# Patient Record
Sex: FEMALE | Race: BLACK OR AFRICAN AMERICAN | NOT HISPANIC OR LATINO | URBAN - METROPOLITAN AREA
[De-identification: names, ages, dates, MRNs, and addresses within clinical notes are randomized per-mention and may not be internally consistent; named-entity substitution may affect disease eponyms.]

---

## 2018-06-30 ENCOUNTER — EMERGENCY (EMERGENCY)
Facility: HOSPITAL | Age: 83
LOS: 1 days | Discharge: HOME | End: 2018-06-30
Attending: EMERGENCY MEDICINE | Admitting: EMERGENCY MEDICINE
Payer: MEDICARE

## 2018-06-30 VITALS
WEIGHT: 153 LBS | HEIGHT: 65 IN | HEART RATE: 92 BPM | OXYGEN SATURATION: 99 % | DIASTOLIC BLOOD PRESSURE: 63 MMHG | SYSTOLIC BLOOD PRESSURE: 115 MMHG | TEMPERATURE: 98 F | RESPIRATION RATE: 20 BRPM

## 2018-06-30 VITALS
HEART RATE: 76 BPM | TEMPERATURE: 98 F | RESPIRATION RATE: 16 BRPM | SYSTOLIC BLOOD PRESSURE: 178 MMHG | DIASTOLIC BLOOD PRESSURE: 88 MMHG | OXYGEN SATURATION: 98 %

## 2018-06-30 DIAGNOSIS — I25.10 ATHEROSCLEROTIC HEART DISEASE OF NATIVE CORONARY ARTERY WITHOUT ANGINA PECTORIS: ICD-10-CM

## 2018-06-30 DIAGNOSIS — Z95.0 PRESENCE OF CARDIAC PACEMAKER: Chronic | ICD-10-CM

## 2018-06-30 DIAGNOSIS — I10 ESSENTIAL (PRIMARY) HYPERTENSION: ICD-10-CM

## 2018-06-30 DIAGNOSIS — Z91.048 OTHER NONMEDICINAL SUBSTANCE ALLERGY STATUS: ICD-10-CM

## 2018-06-30 DIAGNOSIS — J10.1 INFLUENZA DUE TO OTHER IDENTIFIED INFLUENZA VIRUS WITH OTHER RESPIRATORY MANIFESTATIONS: ICD-10-CM

## 2018-06-30 DIAGNOSIS — R42 DIZZINESS AND GIDDINESS: ICD-10-CM

## 2018-06-30 LAB
ALBUMIN SERPL ELPH-MCNC: 4.1 G/DL — SIGNIFICANT CHANGE UP (ref 3.3–5)
ALP SERPL-CCNC: 74 U/L — SIGNIFICANT CHANGE UP (ref 40–120)
ALT FLD-CCNC: 9 U/L — LOW (ref 10–45)
ANION GAP SERPL CALC-SCNC: 9 MMOL/L — SIGNIFICANT CHANGE UP (ref 5–17)
AST SERPL-CCNC: 16 U/L — SIGNIFICANT CHANGE UP (ref 10–40)
BASOPHILS NFR BLD AUTO: 0.4 % — SIGNIFICANT CHANGE UP (ref 0–2)
BILIRUB SERPL-MCNC: 0.2 MG/DL — SIGNIFICANT CHANGE UP (ref 0.2–1.2)
BUN SERPL-MCNC: 14 MG/DL — SIGNIFICANT CHANGE UP (ref 7–23)
CALCIUM SERPL-MCNC: 10 MG/DL — SIGNIFICANT CHANGE UP (ref 8.4–10.5)
CHLORIDE SERPL-SCNC: 102 MMOL/L — SIGNIFICANT CHANGE UP (ref 96–108)
CO2 SERPL-SCNC: 30 MMOL/L — SIGNIFICANT CHANGE UP (ref 22–31)
CREAT SERPL-MCNC: 0.96 MG/DL — SIGNIFICANT CHANGE UP (ref 0.5–1.3)
EOSINOPHIL NFR BLD AUTO: 2 % — SIGNIFICANT CHANGE UP (ref 0–6)
GLUCOSE SERPL-MCNC: 114 MG/DL — HIGH (ref 70–99)
HCT VFR BLD CALC: 34.2 % — LOW (ref 34.5–45)
HGB BLD-MCNC: 11.8 G/DL — SIGNIFICANT CHANGE UP (ref 11.5–15.5)
HPIV3 RNA SPEC QL NAA+PROBE: DETECTED
LYMPHOCYTES # BLD AUTO: 36.3 % — SIGNIFICANT CHANGE UP (ref 13–44)
MAGNESIUM SERPL-MCNC: 2.4 MG/DL — SIGNIFICANT CHANGE UP (ref 1.6–2.6)
MCHC RBC-ENTMCNC: 24.7 PG — LOW (ref 27–34)
MCHC RBC-ENTMCNC: 34.5 G/DL — SIGNIFICANT CHANGE UP (ref 32–36)
MCV RBC AUTO: 71.5 FL — LOW (ref 80–100)
MONOCYTES NFR BLD AUTO: 4.6 % — SIGNIFICANT CHANGE UP (ref 2–14)
NEUTROPHILS NFR BLD AUTO: 56.7 % — SIGNIFICANT CHANGE UP (ref 43–77)
PLATELET # BLD AUTO: 202 K/UL — SIGNIFICANT CHANGE UP (ref 150–400)
POTASSIUM SERPL-MCNC: 4.4 MMOL/L — SIGNIFICANT CHANGE UP (ref 3.5–5.3)
POTASSIUM SERPL-SCNC: 4.4 MMOL/L — SIGNIFICANT CHANGE UP (ref 3.5–5.3)
PROT SERPL-MCNC: 7.7 G/DL — SIGNIFICANT CHANGE UP (ref 6–8.3)
RAPID RVP RESULT: DETECTED
RBC # BLD: 4.78 M/UL — SIGNIFICANT CHANGE UP (ref 3.8–5.2)
RBC # FLD: 16.2 % — SIGNIFICANT CHANGE UP (ref 10.3–16.9)
SODIUM SERPL-SCNC: 141 MMOL/L — SIGNIFICANT CHANGE UP (ref 135–145)
TROPONIN T SERPL-MCNC: <0.01 NG/ML — SIGNIFICANT CHANGE UP (ref 0–0.01)
WBC # BLD: 5 K/UL — SIGNIFICANT CHANGE UP (ref 3.8–10.5)
WBC # FLD AUTO: 5 K/UL — SIGNIFICANT CHANGE UP (ref 3.8–10.5)

## 2018-06-30 PROCEDURE — 99283 EMERGENCY DEPT VISIT LOW MDM: CPT | Mod: 25

## 2018-06-30 PROCEDURE — 93010 ELECTROCARDIOGRAM REPORT: CPT

## 2018-06-30 PROCEDURE — 85025 COMPLETE CBC W/AUTO DIFF WBC: CPT

## 2018-06-30 PROCEDURE — 87486 CHLMYD PNEUM DNA AMP PROBE: CPT

## 2018-06-30 PROCEDURE — 84484 ASSAY OF TROPONIN QUANT: CPT

## 2018-06-30 PROCEDURE — 71047 X-RAY EXAM CHEST 3 VIEWS: CPT | Mod: 26

## 2018-06-30 PROCEDURE — 71047 X-RAY EXAM CHEST 3 VIEWS: CPT

## 2018-06-30 PROCEDURE — 80053 COMPREHEN METABOLIC PANEL: CPT

## 2018-06-30 PROCEDURE — 93005 ELECTROCARDIOGRAM TRACING: CPT

## 2018-06-30 PROCEDURE — 87581 M.PNEUMON DNA AMP PROBE: CPT

## 2018-06-30 PROCEDURE — 36415 COLL VENOUS BLD VENIPUNCTURE: CPT

## 2018-06-30 PROCEDURE — 87633 RESP VIRUS 12-25 TARGETS: CPT

## 2018-06-30 PROCEDURE — 87798 DETECT AGENT NOS DNA AMP: CPT

## 2018-06-30 PROCEDURE — 99285 EMERGENCY DEPT VISIT HI MDM: CPT | Mod: 25

## 2018-06-30 PROCEDURE — 83735 ASSAY OF MAGNESIUM: CPT

## 2018-06-30 RX ADMIN — Medication 100 MILLIGRAM(S): at 14:38

## 2018-06-30 NOTE — ED PROVIDER NOTE - MEDICAL DECISION MAKING DETAILS
cough for almost 2 weeks.  on levaquin day 8/10 but still lingering.  cxr without infiltrate.  labs wnl except for rvp + for parainfluenza.  plan supportive/symptomatic treatment for viral illness.  well appearing

## 2018-06-30 NOTE — ED ADULT TRIAGE NOTE - CHIEF COMPLAINT QUOTE
Patient c/o dizziness  ( lightheadedness )  and palpitations since yesterday , also with bilateral leg numbness for a while  and cough for 1 week. Denies any fever nor chills .

## 2018-06-30 NOTE — ED PROVIDER NOTE - MUSCULOSKELETAL, MLM
Spine appears normal, range of motion is not limited, no muscle or joint tenderness.  legs normal appearance.  trace edema.  pedal pulses 2+.  no redness/warmth

## 2018-06-30 NOTE — ED PROVIDER NOTE - PMH
Atherosclerosis of coronary artery  CAD (coronary artery disease)  Essential hypertension  Hypertension  Gastroesophageal reflux disease  GERD (gastroesophageal reflux disease)  Pulmonary embolism with infarction  Pulmonary embolism

## 2018-06-30 NOTE — ED PROVIDER NOTE - CONSTITUTIONAL, MLM
normal... elderly appearing, well nourished, awake, alert, oriented to person, place, time/situation and in no apparent distress.

## 2018-08-05 ENCOUNTER — EMERGENCY (EMERGENCY)
Facility: HOSPITAL | Age: 83
LOS: 1 days | Discharge: HOME | End: 2018-08-05
Attending: EMERGENCY MEDICINE | Admitting: EMERGENCY MEDICINE
Payer: MEDICARE

## 2018-08-05 VITALS
DIASTOLIC BLOOD PRESSURE: 93 MMHG | HEART RATE: 88 BPM | TEMPERATURE: 99 F | OXYGEN SATURATION: 97 % | SYSTOLIC BLOOD PRESSURE: 160 MMHG | RESPIRATION RATE: 18 BRPM | WEIGHT: 149.91 LBS

## 2018-08-05 DIAGNOSIS — Z95.0 PRESENCE OF CARDIAC PACEMAKER: Chronic | ICD-10-CM

## 2018-08-05 PROCEDURE — 99283 EMERGENCY DEPT VISIT LOW MDM: CPT

## 2018-08-05 NOTE — ED ADULT NURSE NOTE - OBJECTIVE STATEMENT
Pt states "I have rectal pain for a while now because of my hemorrhoids but tonight its worse. It keeps coming and going and when it comes it hurts all the way in my legs. I have an appointment on 8/27/18 to follow up but tonight it's bad". Denies n/v/d, bleeding. Reports constipation

## 2018-08-05 NOTE — ED ADULT TRIAGE NOTE - CHIEF COMPLAINT QUOTE
rectal pain / back pain on/off for days getting worse since last night. pt reports last bowel movement 2 night ago. no rectal bleeding noted. pt denies fever.

## 2018-08-05 NOTE — ED ADULT NURSE NOTE - PMH
Atherosclerosis of coronary artery  CAD (coronary artery disease)  Essential hypertension  Hypertension  Gastroesophageal reflux disease  GERD (gastroesophageal reflux disease)  Hemorrhoid    Pulmonary embolism with infarction  Pulmonary embolism Atherosclerosis of coronary artery  CAD (coronary artery disease)  Essential hypertension  Hypertension  Gastroesophageal reflux disease  GERD (gastroesophageal reflux disease)  Hemorrhoid    Pacemaker    Pulmonary embolism with infarction  Pulmonary embolism

## 2018-08-05 NOTE — ED ADULT NURSE NOTE - CHPI ED NUR SYMPTOMS NEG
no burning urination/no blood in stool/no chills/no fever/no nausea/no abdominal distension/no vomiting/no dysuria/no hematuria/no diarrhea

## 2018-08-05 NOTE — ED ADULT NURSE NOTE - NSIMPLEMENTINTERV_GEN_ALL_ED
Implemented All Fall Risk Interventions:  [place] to call system. Call bell, personal items and telephone within reach. Instruct patient to call for assistance. Room bathroom lighting operational. Non-slip footwear when patient is off stretcher. Physically safe environment: no spills, clutter or unnecessary equipment. Stretcher in lowest position, wheels locked, appropriate side rails in place. Provide visual cue, wrist band, yellow gown, etc. Monitor gait and stability. Monitor for mental status changes and reorient to person, place, and time. Review medications for side effects contributing to fall risk. Reinforce activity limits and safety measures with patient and family.

## 2018-08-06 PROCEDURE — 99283 EMERGENCY DEPT VISIT LOW MDM: CPT

## 2018-08-06 RX ORDER — TRAMADOL HYDROCHLORIDE 50 MG/1
0.5 TABLET ORAL
Qty: 10 | Refills: 0 | OUTPATIENT
Start: 2018-08-06 | End: 2018-08-15

## 2018-08-06 RX ORDER — ACETAMINOPHEN 500 MG
500 TABLET ORAL ONCE
Qty: 0 | Refills: 0 | Status: COMPLETED | OUTPATIENT
Start: 2018-08-06 | End: 2018-08-06

## 2018-08-06 RX ORDER — TRAMADOL HYDROCHLORIDE 50 MG/1
25 TABLET ORAL ONCE
Qty: 0 | Refills: 0 | Status: DISCONTINUED | OUTPATIENT
Start: 2018-08-06 | End: 2018-08-06

## 2018-08-06 RX ORDER — DOCUSATE SODIUM 100 MG
1 CAPSULE ORAL
Qty: 30 | Refills: 0 | OUTPATIENT
Start: 2018-08-06 | End: 2018-08-20

## 2018-08-06 RX ADMIN — TRAMADOL HYDROCHLORIDE 25 MILLIGRAM(S): 50 TABLET ORAL at 00:19

## 2018-08-06 RX ADMIN — Medication 500 MILLIGRAM(S): at 00:19

## 2018-08-06 NOTE — ED PROVIDER NOTE - MEDICAL DECISION MAKING DETAILS
rectal pain- chronic- worse over the past few days, jillian po normally no f/c no blood in urine  no hemorrhoids on exam- low dose tramadol as needed with food-- call to [facility] for colorectal FU  2-3 days rectal pain- chronic- worse over the past few days, spasmodic- jillian po normally no f/c no blood in urine  no hemorrhoids on exam- low dose tramadol as needed with food-- call to [facility] for colorectal FU  2-3 days

## 2018-08-06 NOTE — ED PROVIDER NOTE - PMH
Atherosclerosis of coronary artery  CAD (coronary artery disease)  Essential hypertension  Hypertension  Gastroesophageal reflux disease  GERD (gastroesophageal reflux disease)  Hemorrhoid    Pacemaker    Pulmonary embolism with infarction  Pulmonary embolism

## 2018-08-06 NOTE — ED PROVIDER NOTE - OBJECTIVE STATEMENT
89 F co rectal pain- hx of cad, htn, pacer, pe- co spasmodic rectal pain- comes and goes- when it comes at night it is very severe, sharp- makes her very nervous, a little sweaty- is referred to hemorrhoid specialist- has not seen, has not taken any meds for pain  when pain comes on, it is severe, and feels like it is very tight and she has to push to pass her stool- she is passing stool and gas today no abd pain  no n/v jillian po normally  no exac sx  allev- improves with suppository

## 2018-08-09 DIAGNOSIS — Z79.899 OTHER LONG TERM (CURRENT) DRUG THERAPY: ICD-10-CM

## 2018-08-09 DIAGNOSIS — K62.89 OTHER SPECIFIED DISEASES OF ANUS AND RECTUM: ICD-10-CM

## 2018-08-09 DIAGNOSIS — Z91.041 RADIOGRAPHIC DYE ALLERGY STATUS: ICD-10-CM

## 2018-08-09 DIAGNOSIS — I10 ESSENTIAL (PRIMARY) HYPERTENSION: ICD-10-CM

## 2018-08-09 DIAGNOSIS — I25.10 ATHEROSCLEROTIC HEART DISEASE OF NATIVE CORONARY ARTERY WITHOUT ANGINA PECTORIS: ICD-10-CM

## 2018-08-09 DIAGNOSIS — Z95.0 PRESENCE OF CARDIAC PACEMAKER: ICD-10-CM

## 2018-09-24 PROBLEM — Z95.0 PRESENCE OF CARDIAC PACEMAKER: Chronic | Status: ACTIVE | Noted: 2018-08-05

## 2018-09-24 PROBLEM — K64.9 UNSPECIFIED HEMORRHOIDS: Chronic | Status: ACTIVE | Noted: 2018-08-05

## 2018-09-25 ENCOUNTER — APPOINTMENT (OUTPATIENT)
Dept: OPHTHALMOLOGY | Facility: CLINIC | Age: 83
End: 2018-09-25
Payer: MEDICARE

## 2018-09-25 DIAGNOSIS — Z96.1 PRESENCE OF INTRAOCULAR LENS: ICD-10-CM

## 2018-09-25 DIAGNOSIS — H35.363 DRUSEN (DEGENERATIVE) OF MACULA, BILATERAL: ICD-10-CM

## 2018-09-25 PROCEDURE — 92014 COMPRE OPH EXAM EST PT 1/>: CPT

## 2018-10-09 ENCOUNTER — EMERGENCY (EMERGENCY)
Facility: HOSPITAL | Age: 83
LOS: 1 days | Discharge: HOME | End: 2018-10-09
Attending: EMERGENCY MEDICINE | Admitting: EMERGENCY MEDICINE
Payer: MEDICARE

## 2018-10-09 VITALS
RESPIRATION RATE: 18 BRPM | SYSTOLIC BLOOD PRESSURE: 125 MMHG | TEMPERATURE: 97 F | OXYGEN SATURATION: 100 % | DIASTOLIC BLOOD PRESSURE: 60 MMHG | HEART RATE: 86 BPM

## 2018-10-09 VITALS
TEMPERATURE: 99 F | SYSTOLIC BLOOD PRESSURE: 159 MMHG | HEART RATE: 80 BPM | WEIGHT: 149.91 LBS | RESPIRATION RATE: 16 BRPM | OXYGEN SATURATION: 99 % | DIASTOLIC BLOOD PRESSURE: 85 MMHG

## 2018-10-09 DIAGNOSIS — I25.10 ATHEROSCLEROTIC HEART DISEASE OF NATIVE CORONARY ARTERY WITHOUT ANGINA PECTORIS: ICD-10-CM

## 2018-10-09 DIAGNOSIS — R10.13 EPIGASTRIC PAIN: ICD-10-CM

## 2018-10-09 DIAGNOSIS — Z79.899 OTHER LONG TERM (CURRENT) DRUG THERAPY: ICD-10-CM

## 2018-10-09 DIAGNOSIS — Z95.0 PRESENCE OF CARDIAC PACEMAKER: Chronic | ICD-10-CM

## 2018-10-09 DIAGNOSIS — Z91.041 RADIOGRAPHIC DYE ALLERGY STATUS: ICD-10-CM

## 2018-10-09 DIAGNOSIS — R19.7 DIARRHEA, UNSPECIFIED: ICD-10-CM

## 2018-10-09 DIAGNOSIS — Z79.2 LONG TERM (CURRENT) USE OF ANTIBIOTICS: ICD-10-CM

## 2018-10-09 DIAGNOSIS — I10 ESSENTIAL (PRIMARY) HYPERTENSION: ICD-10-CM

## 2018-10-09 DIAGNOSIS — Z79.891 LONG TERM (CURRENT) USE OF OPIATE ANALGESIC: ICD-10-CM

## 2018-10-09 LAB
ALBUMIN SERPL ELPH-MCNC: 4.1 G/DL — SIGNIFICANT CHANGE UP (ref 3.3–5)
ALP SERPL-CCNC: 76 U/L — SIGNIFICANT CHANGE UP (ref 40–120)
ALT FLD-CCNC: 7 U/L — LOW (ref 10–45)
ANION GAP SERPL CALC-SCNC: 10 MMOL/L — SIGNIFICANT CHANGE UP (ref 5–17)
APPEARANCE UR: CLEAR — SIGNIFICANT CHANGE UP
APTT BLD: 30.6 SEC — SIGNIFICANT CHANGE UP (ref 27.5–37.4)
AST SERPL-CCNC: 14 U/L — SIGNIFICANT CHANGE UP (ref 10–40)
BASOPHILS NFR BLD AUTO: 0.5 % — SIGNIFICANT CHANGE UP (ref 0–2)
BILIRUB SERPL-MCNC: 0.2 MG/DL — SIGNIFICANT CHANGE UP (ref 0.2–1.2)
BILIRUB UR-MCNC: NEGATIVE — SIGNIFICANT CHANGE UP
BUN SERPL-MCNC: 9 MG/DL — SIGNIFICANT CHANGE UP (ref 7–23)
CALCIUM SERPL-MCNC: 9.9 MG/DL — SIGNIFICANT CHANGE UP (ref 8.4–10.5)
CHLORIDE SERPL-SCNC: 98 MMOL/L — SIGNIFICANT CHANGE UP (ref 96–108)
CO2 SERPL-SCNC: 31 MMOL/L — SIGNIFICANT CHANGE UP (ref 22–31)
COLOR SPEC: YELLOW — SIGNIFICANT CHANGE UP
CREAT SERPL-MCNC: 0.78 MG/DL — SIGNIFICANT CHANGE UP (ref 0.5–1.3)
DIFF PNL FLD: NEGATIVE — SIGNIFICANT CHANGE UP
EOSINOPHIL NFR BLD AUTO: 1.6 % — SIGNIFICANT CHANGE UP (ref 0–6)
GLUCOSE SERPL-MCNC: 96 MG/DL — SIGNIFICANT CHANGE UP (ref 70–99)
GLUCOSE UR QL: NEGATIVE — SIGNIFICANT CHANGE UP
HCT VFR BLD CALC: 32.7 % — LOW (ref 34.5–45)
HGB BLD-MCNC: 11.1 G/DL — LOW (ref 11.5–15.5)
INR BLD: 1.03 — SIGNIFICANT CHANGE UP (ref 0.88–1.16)
KETONES UR-MCNC: NEGATIVE — SIGNIFICANT CHANGE UP
LACTATE SERPL-SCNC: 1.9 MMOL/L — SIGNIFICANT CHANGE UP (ref 0.5–2)
LEUKOCYTE ESTERASE UR-ACNC: NEGATIVE — SIGNIFICANT CHANGE UP
LIDOCAIN IGE QN: 29 U/L — SIGNIFICANT CHANGE UP (ref 7–60)
LYMPHOCYTES # BLD AUTO: 30.9 % — SIGNIFICANT CHANGE UP (ref 13–44)
MCHC RBC-ENTMCNC: 24.4 PG — LOW (ref 27–34)
MCHC RBC-ENTMCNC: 33.9 G/DL — SIGNIFICANT CHANGE UP (ref 32–36)
MCV RBC AUTO: 72 FL — LOW (ref 80–100)
MONOCYTES NFR BLD AUTO: 7.7 % — SIGNIFICANT CHANGE UP (ref 2–14)
NEUTROPHILS NFR BLD AUTO: 59.3 % — SIGNIFICANT CHANGE UP (ref 43–77)
NITRITE UR-MCNC: NEGATIVE — SIGNIFICANT CHANGE UP
PH UR: 6.5 — SIGNIFICANT CHANGE UP (ref 5–8)
PLATELET # BLD AUTO: 173 K/UL — SIGNIFICANT CHANGE UP (ref 150–400)
POTASSIUM SERPL-MCNC: 4.2 MMOL/L — SIGNIFICANT CHANGE UP (ref 3.5–5.3)
POTASSIUM SERPL-SCNC: 4.2 MMOL/L — SIGNIFICANT CHANGE UP (ref 3.5–5.3)
PROT SERPL-MCNC: 7.6 G/DL — SIGNIFICANT CHANGE UP (ref 6–8.3)
PROT UR-MCNC: NEGATIVE MG/DL — SIGNIFICANT CHANGE UP
PROTHROM AB SERPL-ACNC: 11.4 SEC — SIGNIFICANT CHANGE UP (ref 9.8–12.7)
RBC # BLD: 4.54 M/UL — SIGNIFICANT CHANGE UP (ref 3.8–5.2)
RBC # FLD: 16.6 % — SIGNIFICANT CHANGE UP (ref 10.3–16.9)
SODIUM SERPL-SCNC: 139 MMOL/L — SIGNIFICANT CHANGE UP (ref 135–145)
SP GR SPEC: <=1.005 — SIGNIFICANT CHANGE UP (ref 1–1.03)
TROPONIN T SERPL-MCNC: <0.01 NG/ML — SIGNIFICANT CHANGE UP (ref 0–0.01)
UROBILINOGEN FLD QL: 0.2 E.U./DL — SIGNIFICANT CHANGE UP
WBC # BLD: 4.4 K/UL — SIGNIFICANT CHANGE UP (ref 3.8–10.5)
WBC # FLD AUTO: 4.4 K/UL — SIGNIFICANT CHANGE UP (ref 3.8–10.5)

## 2018-10-09 PROCEDURE — 96375 TX/PRO/DX INJ NEW DRUG ADDON: CPT | Mod: XU

## 2018-10-09 PROCEDURE — 96365 THER/PROPH/DIAG IV INF INIT: CPT | Mod: XU

## 2018-10-09 PROCEDURE — 71275 CT ANGIOGRAPHY CHEST: CPT

## 2018-10-09 PROCEDURE — 74174 CTA ABD&PLVS W/CONTRAST: CPT

## 2018-10-09 PROCEDURE — 71045 X-RAY EXAM CHEST 1 VIEW: CPT

## 2018-10-09 PROCEDURE — 93010 ELECTROCARDIOGRAM REPORT: CPT

## 2018-10-09 PROCEDURE — 81003 URINALYSIS AUTO W/O SCOPE: CPT

## 2018-10-09 PROCEDURE — 80053 COMPREHEN METABOLIC PANEL: CPT

## 2018-10-09 PROCEDURE — 83690 ASSAY OF LIPASE: CPT

## 2018-10-09 PROCEDURE — 83605 ASSAY OF LACTIC ACID: CPT

## 2018-10-09 PROCEDURE — 71275 CT ANGIOGRAPHY CHEST: CPT | Mod: 26

## 2018-10-09 PROCEDURE — 84484 ASSAY OF TROPONIN QUANT: CPT

## 2018-10-09 PROCEDURE — 96376 TX/PRO/DX INJ SAME DRUG ADON: CPT | Mod: XU

## 2018-10-09 PROCEDURE — 99285 EMERGENCY DEPT VISIT HI MDM: CPT | Mod: 25

## 2018-10-09 PROCEDURE — 82962 GLUCOSE BLOOD TEST: CPT

## 2018-10-09 PROCEDURE — 96361 HYDRATE IV INFUSION ADD-ON: CPT

## 2018-10-09 PROCEDURE — 99284 EMERGENCY DEPT VISIT MOD MDM: CPT | Mod: 25

## 2018-10-09 PROCEDURE — 93005 ELECTROCARDIOGRAM TRACING: CPT

## 2018-10-09 PROCEDURE — 85610 PROTHROMBIN TIME: CPT

## 2018-10-09 PROCEDURE — 74174 CTA ABD&PLVS W/CONTRAST: CPT | Mod: 26

## 2018-10-09 PROCEDURE — 96372 THER/PROPH/DIAG INJ SC/IM: CPT | Mod: XU

## 2018-10-09 PROCEDURE — 85730 THROMBOPLASTIN TIME PARTIAL: CPT

## 2018-10-09 PROCEDURE — 71045 X-RAY EXAM CHEST 1 VIEW: CPT | Mod: 26

## 2018-10-09 PROCEDURE — 85025 COMPLETE CBC W/AUTO DIFF WBC: CPT

## 2018-10-09 RX ORDER — LIDOCAINE 4 G/100G
10 CREAM TOPICAL ONCE
Qty: 0 | Refills: 0 | Status: COMPLETED | OUTPATIENT
Start: 2018-10-09 | End: 2018-10-09

## 2018-10-09 RX ORDER — FAMOTIDINE 10 MG/ML
20 INJECTION INTRAVENOUS ONCE
Qty: 0 | Refills: 0 | Status: COMPLETED | OUTPATIENT
Start: 2018-10-09 | End: 2018-10-09

## 2018-10-09 RX ORDER — ONDANSETRON 8 MG/1
4 TABLET, FILM COATED ORAL ONCE
Qty: 0 | Refills: 0 | Status: COMPLETED | OUTPATIENT
Start: 2018-10-09 | End: 2018-10-09

## 2018-10-09 RX ORDER — ACETAMINOPHEN 500 MG
1000 TABLET ORAL ONCE
Qty: 0 | Refills: 0 | Status: COMPLETED | OUTPATIENT
Start: 2018-10-09 | End: 2018-10-09

## 2018-10-09 RX ORDER — DIPHENHYDRAMINE HCL 50 MG
50 CAPSULE ORAL ONCE
Qty: 0 | Refills: 0 | Status: COMPLETED | OUTPATIENT
Start: 2018-10-09 | End: 2018-10-09

## 2018-10-09 RX ORDER — IOHEXOL 300 MG/ML
30 INJECTION, SOLUTION INTRAVENOUS ONCE
Qty: 0 | Refills: 0 | Status: DISCONTINUED | OUTPATIENT
Start: 2018-10-09 | End: 2018-10-09

## 2018-10-09 RX ORDER — SODIUM CHLORIDE 9 MG/ML
500 INJECTION INTRAMUSCULAR; INTRAVENOUS; SUBCUTANEOUS ONCE
Qty: 0 | Refills: 0 | Status: COMPLETED | OUTPATIENT
Start: 2018-10-09 | End: 2018-10-09

## 2018-10-09 RX ORDER — FAMOTIDINE 10 MG/ML
1 INJECTION INTRAVENOUS
Qty: 30 | Refills: 0
Start: 2018-10-09 | End: 2018-10-23

## 2018-10-09 RX ORDER — EPINEPHRINE 0.3 MG/.3ML
0.3 INJECTION INTRAMUSCULAR; SUBCUTANEOUS ONCE
Qty: 0 | Refills: 0 | Status: COMPLETED | OUTPATIENT
Start: 2018-10-09 | End: 2018-10-09

## 2018-10-09 RX ADMIN — FAMOTIDINE 20 MILLIGRAM(S): 10 INJECTION INTRAVENOUS at 09:11

## 2018-10-09 RX ADMIN — Medication 1000 MILLIGRAM(S): at 12:44

## 2018-10-09 RX ADMIN — ONDANSETRON 4 MILLIGRAM(S): 8 TABLET, FILM COATED ORAL at 14:29

## 2018-10-09 RX ADMIN — Medication 50 MILLIGRAM(S): at 12:14

## 2018-10-09 RX ADMIN — LIDOCAINE 10 MILLILITER(S): 4 CREAM TOPICAL at 15:43

## 2018-10-09 RX ADMIN — Medication 1000 MILLIGRAM(S): at 13:13

## 2018-10-09 RX ADMIN — Medication 30 MILLILITER(S): at 15:43

## 2018-10-09 RX ADMIN — SODIUM CHLORIDE 500 MILLILITER(S): 9 INJECTION INTRAMUSCULAR; INTRAVENOUS; SUBCUTANEOUS at 07:55

## 2018-10-09 RX ADMIN — FAMOTIDINE 20 MILLIGRAM(S): 10 INJECTION INTRAVENOUS at 14:00

## 2018-10-09 RX ADMIN — Medication 400 MILLIGRAM(S): at 12:14

## 2018-10-09 RX ADMIN — SODIUM CHLORIDE 500 MILLILITER(S): 9 INJECTION INTRAMUSCULAR; INTRAVENOUS; SUBCUTANEOUS at 09:11

## 2018-10-09 RX ADMIN — Medication 40 MILLIGRAM(S): at 13:15

## 2018-10-09 RX ADMIN — Medication 40 MILLIGRAM(S): at 09:11

## 2018-10-09 RX ADMIN — EPINEPHRINE 0.3 MILLIGRAM(S): 0.3 INJECTION INTRAMUSCULAR; SUBCUTANEOUS at 14:00

## 2018-10-09 NOTE — ED PROVIDER NOTE - PROGRESS NOTE DETAILS
shortly after CT, pt had episode of decreased responsiveness, BP dropped to 100/60, neuro exam was nonfocal.  It was unlikely anaphylaxis given lack of concomitant allergic sx, although pt given epinephrine.  Pt observed for improvement and noted to regain full level of consciousness, is now alert and oriented x 3, feeling much better and tolerating PO.  ?vasovagal event following CT.  CT result is negative for acute findings, pt stable for d/c to f/u with her pmd.

## 2018-10-09 NOTE — ED PROVIDER NOTE - OBJECTIVE STATEMENT
89 y/o f hx HTN, GERD presents c/o 3 days of epigastric pain, with intermittent lower abd pain.  Pt stating she also has been having loose stools.  Pt concerned reporting pain not exactly like her GERD.  Denies fever, dysuria, CP, SOB, n/v, all other ROS negative.

## 2018-10-09 NOTE — ED ADULT NURSE REASSESSMENT NOTE - NS ED NURSE REASSESS COMMENT FT1
documentation done after pt care. pt c/o feeling nauseous and "not well" after ct scan, pt responsive to painful stimuli only, opening eyes and moaning, MD Champion and PAWAN Nunez made aware, pt taken to resus room, Vs repeated and kept on cardiac monitor, Medications given as per ordered. Pt awaken and answering questions after, epi, and pedcid, 1 NS L fluids given. Will continue to monitor.

## 2018-10-09 NOTE — ED ADULT TRIAGE NOTE - CHIEF COMPLAINT QUOTE
pt complaining of epigastric pain and lower abd pain x 2 weeks denies N/V fever chills admits to diarrhea x 3 days

## 2018-10-09 NOTE — ED ADULT NURSE NOTE - OBJECTIVE STATEMENT
Patient to the E with complaint of generalized epigastric pain associated with diarrhea, for the last few days patient stated that she is unable to sleep and leeks diarrhea all the time even when she does not want to associated symptoms to include burning and pain on urination , denies fever and chills.

## 2018-10-09 NOTE — ED PROVIDER NOTE - ATTENDING CONTRIBUTION TO CARE
89 yo F hx of GERD and HTN presenting with 2-3 days of burning epigastric pain sometimes in lower abd and back.  Similar but worse than typical gerd.  + associated loose BMs w/o blood and chills but no fever.  No chest pain, n/v, weakness, numbness, tingling.  Pts looks well, belly soft, nl pulses UE and LE, 2/2.  Nl cardio pulm exam.  VS - HTN, otherwise stable.  Plan EKG, CXR, labs, CTa (known severe allergy to contrast) - will need pretx given potential for dissection or vascular occlusive dz.  Most likely however this is GERD/gastritis.

## 2018-10-09 NOTE — ED PROVIDER NOTE - MEDICAL DECISION MAKING DETAILS
89 y/o f hx HTN, GERD presents c/o epigastric pain, intermittent lower abd pain x 3 days with diarrhea; no tenderness on exam in ED, labs wnl, urine normal, vss.  Will CT chest/abd/pelvis to r/o dissection given atypical pain, possible GERD etiology given pepcid.  Will f/u imaging and reassess.  (pt with severe IV contrast allergy, pre-medicated in order to obtain appropriate study).

## 2018-10-09 NOTE — ED ADULT NURSE NOTE - FAMILY HISTORY
No pertinent family history, No significant family history     Problems influencing health status, No family history of coronary artery disease

## 2018-10-11 ENCOUNTER — EMERGENCY (EMERGENCY)
Facility: HOSPITAL | Age: 83
LOS: 1 days | Discharge: HOME | End: 2018-10-11
Attending: EMERGENCY MEDICINE | Admitting: EMERGENCY MEDICINE
Payer: MEDICARE

## 2018-10-11 VITALS
TEMPERATURE: 98 F | HEART RATE: 70 BPM | SYSTOLIC BLOOD PRESSURE: 174 MMHG | RESPIRATION RATE: 20 BRPM | OXYGEN SATURATION: 98 % | DIASTOLIC BLOOD PRESSURE: 79 MMHG

## 2018-10-11 VITALS
TEMPERATURE: 98 F | HEART RATE: 78 BPM | WEIGHT: 149.91 LBS | RESPIRATION RATE: 20 BRPM | OXYGEN SATURATION: 98 % | SYSTOLIC BLOOD PRESSURE: 167 MMHG | DIASTOLIC BLOOD PRESSURE: 85 MMHG

## 2018-10-11 DIAGNOSIS — Z91.048 OTHER NONMEDICINAL SUBSTANCE ALLERGY STATUS: ICD-10-CM

## 2018-10-11 DIAGNOSIS — I25.10 ATHEROSCLEROTIC HEART DISEASE OF NATIVE CORONARY ARTERY WITHOUT ANGINA PECTORIS: ICD-10-CM

## 2018-10-11 DIAGNOSIS — R42 DIZZINESS AND GIDDINESS: ICD-10-CM

## 2018-10-11 DIAGNOSIS — Z95.0 PRESENCE OF CARDIAC PACEMAKER: Chronic | ICD-10-CM

## 2018-10-11 DIAGNOSIS — Z79.899 OTHER LONG TERM (CURRENT) DRUG THERAPY: ICD-10-CM

## 2018-10-11 DIAGNOSIS — I10 ESSENTIAL (PRIMARY) HYPERTENSION: ICD-10-CM

## 2018-10-11 LAB
ALBUMIN SERPL ELPH-MCNC: 4.4 G/DL — SIGNIFICANT CHANGE UP (ref 3.3–5)
ALP SERPL-CCNC: 73 U/L — SIGNIFICANT CHANGE UP (ref 40–120)
ALT FLD-CCNC: 10 U/L — SIGNIFICANT CHANGE UP (ref 10–45)
ANION GAP SERPL CALC-SCNC: 12 MMOL/L — SIGNIFICANT CHANGE UP (ref 5–17)
APPEARANCE UR: CLEAR — SIGNIFICANT CHANGE UP
APTT BLD: 26.7 SEC — LOW (ref 27.5–37.4)
AST SERPL-CCNC: 20 U/L — SIGNIFICANT CHANGE UP (ref 10–40)
BASOPHILS NFR BLD AUTO: 0.4 % — SIGNIFICANT CHANGE UP (ref 0–2)
BILIRUB SERPL-MCNC: 0.2 MG/DL — SIGNIFICANT CHANGE UP (ref 0.2–1.2)
BILIRUB UR-MCNC: NEGATIVE — SIGNIFICANT CHANGE UP
BUN SERPL-MCNC: 12 MG/DL — SIGNIFICANT CHANGE UP (ref 7–23)
CALCIUM SERPL-MCNC: 10.5 MG/DL — SIGNIFICANT CHANGE UP (ref 8.4–10.5)
CHLORIDE SERPL-SCNC: 98 MMOL/L — SIGNIFICANT CHANGE UP (ref 96–108)
CK MB CFR SERPL CALC: 1.5 NG/ML — SIGNIFICANT CHANGE UP (ref 0–6.7)
CO2 SERPL-SCNC: 29 MMOL/L — SIGNIFICANT CHANGE UP (ref 22–31)
COLOR SPEC: YELLOW — SIGNIFICANT CHANGE UP
CREAT SERPL-MCNC: 0.82 MG/DL — SIGNIFICANT CHANGE UP (ref 0.5–1.3)
DIFF PNL FLD: NEGATIVE — SIGNIFICANT CHANGE UP
EOSINOPHIL NFR BLD AUTO: 0.5 % — SIGNIFICANT CHANGE UP (ref 0–6)
GLUCOSE SERPL-MCNC: 103 MG/DL — HIGH (ref 70–99)
GLUCOSE UR QL: NEGATIVE — SIGNIFICANT CHANGE UP
HCT VFR BLD CALC: 32.8 % — LOW (ref 34.5–45)
HGB BLD-MCNC: 11.5 G/DL — SIGNIFICANT CHANGE UP (ref 11.5–15.5)
INR BLD: 1.04 — SIGNIFICANT CHANGE UP (ref 0.88–1.16)
KETONES UR-MCNC: NEGATIVE — SIGNIFICANT CHANGE UP
LEUKOCYTE ESTERASE UR-ACNC: ABNORMAL
LYMPHOCYTES # BLD AUTO: 41.4 % — SIGNIFICANT CHANGE UP (ref 13–44)
MCHC RBC-ENTMCNC: 24.9 PG — LOW (ref 27–34)
MCHC RBC-ENTMCNC: 35.1 G/DL — SIGNIFICANT CHANGE UP (ref 32–36)
MCV RBC AUTO: 71 FL — LOW (ref 80–100)
MONOCYTES NFR BLD AUTO: 5.3 % — SIGNIFICANT CHANGE UP (ref 2–14)
NEUTROPHILS NFR BLD AUTO: 52.4 % — SIGNIFICANT CHANGE UP (ref 43–77)
NITRITE UR-MCNC: NEGATIVE — SIGNIFICANT CHANGE UP
PH UR: 7.5 — SIGNIFICANT CHANGE UP (ref 5–8)
PLATELET # BLD AUTO: 184 K/UL — SIGNIFICANT CHANGE UP (ref 150–400)
POTASSIUM SERPL-MCNC: 4.1 MMOL/L — SIGNIFICANT CHANGE UP (ref 3.5–5.3)
POTASSIUM SERPL-SCNC: 4.1 MMOL/L — SIGNIFICANT CHANGE UP (ref 3.5–5.3)
PROT SERPL-MCNC: 8 G/DL — SIGNIFICANT CHANGE UP (ref 6–8.3)
PROT UR-MCNC: NEGATIVE MG/DL — SIGNIFICANT CHANGE UP
PROTHROM AB SERPL-ACNC: 11.5 SEC — SIGNIFICANT CHANGE UP (ref 9.8–12.7)
RBC # BLD: 4.62 M/UL — SIGNIFICANT CHANGE UP (ref 3.8–5.2)
RBC # FLD: 16.5 % — SIGNIFICANT CHANGE UP (ref 10.3–16.9)
SODIUM SERPL-SCNC: 139 MMOL/L — SIGNIFICANT CHANGE UP (ref 135–145)
SP GR SPEC: 1.01 — SIGNIFICANT CHANGE UP (ref 1–1.03)
TROPONIN T SERPL-MCNC: <0.01 NG/ML — SIGNIFICANT CHANGE UP (ref 0–0.01)
UROBILINOGEN FLD QL: 0.2 E.U./DL — SIGNIFICANT CHANGE UP
WBC # BLD: 5.6 K/UL — SIGNIFICANT CHANGE UP (ref 3.8–10.5)
WBC # FLD AUTO: 5.6 K/UL — SIGNIFICANT CHANGE UP (ref 3.8–10.5)

## 2018-10-11 PROCEDURE — 99284 EMERGENCY DEPT VISIT MOD MDM: CPT | Mod: 25

## 2018-10-11 PROCEDURE — 85730 THROMBOPLASTIN TIME PARTIAL: CPT

## 2018-10-11 PROCEDURE — 99284 EMERGENCY DEPT VISIT MOD MDM: CPT

## 2018-10-11 PROCEDURE — 80053 COMPREHEN METABOLIC PANEL: CPT

## 2018-10-11 PROCEDURE — 85025 COMPLETE CBC W/AUTO DIFF WBC: CPT

## 2018-10-11 PROCEDURE — 84484 ASSAY OF TROPONIN QUANT: CPT

## 2018-10-11 PROCEDURE — 70450 CT HEAD/BRAIN W/O DYE: CPT | Mod: 26

## 2018-10-11 PROCEDURE — 70450 CT HEAD/BRAIN W/O DYE: CPT

## 2018-10-11 PROCEDURE — 85610 PROTHROMBIN TIME: CPT

## 2018-10-11 PROCEDURE — 82962 GLUCOSE BLOOD TEST: CPT

## 2018-10-11 PROCEDURE — 36415 COLL VENOUS BLD VENIPUNCTURE: CPT

## 2018-10-11 PROCEDURE — 82553 CREATINE MB FRACTION: CPT

## 2018-10-11 PROCEDURE — 81001 URINALYSIS AUTO W/SCOPE: CPT

## 2018-10-11 PROCEDURE — 82550 ASSAY OF CK (CPK): CPT

## 2018-10-11 RX ORDER — SODIUM CHLORIDE 9 MG/ML
1000 INJECTION INTRAMUSCULAR; INTRAVENOUS; SUBCUTANEOUS ONCE
Qty: 0 | Refills: 0 | Status: COMPLETED | OUTPATIENT
Start: 2018-10-11 | End: 2018-10-11

## 2018-10-11 RX ORDER — ACETAMINOPHEN 500 MG
650 TABLET ORAL ONCE
Qty: 0 | Refills: 0 | Status: COMPLETED | OUTPATIENT
Start: 2018-10-11 | End: 2018-10-11

## 2018-10-11 RX ADMIN — SODIUM CHLORIDE 1000 MILLILITER(S): 9 INJECTION INTRAMUSCULAR; INTRAVENOUS; SUBCUTANEOUS at 19:38

## 2018-10-11 RX ADMIN — Medication 650 MILLIGRAM(S): at 18:45

## 2018-10-11 RX ADMIN — Medication 650 MILLIGRAM(S): at 17:37

## 2018-10-11 NOTE — ED PROVIDER NOTE - MEDICAL DECISION MAKING DETAILS
89 yo F with pmh of HTN, GERD, PPM c/o lightheadedness x 2 days since receiving IV contrast.  Associated with HA and generalized weakness. No cp, sob, n/v. VSS. Well appearing. Neurologically intact. EKG NSR @ 71, TWI III, AVf, V4-V6. check labs, CT, hydrate

## 2018-10-11 NOTE — ED ADULT NURSE NOTE - NSIMPLEMENTINTERV_GEN_ALL_ED
Implemented All Fall with Harm Risk Interventions:  [place] to call system. Call bell, personal items and telephone within reach. Instruct patient to call for assistance. Room bathroom lighting operational. Non-slip footwear when patient is off stretcher. Physically safe environment: no spills, clutter or unnecessary equipment. Stretcher in lowest position, wheels locked, appropriate side rails in place. Provide visual cue, wrist band, yellow gown, etc. Monitor gait and stability. Monitor for mental status changes and reorient to person, place, and time. Review medications for side effects contributing to fall risk. Reinforce activity limits and safety measures with patient and family. Provide visual clues: red socks.

## 2018-10-11 NOTE — ED PROVIDER NOTE - OBJECTIVE STATEMENT
89 yo F with pmh of HTN, GERD, PPM c/o lightheadedness x 2 days since receiving IV contrast. Pt states she was here 2 days ago and was given IV cotnrast for a CTA C/A/P. Pt states she is allergic to contrast and had to be pre-medicated but she still had an unresponsive after receiving the contrast. Pt states since then she has been feeling lightheaded and weak. Pt also c/o chronic pain in the top of her head an forehead for over 1 year but has been worse over the past 2 days. Pt states she has a feeling of "swelling" in her head. Pt has been evaluated by a neurologist and had negative CT scans. Pt has tried different treatments without relief. Pt reports the feeling in her head is also worse over the past 2 days. Denies fever, chills, n/v, 89 yo F with pmh of HTN, GERD, PPM c/o lightheadedness x 2 days since receiving IV contrast. Pt states she was here 2 days ago and was given IV contrast for a CTA C/A/P. Pt states she is allergic to contrast and had to be pre-medicated but she still had an unresponsive after receiving the contrast. Pt states since then she has been feeling lightheaded and weak. Pt also c/o chronic pain in the top of her head an forehead for over 1 year but has been worse over the past 2 days. Pt states she has a feeling of "swelling" in her head. Pt has been evaluated by a neurologist and had negative CT scans. Pt has tried different treatments without relief. Pt reports the feeling in her head is also worse over the past 2 days. Denies fever, chills, n/v, visual changes, numbness, tingling, focal weakness, CP, sob, palpitations, abd pain.

## 2018-10-11 NOTE — ED PROVIDER NOTE - ATTENDING CONTRIBUTION TO CARE
Attending Statement: I have personally performed a face to face diagnostic evaluation on this patient. I have reviewed the ACP note and agree with the history, exam and plan of care, except as noted.     Attending Contribution to Care:  89F with pmh of HTN, GERD, PPM c/o lightheadedness x 2 days since receiving IV contrast.  Associated with HA and generalized weakness. No cp, sob, n/v. VSS. Well appearing. No focal neuro deficits. EKG no stemi. Labs and CT with no emergent findings. Pt was hydrated. She was very upset about being given IV contrast the other day and I assured her chart reflects that she is allergic and that she will not get it again. She felt better after a period of observation. She was advised to recheck her BP and continue her meds as her BP was slightly high today. She verbalized understanding. The patient is stable for DC. They were advised to call their PMD for prompt outpatient follow up. Return precautions were discussed. The patient was advised to return to the ER for any concerning or worsening symptoms.

## 2018-10-11 NOTE — ED PROVIDER NOTE - PHYSICAL EXAMINATION
CONSTITUTIONAL: Well-appearing; well-nourished; in no apparent distress.   HEAD: Normocephalic; atraumatic.   EYES: PERRL; EOM intact; conjunctiva and sclera clear  ENT: normal nose; no rhinorrhea; normal pharynx with no erythema or lesions.   NECK: Supple; non-tender; no LAD  CARDIOVASCULAR: Normal S1, S2; no murmurs, rubs, or gallops. Regular rate and rhythm.   RESPIRATORY: Breathing easily; breath sounds clear and equal bilaterally; no wheezes, rhonchi, or rales.  GI: Soft; non-distended; non-tender; no palpable organomegaly.   MSK: FROM at all extremities, normal tone   EXT: No cyanosis or edema; N/V intact  SKIN: Normal for age and race; warm; dry; good turgor; no apparent lesions or rash.   NEURO: A & O x 3; face symmetric; grossly unremarkable. CN 2-12 intact, normal finger to nose, normal gait, no pronator drift   PSYCHOLOGICAL: The patient’s mood and manner are appropriate.

## 2018-10-11 NOTE — ED ADULT NURSE NOTE - OBJECTIVE STATEMENT
pt reports dizziness for the past 2 days with walking.  States the dizziness occurs with walking.   Denies pain.  No extremities weakness.  Face symmetrical

## 2018-10-11 NOTE — ED ADULT TRIAGE NOTE - CHIEF COMPLAINT QUOTE
pt c/o dizziness since 2 days ago. pt states " I was here Tuesday and they gave me that contrast and since then I feel dizzy"  FS and ekg in progress.  no facial droop noted.

## 2018-10-11 NOTE — ED ADULT NURSE NOTE - CADM POA PRESS ULCER
FYI: Patient has refused stress testing as recommended by his cardiologist and therefore I cannot comment on perioperative risk.
No

## 2019-01-04 NOTE — ED PROVIDER NOTE - OBJECTIVE STATEMENT
RN phoned patient with results.  Left message for pt to return call to [facility]    Encounter open for patient call back   here with persistent cough for a few weeks.  Started while traveling out of state.  Went to urgent care and was prescribed levaquin 500mg x 10 days.  Has taken 8 days.  States cough less productive but persistent.  Associated with intermittent lightheadedness and numbness in legs.  No fever, chest pain, n/v/d.  Reports seeing pmd on Monday and being told she didn't have pneumonia but that she should finish the Levaquin prescription.

## 2019-01-15 ENCOUNTER — APPOINTMENT (OUTPATIENT)
Dept: OPHTHALMOLOGY | Facility: CLINIC | Age: 84
End: 2019-01-15
Payer: MEDICARE

## 2019-01-15 PROCEDURE — 92012 INTRM OPH EXAM EST PATIENT: CPT

## 2019-01-15 PROCEDURE — 92134 CPTRZ OPH DX IMG PST SGM RTA: CPT

## 2019-02-14 ENCOUNTER — EMERGENCY (EMERGENCY)
Facility: HOSPITAL | Age: 84
LOS: 1 days | Discharge: HOME | End: 2019-02-14
Attending: EMERGENCY MEDICINE | Admitting: EMERGENCY MEDICINE
Payer: MEDICARE

## 2019-02-14 VITALS
OXYGEN SATURATION: 98 % | SYSTOLIC BLOOD PRESSURE: 153 MMHG | WEIGHT: 147.93 LBS | RESPIRATION RATE: 16 BRPM | HEIGHT: 65 IN | DIASTOLIC BLOOD PRESSURE: 78 MMHG | HEART RATE: 72 BPM | TEMPERATURE: 98 F

## 2019-02-14 VITALS
TEMPERATURE: 99 F | SYSTOLIC BLOOD PRESSURE: 138 MMHG | RESPIRATION RATE: 18 BRPM | HEART RATE: 71 BPM | OXYGEN SATURATION: 98 % | DIASTOLIC BLOOD PRESSURE: 65 MMHG

## 2019-02-14 DIAGNOSIS — K62.89 OTHER SPECIFIED DISEASES OF ANUS AND RECTUM: ICD-10-CM

## 2019-02-14 DIAGNOSIS — Z95.0 PRESENCE OF CARDIAC PACEMAKER: Chronic | ICD-10-CM

## 2019-02-14 DIAGNOSIS — I25.10 ATHEROSCLEROTIC HEART DISEASE OF NATIVE CORONARY ARTERY WITHOUT ANGINA PECTORIS: ICD-10-CM

## 2019-02-14 DIAGNOSIS — I10 ESSENTIAL (PRIMARY) HYPERTENSION: ICD-10-CM

## 2019-02-14 DIAGNOSIS — Z79.899 OTHER LONG TERM (CURRENT) DRUG THERAPY: ICD-10-CM

## 2019-02-14 DIAGNOSIS — Z91.041 RADIOGRAPHIC DYE ALLERGY STATUS: ICD-10-CM

## 2019-02-14 DIAGNOSIS — G89.29 OTHER CHRONIC PAIN: ICD-10-CM

## 2019-02-14 DIAGNOSIS — E87.1 HYPO-OSMOLALITY AND HYPONATREMIA: ICD-10-CM

## 2019-02-14 LAB
ALBUMIN SERPL ELPH-MCNC: 4.4 G/DL — SIGNIFICANT CHANGE UP (ref 3.3–5)
ALP SERPL-CCNC: 89 U/L — SIGNIFICANT CHANGE UP (ref 40–120)
ALT FLD-CCNC: 12 U/L — SIGNIFICANT CHANGE UP (ref 10–45)
ANION GAP SERPL CALC-SCNC: 10 MMOL/L — SIGNIFICANT CHANGE UP (ref 5–17)
ANISOCYTOSIS BLD QL: SLIGHT — SIGNIFICANT CHANGE UP
APPEARANCE UR: CLEAR — SIGNIFICANT CHANGE UP
APTT BLD: 28 SEC — SIGNIFICANT CHANGE UP (ref 27.5–36.3)
AST SERPL-CCNC: 21 U/L — SIGNIFICANT CHANGE UP (ref 10–40)
BASOPHILS # BLD AUTO: 0.04 K/UL — SIGNIFICANT CHANGE UP (ref 0–0.2)
BASOPHILS NFR BLD AUTO: 0.7 % — SIGNIFICANT CHANGE UP (ref 0–2)
BILIRUB SERPL-MCNC: 0.3 MG/DL — SIGNIFICANT CHANGE UP (ref 0.2–1.2)
BILIRUB UR-MCNC: NEGATIVE — SIGNIFICANT CHANGE UP
BUN SERPL-MCNC: 10 MG/DL — SIGNIFICANT CHANGE UP (ref 7–23)
CALCIUM SERPL-MCNC: 10.2 MG/DL — SIGNIFICANT CHANGE UP (ref 8.4–10.5)
CHLORIDE SERPL-SCNC: 86 MMOL/L — LOW (ref 96–108)
CO2 SERPL-SCNC: 32 MMOL/L — HIGH (ref 22–31)
COLOR SPEC: YELLOW — SIGNIFICANT CHANGE UP
CREAT SERPL-MCNC: 0.71 MG/DL — SIGNIFICANT CHANGE UP (ref 0.5–1.3)
DIFF PNL FLD: NEGATIVE — SIGNIFICANT CHANGE UP
EOSINOPHIL # BLD AUTO: 0.1 K/UL — SIGNIFICANT CHANGE UP (ref 0–0.5)
EOSINOPHIL NFR BLD AUTO: 1.7 % — SIGNIFICANT CHANGE UP (ref 0–6)
GLUCOSE SERPL-MCNC: 111 MG/DL — HIGH (ref 70–99)
GLUCOSE UR QL: NEGATIVE — SIGNIFICANT CHANGE UP
HCT VFR BLD CALC: 34.8 % — SIGNIFICANT CHANGE UP (ref 34.5–45)
HGB BLD-MCNC: 12.3 G/DL — SIGNIFICANT CHANGE UP (ref 11.5–15.5)
IMM GRANULOCYTES NFR BLD AUTO: 0.2 % — SIGNIFICANT CHANGE UP (ref 0–1.5)
INR BLD: 0.97 — SIGNIFICANT CHANGE UP (ref 0.88–1.16)
KETONES UR-MCNC: NEGATIVE — SIGNIFICANT CHANGE UP
LEUKOCYTE ESTERASE UR-ACNC: ABNORMAL
LYMPHOCYTES # BLD AUTO: 2.48 K/UL — SIGNIFICANT CHANGE UP (ref 1–3.3)
LYMPHOCYTES # BLD AUTO: 41.5 % — SIGNIFICANT CHANGE UP (ref 13–44)
MANUAL SMEAR VERIFICATION: SIGNIFICANT CHANGE UP
MCHC RBC-ENTMCNC: 25.7 PG — LOW (ref 27–34)
MCHC RBC-ENTMCNC: 35.3 GM/DL — SIGNIFICANT CHANGE UP (ref 32–36)
MCV RBC AUTO: 72.7 FL — LOW (ref 80–100)
MICROCYTES BLD QL: SLIGHT — SIGNIFICANT CHANGE UP
MONOCYTES # BLD AUTO: 0.51 K/UL — SIGNIFICANT CHANGE UP (ref 0–0.9)
MONOCYTES NFR BLD AUTO: 8.5 % — SIGNIFICANT CHANGE UP (ref 2–14)
NEUTROPHILS # BLD AUTO: 2.84 K/UL — SIGNIFICANT CHANGE UP (ref 1.8–7.4)
NEUTROPHILS NFR BLD AUTO: 47.4 % — SIGNIFICANT CHANGE UP (ref 43–77)
NITRITE UR-MCNC: NEGATIVE — SIGNIFICANT CHANGE UP
NRBC # BLD: 0 /100 WBCS — SIGNIFICANT CHANGE UP (ref 0–0)
OVALOCYTES BLD QL SMEAR: SLIGHT — SIGNIFICANT CHANGE UP
PH UR: 6 — SIGNIFICANT CHANGE UP (ref 5–8)
PLAT MORPH BLD: ABNORMAL
PLATELET # BLD AUTO: 205 K/UL — SIGNIFICANT CHANGE UP (ref 150–400)
POIKILOCYTOSIS BLD QL AUTO: SLIGHT — SIGNIFICANT CHANGE UP
POTASSIUM SERPL-MCNC: 3.2 MMOL/L — LOW (ref 3.5–5.3)
POTASSIUM SERPL-SCNC: 3.2 MMOL/L — LOW (ref 3.5–5.3)
PROT SERPL-MCNC: 7.8 G/DL — SIGNIFICANT CHANGE UP (ref 6–8.3)
PROT UR-MCNC: NEGATIVE MG/DL — SIGNIFICANT CHANGE UP
PROTHROM AB SERPL-ACNC: 10.9 SEC — SIGNIFICANT CHANGE UP (ref 10–12.9)
RBC # BLD: 4.79 M/UL — SIGNIFICANT CHANGE UP (ref 3.8–5.2)
RBC # FLD: 15.9 % — HIGH (ref 10.3–14.5)
RBC BLD AUTO: ABNORMAL
RBC CASTS # UR COMP ASSIST: < 5 /HPF — SIGNIFICANT CHANGE UP
SCHISTOCYTES BLD QL AUTO: SLIGHT — SIGNIFICANT CHANGE UP
SODIUM SERPL-SCNC: 128 MMOL/L — LOW (ref 135–145)
SP GR SPEC: <=1.005 — SIGNIFICANT CHANGE UP (ref 1–1.03)
STOMATOCYTES BLD QL SMEAR: SLIGHT — SIGNIFICANT CHANGE UP
TARGETS BLD QL SMEAR: SLIGHT — SIGNIFICANT CHANGE UP
UROBILINOGEN FLD QL: 0.2 E.U./DL — SIGNIFICANT CHANGE UP
WBC # BLD: 5.98 K/UL — SIGNIFICANT CHANGE UP (ref 3.8–10.5)
WBC # FLD AUTO: 5.98 K/UL — SIGNIFICANT CHANGE UP (ref 3.8–10.5)
WBC UR QL: < 5 /HPF — SIGNIFICANT CHANGE UP

## 2019-02-14 PROCEDURE — 80053 COMPREHEN METABOLIC PANEL: CPT

## 2019-02-14 PROCEDURE — 74019 RADEX ABDOMEN 2 VIEWS: CPT | Mod: 26

## 2019-02-14 PROCEDURE — 85025 COMPLETE CBC W/AUTO DIFF WBC: CPT

## 2019-02-14 PROCEDURE — 99284 EMERGENCY DEPT VISIT MOD MDM: CPT

## 2019-02-14 PROCEDURE — 36415 COLL VENOUS BLD VENIPUNCTURE: CPT

## 2019-02-14 PROCEDURE — 74176 CT ABD & PELVIS W/O CONTRAST: CPT | Mod: 26

## 2019-02-14 PROCEDURE — 74176 CT ABD & PELVIS W/O CONTRAST: CPT

## 2019-02-14 PROCEDURE — 81001 URINALYSIS AUTO W/SCOPE: CPT

## 2019-02-14 PROCEDURE — 99284 EMERGENCY DEPT VISIT MOD MDM: CPT | Mod: 25

## 2019-02-14 PROCEDURE — 85610 PROTHROMBIN TIME: CPT

## 2019-02-14 PROCEDURE — 85730 THROMBOPLASTIN TIME PARTIAL: CPT

## 2019-02-14 PROCEDURE — 74019 RADEX ABDOMEN 2 VIEWS: CPT

## 2019-02-14 RX ORDER — CYCLOBENZAPRINE HYDROCHLORIDE 10 MG/1
1 TABLET, FILM COATED ORAL
Qty: 10 | Refills: 0 | OUTPATIENT
Start: 2019-02-14 | End: 2019-02-18

## 2019-02-14 RX ORDER — SODIUM CHLORIDE 9 MG/ML
1000 INJECTION INTRAMUSCULAR; INTRAVENOUS; SUBCUTANEOUS
Qty: 0 | Refills: 0 | Status: DISCONTINUED | OUTPATIENT
Start: 2019-02-14 | End: 2019-02-18

## 2019-02-14 RX ORDER — POTASSIUM CHLORIDE 20 MEQ
20 PACKET (EA) ORAL ONCE
Qty: 0 | Refills: 0 | Status: COMPLETED | OUTPATIENT
Start: 2019-02-14 | End: 2019-02-14

## 2019-02-14 RX ORDER — SODIUM CHLORIDE 9 MG/ML
3 INJECTION INTRAMUSCULAR; INTRAVENOUS; SUBCUTANEOUS ONCE
Qty: 0 | Refills: 0 | Status: COMPLETED | OUTPATIENT
Start: 2019-02-14 | End: 2019-02-14

## 2019-02-14 RX ORDER — RADIOPAQUE PVC MARKERS/BARIUM 24MARKERS
450 CAPSULE ORAL ONCE
Qty: 0 | Refills: 0 | Status: COMPLETED | OUTPATIENT
Start: 2019-02-14 | End: 2019-02-14

## 2019-02-14 RX ORDER — CYCLOBENZAPRINE HYDROCHLORIDE 10 MG/1
1 TABLET, FILM COATED ORAL
Qty: 10 | Refills: 0
Start: 2019-02-14 | End: 2019-02-18

## 2019-02-14 RX ADMIN — SODIUM CHLORIDE 125 MILLILITER(S): 9 INJECTION INTRAMUSCULAR; INTRAVENOUS; SUBCUTANEOUS at 20:38

## 2019-02-14 RX ADMIN — Medication 450 MILLILITER(S): at 20:30

## 2019-02-14 RX ADMIN — Medication 20 MILLIEQUIVALENT(S): at 22:12

## 2019-02-14 RX ADMIN — SODIUM CHLORIDE 3 MILLILITER(S): 9 INJECTION INTRAMUSCULAR; INTRAVENOUS; SUBCUTANEOUS at 20:30

## 2019-02-14 NOTE — ED ADULT NURSE NOTE - NSIMPLEMENTINTERV_GEN_ALL_ED
Implemented All Universal Safety Interventions:  [place] to call system. Call bell, personal items and telephone within reach. Instruct patient to call for assistance. Room bathroom lighting operational. Non-slip footwear when patient is off stretcher. Physically safe environment: no spills, clutter or unnecessary equipment. Stretcher in lowest position, wheels locked, appropriate side rails in place.

## 2019-02-14 NOTE — ED PROVIDER NOTE - DIAGNOSTIC INTERPRETATION
ER Physician: Black Velazquez ABD XRAY INTERPRETATION:no free air, nsbg pattern ? air fluid levels RUQ

## 2019-02-14 NOTE — ED ADULT NURSE NOTE - CHPI ED NUR SYMPTOMS NEG
no hematuria/no abdominal distension/no nausea/no blood in stool/no burning urination/no chills/no fever/no dysuria/no diarrhea/no vomiting

## 2019-02-14 NOTE — ED PROVIDER NOTE - OBJECTIVE STATEMENT
89 F w hx of PE, gerd, co rectal pain- hx of chronic int rectal spasms- has been seen here and by specialist- tried rectiv cream without relief  sharp spasming pain last night- when the pain comes up it radiates down her leg and she feels dizzy  tolerating PO normally no n/v no prior abd surgeries- feels like she can't pass gas- last BM was yesterday  took an enema without BM today  no f/c no abd pain  moderate-sever  no gu sx, no rectal pain now

## 2019-02-14 NOTE — ED PROVIDER NOTE - NSFOLLOWUPINSTRUCTIONS_ED_ALL_ED_FT
Add salad to your daily intake- your sodium was low    your CT scans shows constipation- take bisacodyl 1-2 times per day    return immediately for FEVER, CHILLS, ABD pain

## 2019-02-14 NOTE — ED ADULT TRIAGE NOTE - CHIEF COMPLAINT QUOTE
Patient c/o rectal pain like squeezing pain since yesterday ,also been feeling dizzy all the time got worse yesterday . Denies any nausea nor vomiting .

## 2019-02-14 NOTE — ED ADULT NURSE NOTE - OBJECTIVE STATEMENT
PT came to ED complaining of rectal pain since yesterday. pt reports BM yesterday.  PT denies melena, dark stools. PT reports feeling gas and pressure, reports increased flatulence.  Abd is SSNT x4.  PT denies chest pain, SOB, abd pain,  symptoms, fever, chills, D/N/V.  Pt is alert and oriented x3.

## 2019-02-14 NOTE — ED PROVIDER NOTE - CLINICAL SUMMARY MEDICAL DECISION MAKING FREE TEXT BOX
rectal pain- neg rectal exam- small air fluid levels- given age- will check CT abd/pelvis  baseline labs, CT - tolerated contrast - CT 10 pm rectal pain- neg rectal exam- small air fluid levels- given age- will check CT abd/pelvis  baseline labs, CT - tolerated contrast - CT 10 pm  CT- no obstruction- fecalization- will rx w bisacodyl- no abd pain no wbc- doubt early tics- will not rx- fu if pain, f/c, change in bowel habits

## 2019-02-14 NOTE — ED PROVIDER NOTE - CARE PLAN
Principal Discharge DX:	Rectal pain, chronic Principal Discharge DX:	Rectal pain, chronic  Secondary Diagnosis:	Hyponatremia

## 2019-02-21 ENCOUNTER — EMERGENCY (EMERGENCY)
Facility: HOSPITAL | Age: 84
LOS: 1 days | Discharge: HOME | End: 2019-02-21
Attending: EMERGENCY MEDICINE | Admitting: EMERGENCY MEDICINE
Payer: MEDICARE

## 2019-02-21 VITALS
HEART RATE: 73 BPM | SYSTOLIC BLOOD PRESSURE: 146 MMHG | DIASTOLIC BLOOD PRESSURE: 75 MMHG | TEMPERATURE: 99 F | RESPIRATION RATE: 16 BRPM | OXYGEN SATURATION: 99 % | WEIGHT: 149.91 LBS

## 2019-02-21 VITALS
SYSTOLIC BLOOD PRESSURE: 156 MMHG | TEMPERATURE: 98 F | OXYGEN SATURATION: 99 % | RESPIRATION RATE: 18 BRPM | HEART RATE: 75 BPM | DIASTOLIC BLOOD PRESSURE: 70 MMHG

## 2019-02-21 DIAGNOSIS — I10 ESSENTIAL (PRIMARY) HYPERTENSION: ICD-10-CM

## 2019-02-21 DIAGNOSIS — Z79.899 OTHER LONG TERM (CURRENT) DRUG THERAPY: ICD-10-CM

## 2019-02-21 DIAGNOSIS — R10.84 GENERALIZED ABDOMINAL PAIN: ICD-10-CM

## 2019-02-21 DIAGNOSIS — Z95.0 PRESENCE OF CARDIAC PACEMAKER: Chronic | ICD-10-CM

## 2019-02-21 DIAGNOSIS — R07.89 OTHER CHEST PAIN: ICD-10-CM

## 2019-02-21 DIAGNOSIS — Z91.041 RADIOGRAPHIC DYE ALLERGY STATUS: ICD-10-CM

## 2019-02-21 PROCEDURE — 93010 ELECTROCARDIOGRAM REPORT: CPT

## 2019-02-21 PROCEDURE — 71045 X-RAY EXAM CHEST 1 VIEW: CPT | Mod: 26

## 2019-02-21 PROCEDURE — 80053 COMPREHEN METABOLIC PANEL: CPT

## 2019-02-21 PROCEDURE — 99284 EMERGENCY DEPT VISIT MOD MDM: CPT | Mod: 25

## 2019-02-21 PROCEDURE — 83690 ASSAY OF LIPASE: CPT

## 2019-02-21 PROCEDURE — 84484 ASSAY OF TROPONIN QUANT: CPT

## 2019-02-21 PROCEDURE — 85025 COMPLETE CBC W/AUTO DIFF WBC: CPT

## 2019-02-21 PROCEDURE — 82553 CREATINE MB FRACTION: CPT

## 2019-02-21 PROCEDURE — 74176 CT ABD & PELVIS W/O CONTRAST: CPT | Mod: 26

## 2019-02-21 PROCEDURE — 96374 THER/PROPH/DIAG INJ IV PUSH: CPT

## 2019-02-21 PROCEDURE — 82550 ASSAY OF CK (CPK): CPT

## 2019-02-21 PROCEDURE — 93005 ELECTROCARDIOGRAM TRACING: CPT

## 2019-02-21 PROCEDURE — 36415 COLL VENOUS BLD VENIPUNCTURE: CPT

## 2019-02-21 PROCEDURE — 71045 X-RAY EXAM CHEST 1 VIEW: CPT

## 2019-02-21 PROCEDURE — 83605 ASSAY OF LACTIC ACID: CPT

## 2019-02-21 PROCEDURE — 74176 CT ABD & PELVIS W/O CONTRAST: CPT

## 2019-02-21 RX ORDER — FAMOTIDINE 10 MG/ML
1 INJECTION INTRAVENOUS
Qty: 30 | Refills: 0
Start: 2019-02-21 | End: 2019-03-07

## 2019-02-21 RX ORDER — SODIUM CHLORIDE 9 MG/ML
1000 INJECTION INTRAMUSCULAR; INTRAVENOUS; SUBCUTANEOUS ONCE
Qty: 0 | Refills: 0 | Status: COMPLETED | OUTPATIENT
Start: 2019-02-21 | End: 2019-02-21

## 2019-02-21 RX ORDER — FAMOTIDINE 10 MG/ML
20 INJECTION INTRAVENOUS ONCE
Qty: 0 | Refills: 0 | Status: COMPLETED | OUTPATIENT
Start: 2019-02-21 | End: 2019-02-21

## 2019-02-21 RX ORDER — IBUPROFEN 200 MG
600 TABLET ORAL ONCE
Qty: 0 | Refills: 0 | Status: COMPLETED | OUTPATIENT
Start: 2019-02-21 | End: 2019-02-21

## 2019-02-21 RX ORDER — ACETAMINOPHEN 500 MG
650 TABLET ORAL ONCE
Qty: 0 | Refills: 0 | Status: DISCONTINUED | OUTPATIENT
Start: 2019-02-21 | End: 2019-02-21

## 2019-02-21 RX ADMIN — Medication 600 MILLIGRAM(S): at 17:17

## 2019-02-21 RX ADMIN — SODIUM CHLORIDE 2000 MILLILITER(S): 9 INJECTION INTRAMUSCULAR; INTRAVENOUS; SUBCUTANEOUS at 15:34

## 2019-02-21 RX ADMIN — FAMOTIDINE 20 MILLIGRAM(S): 10 INJECTION INTRAVENOUS at 15:34

## 2019-02-21 NOTE — ED ADULT NURSE NOTE - CHPI ED NUR SYMPTOMS NEG
no tingling/no dizziness/no fever/no weakness/no decreased eating/drinking/no chills/no nausea/no vomiting

## 2019-02-21 NOTE — ED ADULT TRIAGE NOTE - CHIEF COMPLAINT QUOTE
Pt states " I was sick all day yesterday, I have burning in my stomach and it feels weak". Denies diarrhea.  EKG in progress.

## 2019-02-21 NOTE — ED PROVIDER NOTE - CLINICAL SUMMARY MEDICAL DECISION MAKING FREE TEXT BOX
89 yo F with pmh of HTN, GERD, PPM w complaints of diffuse burning sensation to abdomen and chest for two days, no assoc f/c, vomiting, diarrhea.  - labs, imaging and reeval 89 yo F with pmh of HTN, GERD, PPM w complaints of diffuse burning sensation to abdomen and chest for two days, no assoc f/c, vomiting, diarrhea.   - labs, given age will obtain imaging- CT a/p, r/o cardiac etiology,  and reeval

## 2019-02-21 NOTE — ED PROVIDER NOTE - OBJECTIVE STATEMENT
89 yo F with pmh of HTN, GERD, PPM w complaints of diffuse burning sensation to abdomen and chest for two days, no assoc f/c, vomiting, diarrhea. 89 yo F with pmh of HTN, GERD, PPM w complaints of diffuse burning sensation to abdomen and chest for two days, no assoc f/c, vomiting, diarrhea. Feels as if stomach is "sick" and that GERD may be affecting it, radiating to chest. No assoc GI bleed sx, radiation of pain, diaphoresis, shortness of breath, cough, f/c, leg swelling, n/v. Pt has not tried anything for symptoms, no other aggravating or relieving factors.

## 2019-02-21 NOTE — ED PROVIDER NOTE - PROGRESS NOTE DETAILS
Discussed with pt results of work up, strict return precautions, and need for follow up.  Pt expressed understanding and agrees with plan.  continue w antacid as outpt work up w no acute process, improved after meds in ED including ppi, Discussed with pt results of work up, strict return precautions, and need for follow up.  Pt expressed understanding and agrees with plan.  continue w antacid as outpt

## 2019-02-21 NOTE — ED ADULT NURSE NOTE - OBJECTIVE STATEMENT
89 year old female patient, A+OX3, with c/o of epigastric burning x2days. Denies chest pain, n/v/d/f, michael, sob.  Hx of GERD.  Breathing easily and unlabored, speaking full sentences without difficulty.  Denies taking medications for pain.  Pacemaker noted to L upper chest wall.  Denies blood thinner usage.

## 2019-02-21 NOTE — ED PROVIDER NOTE - CARE PROVIDER_API CALL
Aletha Alejandro (DO)  Internal Medicine; Preventive Medicine  [address]  [place], [place] [address]  Phone: [phone]  Fax: [phone]  Follow Up Time:

## 2019-06-07 ENCOUNTER — NON-APPOINTMENT (OUTPATIENT)
Age: 84
End: 2019-06-07

## 2019-06-07 ENCOUNTER — APPOINTMENT (OUTPATIENT)
Dept: OPHTHALMOLOGY | Facility: CLINIC | Age: 84
End: 2019-06-07
Payer: MEDICARE

## 2019-06-07 PROCEDURE — 92012 INTRM OPH EXAM EST PATIENT: CPT

## 2019-07-31 ENCOUNTER — INPATIENT (INPATIENT)
Facility: HOSPITAL | Age: 84
LOS: 0 days | Discharge: HOME | DRG: 305 | End: 2019-08-01
Attending: STUDENT IN AN ORGANIZED HEALTH CARE EDUCATION/TRAINING PROGRAM | Admitting: STUDENT IN AN ORGANIZED HEALTH CARE EDUCATION/TRAINING PROGRAM
Payer: COMMERCIAL

## 2019-07-31 VITALS
RESPIRATION RATE: 18 BRPM | HEART RATE: 75 BPM | TEMPERATURE: 98 F | OXYGEN SATURATION: 99 % | DIASTOLIC BLOOD PRESSURE: 88 MMHG | WEIGHT: 156.31 LBS | SYSTOLIC BLOOD PRESSURE: 191 MMHG

## 2019-07-31 DIAGNOSIS — N39.0 URINARY TRACT INFECTION, SITE NOT SPECIFIED: ICD-10-CM

## 2019-07-31 DIAGNOSIS — Z95.0 PRESENCE OF CARDIAC PACEMAKER: Chronic | ICD-10-CM

## 2019-07-31 DIAGNOSIS — K21.9 GASTRO-ESOPHAGEAL REFLUX DISEASE WITHOUT ESOPHAGITIS: ICD-10-CM

## 2019-07-31 DIAGNOSIS — Z95.0 PRESENCE OF CARDIAC PACEMAKER: ICD-10-CM

## 2019-07-31 DIAGNOSIS — I25.10 ATHEROSCLEROTIC HEART DISEASE OF NATIVE CORONARY ARTERY WITHOUT ANGINA PECTORIS: ICD-10-CM

## 2019-07-31 DIAGNOSIS — I16.1 HYPERTENSIVE EMERGENCY: ICD-10-CM

## 2019-07-31 DIAGNOSIS — D64.9 ANEMIA, UNSPECIFIED: ICD-10-CM

## 2019-07-31 DIAGNOSIS — Z91.89 OTHER SPECIFIED PERSONAL RISK FACTORS, NOT ELSEWHERE CLASSIFIED: ICD-10-CM

## 2019-07-31 DIAGNOSIS — I16.0 HYPERTENSIVE URGENCY: ICD-10-CM

## 2019-07-31 DIAGNOSIS — R53.1 WEAKNESS: ICD-10-CM

## 2019-07-31 DIAGNOSIS — R63.8 OTHER SYMPTOMS AND SIGNS CONCERNING FOOD AND FLUID INTAKE: ICD-10-CM

## 2019-07-31 PROCEDURE — 71045 X-RAY EXAM CHEST 1 VIEW: CPT | Mod: 26

## 2019-07-31 PROCEDURE — 99285 EMERGENCY DEPT VISIT HI MDM: CPT | Mod: 25

## 2019-07-31 PROCEDURE — 99223 1ST HOSP IP/OBS HIGH 75: CPT | Mod: GC

## 2019-07-31 PROCEDURE — 70450 CT HEAD/BRAIN W/O DYE: CPT | Mod: 26

## 2019-07-31 RX ORDER — CLOPIDOGREL BISULFATE 75 MG/1
75 TABLET, FILM COATED ORAL DAILY
Refills: 0 | Status: DISCONTINUED | OUTPATIENT
Start: 2019-07-31 | End: 2019-08-01

## 2019-07-31 RX ORDER — SODIUM CHLORIDE 9 MG/ML
500 INJECTION INTRAMUSCULAR; INTRAVENOUS; SUBCUTANEOUS ONCE
Refills: 0 | Status: COMPLETED | OUTPATIENT
Start: 2019-07-31 | End: 2019-07-31

## 2019-07-31 RX ORDER — ATORVASTATIN CALCIUM 80 MG/1
20 TABLET, FILM COATED ORAL AT BEDTIME
Refills: 0 | Status: DISCONTINUED | OUTPATIENT
Start: 2019-07-31 | End: 2019-08-01

## 2019-07-31 RX ORDER — PANTOPRAZOLE SODIUM 20 MG/1
40 TABLET, DELAYED RELEASE ORAL
Refills: 0 | Status: DISCONTINUED | OUTPATIENT
Start: 2019-07-31 | End: 2019-08-01

## 2019-07-31 RX ORDER — MELOXICAM 15 MG/1
1 TABLET ORAL
Qty: 0 | Refills: 0 | DISCHARGE

## 2019-07-31 RX ORDER — CELECOXIB 200 MG/1
200 CAPSULE ORAL DAILY
Refills: 0 | Status: DISCONTINUED | OUTPATIENT
Start: 2019-07-31 | End: 2019-07-31

## 2019-07-31 RX ORDER — METOPROLOL TARTRATE 50 MG
50 TABLET ORAL DAILY
Refills: 0 | Status: DISCONTINUED | OUTPATIENT
Start: 2019-08-01 | End: 2019-08-01

## 2019-07-31 RX ORDER — DEXLANSOPRAZOLE 30 MG/1
0 CAPSULE, DELAYED RELEASE ORAL
Qty: 0 | Refills: 0 | DISCHARGE

## 2019-07-31 RX ORDER — AMLODIPINE BESYLATE 2.5 MG/1
5 TABLET ORAL ONCE
Refills: 0 | Status: COMPLETED | OUTPATIENT
Start: 2019-07-31 | End: 2019-07-31

## 2019-07-31 RX ORDER — CEFTRIAXONE 500 MG/1
1000 INJECTION, POWDER, FOR SOLUTION INTRAMUSCULAR; INTRAVENOUS ONCE
Refills: 0 | Status: COMPLETED | OUTPATIENT
Start: 2019-07-31 | End: 2019-07-31

## 2019-07-31 RX ORDER — FAMOTIDINE 10 MG/ML
20 INJECTION INTRAVENOUS ONCE
Refills: 0 | Status: COMPLETED | OUTPATIENT
Start: 2019-07-31 | End: 2019-07-31

## 2019-07-31 RX ORDER — CEFTRIAXONE 500 MG/1
1000 INJECTION, POWDER, FOR SOLUTION INTRAMUSCULAR; INTRAVENOUS EVERY 24 HOURS
Refills: 0 | Status: DISCONTINUED | OUTPATIENT
Start: 2019-07-31 | End: 2019-07-31

## 2019-07-31 RX ORDER — LOSARTAN POTASSIUM 100 MG/1
25 TABLET, FILM COATED ORAL DAILY
Refills: 0 | Status: DISCONTINUED | OUTPATIENT
Start: 2019-07-31 | End: 2019-07-31

## 2019-07-31 RX ORDER — AZITHROMYCIN 500 MG/1
500 TABLET, FILM COATED ORAL ONCE
Refills: 0 | Status: DISCONTINUED | OUTPATIENT
Start: 2019-07-31 | End: 2019-07-31

## 2019-07-31 RX ORDER — LOSARTAN POTASSIUM 100 MG/1
25 TABLET, FILM COATED ORAL DAILY
Refills: 0 | Status: DISCONTINUED | OUTPATIENT
Start: 2019-08-01 | End: 2019-08-01

## 2019-07-31 RX ORDER — NITROGLYCERIN 6.5 MG
1 CAPSULE, EXTENDED RELEASE ORAL
Qty: 0 | Refills: 0 | DISCHARGE

## 2019-07-31 RX ORDER — DIPHENHYDRAMINE HCL 50 MG
25 CAPSULE ORAL ONCE
Refills: 0 | Status: COMPLETED | OUTPATIENT
Start: 2019-07-31 | End: 2019-07-31

## 2019-07-31 RX ORDER — NITROFURANTOIN MACROCRYSTAL 50 MG
100 CAPSULE ORAL EVERY 12 HOURS
Refills: 0 | Status: DISCONTINUED | OUTPATIENT
Start: 2019-07-31 | End: 2019-08-01

## 2019-07-31 RX ORDER — AMLODIPINE BESYLATE 2.5 MG/1
10 TABLET ORAL DAILY
Refills: 0 | Status: DISCONTINUED | OUTPATIENT
Start: 2019-08-01 | End: 2019-08-01

## 2019-07-31 RX ADMIN — SODIUM CHLORIDE 500 MILLILITER(S): 9 INJECTION INTRAMUSCULAR; INTRAVENOUS; SUBCUTANEOUS at 15:43

## 2019-07-31 RX ADMIN — Medication 25 MILLIGRAM(S): at 16:10

## 2019-07-31 RX ADMIN — CEFTRIAXONE 100 MILLIGRAM(S): 500 INJECTION, POWDER, FOR SOLUTION INTRAMUSCULAR; INTRAVENOUS at 15:42

## 2019-07-31 RX ADMIN — FAMOTIDINE 20 MILLIGRAM(S): 10 INJECTION INTRAVENOUS at 17:07

## 2019-07-31 RX ADMIN — AMLODIPINE BESYLATE 5 MILLIGRAM(S): 2.5 TABLET ORAL at 17:07

## 2019-07-31 RX ADMIN — ATORVASTATIN CALCIUM 20 MILLIGRAM(S): 80 TABLET, FILM COATED ORAL at 22:44

## 2019-07-31 NOTE — H&P ADULT - PROBLEM SELECTOR PLAN 5
Patient followed by Dr. Raymond ([place]), on Plavix and Lipitor 20 at home  -C/W Plavix  -C/W Lipitor  -Will get collateral regarding past CAD history, prior stress tests, or any history of cardiac cath C/W pantoprazole 40mg once daily  No active issues

## 2019-07-31 NOTE — H&P ADULT - PROBLEM SELECTOR PLAN 7
F: tolerating PO, no IVF  E: replete K<4, Mg<2  N: Dash/TLC    VTE Prophylaxis: Lovenox SubQ  C: ??  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 Raymond    #Pulmonary Embolism  -H/O PE 50 years ago  -No active issues

## 2019-07-31 NOTE — H&P ADULT - ASSESSMENT
89yo female with CAD?, history of AV block? s/p PPM placed two years ago, HTN, GERD, history of pulmonary embolism (40 years ago) present urinary dysuria, frequency, and generalized weakness for the past couple days admitted for urinary tract infection. 89yo female with CAD?, history of AV block? s/p PPM placed two years ago, HTN, GERD, history of pulmonary embolism (40 years ago) sent from PCP office for elevated blood pressure (SBP>190) with dyusria, frequency, generalized weakness admitted for hypertensive emergency and UTI. 89yo female with CAD?, history of AV block? s/p PPM placed two years ago, HTN, GERD, history of pulmonary embolism (40 years ago) sent from PCP office for elevated blood pressure (SBP>190) with dyusria, frequency, generalized weakness admitted for hypertensive urgency and UTI.

## 2019-07-31 NOTE — H&P ADULT - NSHPLABSRESULTS_GEN_ALL_CORE
.  LABS:                         11.7   6.63  )-----------( 248      ( 2019 14:08 )             34.4         141  |  103  |  21  ----------------------------<  91  4.6   |  28  |  0.90    Ca    10.0      2019 14:08    TPro  7.8  /  Alb  4.3  /  TBili  0.2  /  DBili  x   /  AST  17  /  ALT  10  /  AlkPhos  85  0731    PT/INR - ( 2019 14:08 )   PT: 11.7 sec;   INR: 1.03          PTT - ( 2019 14:08 )  PTT:27.0 sec  Urinalysis Basic - ( 2019 14:08 )    Color: Yellow / Appearance: Clear / S.010 / pH: x  Gluc: x / Ketone: NEGATIVE  / Bili: Negative / Urobili: 0.2 E.U./dL   Blood: x / Protein: NEGATIVE mg/dL / Nitrite: NEGATIVE   Leuk Esterase: Trace / RBC: < 5 /HPF / WBC 5-10 /HPF   Sq Epi: x / Non Sq Epi: 0-5 /HPF / Bacteria: Present /HPF      CARDIAC MARKERS ( 2019 14:08 )  x     / <0.01 ng/mL / x     / x     / x                RADIOLOGY, EKG & ADDITIONAL TESTS: Reviewed.

## 2019-07-31 NOTE — H&P ADULT - PROBLEM SELECTOR PLAN 3
On home metoprolol 50 once daily, amlodipine 10mg once daily  Previously on HCTZ, but possibly d/c'd 2/2 hyponatremia (patient poor historian)  States compliance with medications, took BP meds this AM  -BP has recently been in 190s/90s past couple days (usually runs 140s per patient)  -S/P amlodipine 5mg in ED with recheck BP 160s/70s  -Will consider adding ARB to BP regimen as patient likely with poorly controlled HTN Likely 2/2 UTI as above  Exam non-focal with no signs of CVA or bleed on CT Head  Electrolytes wnl  -Will check TSH, B12, Folate

## 2019-07-31 NOTE — ED PROVIDER NOTE - ATTENDING CONTRIBUTION TO CARE
89 yo fem with PMHx HTN, GERD, electr pacemaker c/o weakness and dizziness - she says she has had urinary dysuria and frequency for the past 2 weeks.  For the past 2 days she has noted that her BP has been elevated on her home machine, generally around 190s over 90s.  She says her dizziness is not spinning or vertiginous, but more general weakness and mild dysequilibrium.  She reports compliance w her home meds.  No fever/chills, headache, hearing loss/tinnitus, vomiting, chest pain, palpitations, SOB/SHI/cough, abdominal pain, back pain, vomiting, diarrhea, blood in stool or melena.      PMHx HTN PPM GERD  PSHx partial hysterectomy, hemorrhoid surgery  Meds nexium, metoprolol ER 50 mg, amlodipine 10 mg, plavix 75 mg  Allergies: IV contrast    Agree with HPI and PE as documented by PA    Dizziness and weakness ? secondary to UTI  Treated with antibiotics and pt found to be mildly dehydrated treated with fluids  Pt requires PT and inpatient tx because unsteady on feet in ED and ? unsafe discharge

## 2019-07-31 NOTE — ED PROVIDER NOTE - PROGRESS NOTE DETAILS
Itchiness during ceftriaxone infusion; airway clear, lungs clear.  Mild red patches to hands.  Ceftriaxone  stopped.  Pepcid and benadryl given.

## 2019-07-31 NOTE — H&P ADULT - ATTENDING COMMENTS
Pt seen and examined at bedside on 7/31/2019 @ 2100    Agree with HPI, ROS as above.     VS, Labs, FH, SH, allergies, medications, imaging reviewed. I personally reviewed the CXR - pacemaker in place, no consolidations. I reviewed the patient's charted records - was last seen at [facility] ED on 2/21/2019 for abdominal pain - discharged home, previously seen earlier in February for rectal spasms. Agree with physical exam as above     A/P: 89yo female with CAD?, history of AV block? s/p PPM placed two years ago, HTN, GERD, history of pulmonary embolism (40 years ago) sent from PCP office for elevated blood pressure (SBP>190) with dysuria, frequency, generalized weakness admitted for hypertensive urgency and UTI.    **Hypertensive urgency  -Resolved - patient may benefit from addition of ARB in AM - will need to assess BP overnight and in AM prior to starting although would also be beneficial given hx of CAD  -CT head without acute intracranial pathology  -C/w home medications    Plan otherwise as outlined above....

## 2019-07-31 NOTE — H&P ADULT - NSHPPHYSICALEXAM_GEN_ALL_CORE
.  VITAL SIGNS:  T(C): 36.8 (07-31-19 @ 17:14), Max: 36.8 (07-31-19 @ 13:25)  T(F): 98.2 (07-31-19 @ 17:14), Max: 98.2 (07-31-19 @ 13:25)  HR: 69 (07-31-19 @ 17:14) (69 - 75)  BP: 160/86 (07-31-19 @ 17:14) (160/86 - 191/88)  BP(mean): --  RR: 18 (07-31-19 @ 17:14) (18 - 18)  SpO2: 94% (07-31-19 @ 17:14) (94% - 99%)  Wt(kg): --    PHYSICAL EXAM:    Constitutional: WDWN resting comfortably in bed; NAD  Head: NC/AT  Eyes: PERRL, EOMI, anicteric sclera  ENT: no nasal discharge; uvula midline, no oropharyngeal erythema or exudates; MMM  Neck: supple; no JVD or thyromegaly  Respiratory: CTA B/L; no W/R/R, no retractions  Cardiac: +S1/S2; RRR; no M/R/G; PMI non-displaced  Gastrointestinal: mild suprapubic tenderness on palpation, ND; no rebound or guarding; +BSx4  Back: spine midline, no bony tenderness or step-offs; no CVAT B/L  Extremities: WWP, no clubbing or cyanosis; no peripheral edema  Musculoskeletal: NROM x4; no joint swelling, tenderness or erythema  Vascular: 2+ radial, femoral, DP/PT pulses B/L  Dermatologic: skin warm, dry and intact; no rashes, wounds, or scars  Lymphatic: no submandibular or cervical LAD  Neurologic: AAOx3; CNII-XII grossly intact; no focal deficits, unable to test gait  Psychiatric: affect and characteristics of appearance, verbalizations, behaviors are appropriate

## 2019-07-31 NOTE — H&P ADULT - NSICDXFAMILYHX_GEN_ALL_CORE_FT
FAMILY HISTORY:  No pertinent family history, No significant family history  Problems influencing health status, No family history of coronary artery disease

## 2019-07-31 NOTE — H&P ADULT - PROBLEM SELECTOR PLAN 4
C/W pantoprazole 40mg once daily  No active issues Hgb 11 on admission, microcytic (baseline 10.6-11, microcytic)  DDx includes more likely iron deficiency anemia given history of gastritis/GERD vs less likely acute blood loss or nutritional deficiency  -Will get iron studies  -Will get B12 and Folate as above  -Trend Hgb, transfuse if Hgb <8 as patient has CAD

## 2019-07-31 NOTE — H&P ADULT - NSICDXPASTMEDICALHX_GEN_ALL_CORE_FT
PAST MEDICAL HISTORY:  Atherosclerosis of coronary artery CAD (coronary artery disease)    Essential hypertension Hypertension    Gastroesophageal reflux disease GERD (gastroesophageal reflux disease)    Hemorrhoid     Pacemaker     Pulmonary embolism with infarction Pulmonary embolism

## 2019-07-31 NOTE — ED ADULT NURSE NOTE - OBJECTIVE STATEMENT
Pt presents complaining of burning, frequent urination for 2 weeks, worsening dizziness and fatigue for 2 days, and hypertension for the last 2 days. Pt reports hx of chronic dizziness without known cause for the past 5 years. Pt characterizes recent dizziness as lightheadedness and nausea. Pt reports maintaining normal PO intake, states no fevers.

## 2019-07-31 NOTE — H&P ADULT - HISTORY OF PRESENT ILLNESS
89yo female with CAD?, PPM, HTN, GERD, c/c dysuria and urinary frequency x2 weeks.   BP 190s/90s past two days with non beritgionous lightheadedness  too weak to walk, cant weak  took home alodipine today     non focal neuro  cxr linear atelectasis  EKG paced, unchanged from past, troponin negative  CT HEAD     fluids (1/2 liter), ceftriaxone (started getting itchy) so stopped it got some pepcid and benadryl for allergic reaction, hasn't gotten 5 of amlodipine  Ucx sent 89yo female with CAD?, history of AV block? s/p PPM placed two years ago, HTN, GERD, history of pulmonary embolism (40 years ago) present urinary dysuria, frequency, and generalized weakness for the past couple days. Patient presented with these complaints to PCP office c/c dysuria and urinary frequency x2 weeks. At that time her BP was in the 190s/90s and she was recommended to come into the ED. In terms of her weakness, she describes it as generalized, non vertiginous, with no falls or LOC, no chest pain/palpitations/SOB, no seizure activity, and no unilateral weakness or changes in sensation. She also denies any recent fevers, chills, or URI symptoms. Patient is on metoprolol succinate 50mg once daily and amlodipine 10mg once daily for blood pressure, reports compliance, but really poor historian and not sure why she's on some medications. She also used to be on HCTZ, unsure why she is no longer on it, but possibly 2/2 hyponatremia.     In the ED, blood pressure was originally 195/88 improved to 160s/80s after 5mg amlodipine, HR 70s-80s, afebrile, saturating 99% on room air. WBC was 6, Hgb 11, electrolytes wnl, CXR with some linear atelectasis of R lower lung. Troponin was negative x1 and EKG showed paced rhythm but no ischemic changes. CT Head was negative for acute bleed. UA was with trace leukocyte esterase, bacteria, no nitrates. Patient was initially given IV Ceftriaxone for UTI but developed itching with the medication, which was then discontinued and patient was given benadryl and pepcid. She also received 500cc bolus NS as well as amlodipine 5mg as above mentioned. She was admitted for urinary tract infection and dehydration. 89yo female with CAD?, history of AV block? s/p PPM placed two years ago, HTN, GERD, history of pulmonary embolism (40 years ago) present urinary dysuria, frequency, and generalized weakness for the past couple days. Patient presented with these complaints to PCP office c/c dysuria and urinary frequency past couple days. At that time her BP was in the 190s/90s and she was recommended to come into the ED. In terms of her weakness, she describes it as generalized, non vertiginous, with no falls or LOC, no chest pain/palpitations/SOB, no seizure activity, and no unilateral weakness or changes in sensation. She also denies any recent fevers, chills, or URI symptoms. Patient is on metoprolol succinate 50mg once daily and amlodipine 10mg once daily for blood pressure, reports compliance, but really poor historian and not sure why she's on some medications. She also used to be on HCTZ, unsure why she is no longer on it, but possibly 2/2 hyponatremia.     In the ED, blood pressure was originally 195/88 improved to 160s/80s after 5mg amlodipine, HR 70s-80s, afebrile, saturating 99% on room air. WBC was 6, Hgb 11, electrolytes wnl, CXR with some linear atelectasis of R lower lung. Troponin was negative x1 and EKG showed paced rhythm but no ischemic changes. CT Head was negative for acute bleed. UA was with trace leukocyte esterase, bacteria, no nitrates. Patient was initially given IV Ceftriaxone for UTI but developed itching with the medication, which was then discontinued and patient was given benadryl and pepcid. She also received 500cc bolus NS as well as amlodipine 5mg as above mentioned. She was admitted for urinary tract infection and dehydration.

## 2019-07-31 NOTE — ED PROVIDER NOTE - OBJECTIVE STATEMENT
89 yo fem with PMHx HTN, GERD, electr pacemaker c/o weakness and dizziness - she says she has had urinary dysuria and frequency for the past 2 weeks.  For the past 2 days she has noted that her BP has been elevated on her home machine, generally around 190s over 90s.  She says her dizziness is not spinning or vertiginous, but more general weakness and mild dysequilibrium.  She reports compliance w her home meds.  No fever/chills, headache, hearing loss/tinnitus, vomiting, chest pain, palpitations, SOB/SHI/cough, abdominal pain, back pain, vomiting, diarrhea, blood in stool or melena.      PMHx HTN PPM GERD  PSHx partial hysterectomy, hemorrhoid surgery  Meds nexium, metoprolol ER 50 mg, amlodipine 10 mg, plavix 75 mg  Allergies: IV contrast

## 2019-07-31 NOTE — ED PROVIDER NOTE - CARE PLAN
Principal Discharge DX:	Dizziness, nonspecific  Secondary Diagnosis:	Hypertension, unspecified type  Secondary Diagnosis:	Acute cystitis without hematuria

## 2019-07-31 NOTE — H&P ADULT - PROBLEM SELECTOR PLAN 6
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 Raymond    #Pulmonary Embolism  -H/O PE 50 years ago  -No active issues Patient followed by Dr. Raymond ([place]), on Plavix and Lipitor 20 at home  -C/W Plavix  -C/W Lipitor  -Will get collateral regarding past CAD history, prior stress tests, or any history of cardiac cath

## 2019-07-31 NOTE — H&P ADULT - PROBLEM SELECTOR PLAN 2
Likely 2/2 UTI as above  Exam non-focal with no signs of CVA or bleed on CT Head  Electrolytes wnl  -Will check TSH, B12, Folate Presented with urinary dysuria and frequency, not meeting SIRS criteria  UA: +trace leukocyte esterase, +bacteria, -nitrates  WBC 6  -Received 500cc NS in ED  -Initially received 1 g IV Ceftriaxone in ED but started developing itching in arms, abx was discontinued, patient given pepcid and benadryl, and reported improvement in symptoms; no signs of anaphylaxis   -History of prior UTIs, recent prescription of macrobid in outpatient records, unsure what prior cultures have grown  -Plan to treat with 3 day course of oral bactrim for acute uncomplicated cystitis  -F/U Ucx Presented with urinary dysuria and frequency, not meeting SIRS criteria  UA: +trace leukocyte esterase, +bacteria, -nitrates  WBC 6  -Received 500cc NS in ED  -Initially received 1 g IV Ceftriaxone in ED but started developing itching in arms, abx was discontinued, patient given pepcid and benadryl, and reported improvement in symptoms; no signs of anaphylaxis   -History of prior UTIs, recent prescription of macrobid in outpatient records, unsure what prior cultures have grown  -Plan to treat with 3 day course of oral macrobid for acute uncomplicated cystitis  -F/U Ucx

## 2019-07-31 NOTE — H&P ADULT - PROBLEM SELECTOR PLAN 8
1) PCP Contacted on Admission: (Y/N) --> Name & Phone #:  2) Date of Contact with PCP:  3) PCP Contacted at Discharge: (Y/N)  4) Summary of Handoff Given to PCP:   5) Post-Discharge Appointment Date and Location: F: tolerating PO, no IVF  E: replete K<4, Mg<2  N: Dash/TLC    VTE Prophylaxis: Lovenox SubQ  C: ??  D: RMF

## 2019-07-31 NOTE — H&P ADULT - PROBLEM SELECTOR PLAN 1
Presented with urinary dysuria and frequency, not meeting SIRS criteria  UA: +trace leukocyte esterase, +bacteria, -nitrates  WBC 6  -Received 500cc NS in ED  -Initially received 1 g IV Ceftriaxone in ED but started developing itching in arms, abx was discontinued, patient given pepcid and benadryl, and reported improvement in symptoms; no signs of anaphylaxis   -History of prior UTIs, recent prescription of macrobid in outpatient records, unsure what prior cultures have grown  -Plan to treat with 3 day course of oral bactrim for acute uncomplicated cystitis  -F/U Ucx BP 190s/90 on arrival with generalized weakness  On home metoprolol 50 once daily, amlodipine 10mg once daily  Previously on HCTZ, but possibly d/c'd 2/2 hyponatremia (patient poor historian)  States compliance with medications, took BP meds this AM  -BP has recently been in 190s/90s past couple days (usually runs 140s per patient)  -S/P amlodipine 5mg in ED with recheck BP 160s/70s  -Will consider adding ARB to BP regimen as patient likely with poorly controlled HTN

## 2019-07-31 NOTE — ED PROVIDER NOTE - PHYSICAL EXAMINATION
CONSTITUTIONAL: elderly fem in NAD  SKIN: Normal color and turgor. No rash.    HEAD: NC/AT.  EYES: Conjunctiva clear. EOMI. PERRL.    ENT: Airway patent, OP without erythema, tonsillar swelling or exudate; uvula midline without swelling. Nasal mucosa clear, no rhinorrhea.   RESPIRATORY:  Breathing non-labored. No retractions or accessory muscle use.  Lungs CTA bilat.  CARDIOVASCULAR:  RRR, S1S2. No M/R/G.      GI:  Abdomen soft, nontender.    MSK: Neck supple with painless ROM.  No lower extremity edema or calf tenderness.  No joint swelling or ROM limitation.  NEURO: Alert and oriented; CN II-XII intact. Speech clear. 5/5 strength in all extremities.  SILT all extremities.  F-N intact.  Able to stand but could not walk more than 1 step at bedside due to generalized weakness.

## 2019-07-31 NOTE — ED PROVIDER NOTE - CLINICAL SUMMARY MEDICAL DECISION MAKING FREE TEXT BOX
89 yo fem with HTN, PPM (EMR notes CAD but patient unaware of this) c/o dizziness which seems to be general weakness with nonfocal neuro exam.  AVSS. Appears nontoxic but weak and unable to ambulate well  -  likely fall risk; EKG unchanged from past, troponin negative, CT head negative, CXR clear other than linear atelectasis right base, no significant lab abnormalities.  Plan:  will Tx UTI, treat for potential mild volume depletion, give an extra dose of amlodipine, and admit.  Needs PT eval, possible rehab referral.

## 2019-08-01 ENCOUNTER — TRANSCRIPTION ENCOUNTER (OUTPATIENT)
Age: 84
End: 2019-08-01

## 2019-08-01 VITALS
HEART RATE: 70 BPM | DIASTOLIC BLOOD PRESSURE: 68 MMHG | SYSTOLIC BLOOD PRESSURE: 146 MMHG | RESPIRATION RATE: 18 BRPM | OXYGEN SATURATION: 98 % | TEMPERATURE: 98 F

## 2019-08-01 LAB
ANION GAP SERPL CALC-SCNC: 9 MMOL/L — SIGNIFICANT CHANGE UP (ref 5–17)
BASOPHILS # BLD AUTO: 0.03 K/UL — SIGNIFICANT CHANGE UP (ref 0–0.2)
BASOPHILS NFR BLD AUTO: 0.8 % — SIGNIFICANT CHANGE UP (ref 0–2)
BUN SERPL-MCNC: 17 MG/DL — SIGNIFICANT CHANGE UP (ref 7–23)
CALCIUM SERPL-MCNC: 8.9 MG/DL — SIGNIFICANT CHANGE UP (ref 8.4–10.5)
CHLORIDE SERPL-SCNC: 105 MMOL/L — SIGNIFICANT CHANGE UP (ref 96–108)
CO2 SERPL-SCNC: 25 MMOL/L — SIGNIFICANT CHANGE UP (ref 22–31)
CREAT SERPL-MCNC: 0.83 MG/DL — SIGNIFICANT CHANGE UP (ref 0.5–1.3)
CULTURE RESULTS: NO GROWTH — SIGNIFICANT CHANGE UP
EOSINOPHIL # BLD AUTO: 0.14 K/UL — SIGNIFICANT CHANGE UP (ref 0–0.5)
EOSINOPHIL NFR BLD AUTO: 3.5 % — SIGNIFICANT CHANGE UP (ref 0–6)
FERRITIN SERPL-MCNC: 16 NG/ML — SIGNIFICANT CHANGE UP (ref 15–150)
FOLATE SERPL-MCNC: >20 NG/ML — SIGNIFICANT CHANGE UP
GLUCOSE SERPL-MCNC: 86 MG/DL — SIGNIFICANT CHANGE UP (ref 70–99)
HCT VFR BLD CALC: 32.9 % — LOW (ref 34.5–45)
HGB BLD-MCNC: 11 G/DL — LOW (ref 11.5–15.5)
IMM GRANULOCYTES NFR BLD AUTO: 0.3 % — SIGNIFICANT CHANGE UP (ref 0–1.5)
IRON SATN MFR SERPL: 101 UG/DL — SIGNIFICANT CHANGE UP (ref 30–160)
IRON SATN MFR SERPL: 37 % — SIGNIFICANT CHANGE UP (ref 14–50)
LYMPHOCYTES # BLD AUTO: 1.51 K/UL — SIGNIFICANT CHANGE UP (ref 1–3.3)
LYMPHOCYTES # BLD AUTO: 37.8 % — SIGNIFICANT CHANGE UP (ref 13–44)
MAGNESIUM SERPL-MCNC: 2.2 MG/DL — SIGNIFICANT CHANGE UP (ref 1.6–2.6)
MCHC RBC-ENTMCNC: 24.4 PG — LOW (ref 27–34)
MCHC RBC-ENTMCNC: 33.4 GM/DL — SIGNIFICANT CHANGE UP (ref 32–36)
MCV RBC AUTO: 73.1 FL — LOW (ref 80–100)
MONOCYTES # BLD AUTO: 0.19 K/UL — SIGNIFICANT CHANGE UP (ref 0–0.9)
MONOCYTES NFR BLD AUTO: 4.8 % — SIGNIFICANT CHANGE UP (ref 2–14)
NEUTROPHILS # BLD AUTO: 2.11 K/UL — SIGNIFICANT CHANGE UP (ref 1.8–7.4)
NEUTROPHILS NFR BLD AUTO: 52.8 % — SIGNIFICANT CHANGE UP (ref 43–77)
NRBC # BLD: 0 /100 WBCS — SIGNIFICANT CHANGE UP (ref 0–0)
PLATELET # BLD AUTO: 197 K/UL — SIGNIFICANT CHANGE UP (ref 150–400)
POTASSIUM SERPL-MCNC: 3.9 MMOL/L — SIGNIFICANT CHANGE UP (ref 3.5–5.3)
POTASSIUM SERPL-SCNC: 3.9 MMOL/L — SIGNIFICANT CHANGE UP (ref 3.5–5.3)
RBC # BLD: 4.5 M/UL — SIGNIFICANT CHANGE UP (ref 3.8–5.2)
RBC # FLD: 15.9 % — HIGH (ref 10.3–14.5)
SODIUM SERPL-SCNC: 139 MMOL/L — SIGNIFICANT CHANGE UP (ref 135–145)
SPECIMEN SOURCE: SIGNIFICANT CHANGE UP
TIBC SERPL-MCNC: 276 UG/DL — SIGNIFICANT CHANGE UP (ref 220–430)
TSH SERPL-MCNC: 1.02 UIU/ML — SIGNIFICANT CHANGE UP (ref 0.35–4.94)
UIBC SERPL-MCNC: 175 UG/DL — SIGNIFICANT CHANGE UP (ref 110–370)
VIT B12 SERPL-MCNC: 1197 PG/ML — SIGNIFICANT CHANGE UP (ref 232–1245)
WBC # BLD: 3.99 K/UL — SIGNIFICANT CHANGE UP (ref 3.8–10.5)
WBC # FLD AUTO: 3.99 K/UL — SIGNIFICANT CHANGE UP (ref 3.8–10.5)

## 2019-08-01 PROCEDURE — 82728 ASSAY OF FERRITIN: CPT

## 2019-08-01 PROCEDURE — 84443 ASSAY THYROID STIM HORMONE: CPT

## 2019-08-01 PROCEDURE — 99285 EMERGENCY DEPT VISIT HI MDM: CPT | Mod: 25

## 2019-08-01 PROCEDURE — 82746 ASSAY OF FOLIC ACID SERUM: CPT

## 2019-08-01 PROCEDURE — 96375 TX/PRO/DX INJ NEW DRUG ADDON: CPT

## 2019-08-01 PROCEDURE — 85025 COMPLETE CBC W/AUTO DIFF WBC: CPT

## 2019-08-01 PROCEDURE — 96374 THER/PROPH/DIAG INJ IV PUSH: CPT

## 2019-08-01 PROCEDURE — 97161 PT EVAL LOW COMPLEX 20 MIN: CPT

## 2019-08-01 PROCEDURE — 36415 COLL VENOUS BLD VENIPUNCTURE: CPT

## 2019-08-01 PROCEDURE — 99239 HOSP IP/OBS DSCHRG MGMT >30: CPT

## 2019-08-01 PROCEDURE — 80053 COMPREHEN METABOLIC PANEL: CPT

## 2019-08-01 PROCEDURE — 70450 CT HEAD/BRAIN W/O DYE: CPT

## 2019-08-01 PROCEDURE — 83540 ASSAY OF IRON: CPT

## 2019-08-01 PROCEDURE — 85730 THROMBOPLASTIN TIME PARTIAL: CPT

## 2019-08-01 PROCEDURE — 80048 BASIC METABOLIC PNL TOTAL CA: CPT

## 2019-08-01 PROCEDURE — 87086 URINE CULTURE/COLONY COUNT: CPT

## 2019-08-01 PROCEDURE — 85610 PROTHROMBIN TIME: CPT

## 2019-08-01 PROCEDURE — 82607 VITAMIN B-12: CPT

## 2019-08-01 PROCEDURE — 83550 IRON BINDING TEST: CPT

## 2019-08-01 PROCEDURE — 83735 ASSAY OF MAGNESIUM: CPT

## 2019-08-01 PROCEDURE — 84484 ASSAY OF TROPONIN QUANT: CPT

## 2019-08-01 PROCEDURE — 81001 URINALYSIS AUTO W/SCOPE: CPT

## 2019-08-01 PROCEDURE — 71045 X-RAY EXAM CHEST 1 VIEW: CPT

## 2019-08-01 RX ORDER — DOCUSATE SODIUM 100 MG
100 CAPSULE ORAL ONCE
Refills: 0 | Status: COMPLETED | OUTPATIENT
Start: 2019-08-01 | End: 2019-08-01

## 2019-08-01 RX ORDER — LOSARTAN POTASSIUM 100 MG/1
1 TABLET, FILM COATED ORAL
Qty: 30 | Refills: 0
Start: 2019-08-01 | End: 2019-08-30

## 2019-08-01 RX ORDER — SENNA PLUS 8.6 MG/1
1 TABLET ORAL ONCE
Refills: 0 | Status: COMPLETED | OUTPATIENT
Start: 2019-08-01 | End: 2019-08-01

## 2019-08-01 RX ADMIN — Medication 100 MILLIGRAM(S): at 11:19

## 2019-08-01 RX ADMIN — CLOPIDOGREL BISULFATE 75 MILLIGRAM(S): 75 TABLET, FILM COATED ORAL at 11:19

## 2019-08-01 RX ADMIN — LOSARTAN POTASSIUM 25 MILLIGRAM(S): 100 TABLET, FILM COATED ORAL at 05:55

## 2019-08-01 RX ADMIN — Medication 100 MILLIGRAM(S): at 01:02

## 2019-08-01 RX ADMIN — SENNA PLUS 1 TABLET(S): 8.6 TABLET ORAL at 11:19

## 2019-08-01 RX ADMIN — Medication 50 MILLIGRAM(S): at 05:52

## 2019-08-01 RX ADMIN — AMLODIPINE BESYLATE 10 MILLIGRAM(S): 2.5 TABLET ORAL at 05:52

## 2019-08-01 RX ADMIN — PANTOPRAZOLE SODIUM 40 MILLIGRAM(S): 20 TABLET, DELAYED RELEASE ORAL at 05:53

## 2019-08-01 NOTE — PHYSICAL THERAPY INITIAL EVALUATION ADULT - GAIT DEVIATIONS NOTED, PT EVAL
decreased step length/decreased kaley/wide step width, increased JONATHAN/decreased weight-shifting ability

## 2019-08-01 NOTE — DISCHARGE NOTE PROVIDER - NSDCFUADDAPPT_GEN_ALL_CORE_FT
Please follow-up with your PCP, Dr. Jennifer Mckeon within 1-2 weeks following discharge from the hospital to discuss further management of your hypertension.

## 2019-08-01 NOTE — DISCHARGE NOTE PROVIDER - HOSPITAL COURSE
Patient is a 89 year-old female with PMH of HTN, GERD, CAD, and PPM, who presented to the ED from her obgyn office where she was found to have a systolic blood pressure of 190. Patient had no evidence of end organ damage in the ED but at BP of 195/88 (improved to 160/80 with 5 mg of amlodipine) with a HR of 70 and all other vital signs stable. Patient complained of dysuria but was not ultimately found to have a UTI (negative urine culture). Patient was continued on her home anti-hypertensive medications and started on Losartan 100 mg PO daily, and over the  course of her hospital admission, her blood pressure gradually improved. Patient is now medically optimized for discharge to home and must follow up with her primary care physician within 1-2 weeks following discharge from the hospital to assess further management of her hypertension.

## 2019-08-01 NOTE — PHYSICAL THERAPY INITIAL EVALUATION ADULT - ADDITIONAL COMMENTS
Pt reports living alone and has approx 8 steps to enter her home. No stairs are inside home. PLOF includes being independent during ADLs using straight cane. She denied Hx of falls.

## 2019-08-01 NOTE — DISCHARGE NOTE PROVIDER - PROVIDER TOKENS
FREE:[LAST:[Linda],FIRST:[Jennifer],PHONE:[phone]],FAX:[phone]   [phone]    -],ADDRESS:[[address] [place] [address]]]

## 2019-08-01 NOTE — DISCHARGE NOTE NURSING/CASE MANAGEMENT/SOCIAL WORK - NSDCDPATPORTLINK_GEN_ALL_CORE
You can access the [facility] Portal, offered by [facility], by registering with the following website: [email or web address]

## 2019-08-01 NOTE — CONSULT NOTE ADULT - ASSESSMENT
89yo female with CAD?, history of AV block? s/p PPM placed two years ago, HTN, GERD, history of pulmonary embolism (40 years ago) sent from PCP office for elevated blood pressure (SBP>190) with dyusria, frequency, generalized weakness admitted for hypertensive urgency and UTI.    Problem/Plan - 1:  ·  Problem: Hypertensive urgency.  Plan: BP 190s/90 on arrival with generalized weakness  On home metoprolol 50 once daily, amlodipine 10mg once daily  Previously on HCTZ, but possibly d/c'd 2/2 hyponatremia (patient poor historian)  States compliance with medications, took BP meds this AM  -BP has recently been in 190s/90s past couple days (usually runs 140s per patient)  -S/P amlodipine 5mg in ED with recheck BP 160s/70s  -Will consider adding ARB to BP regimen as patient likely with poorly controlled HTN.     Problem/Plan - 2:  ·  Problem: Urinary tract infection.  Plan: Presented with urinary dysuria and frequency, not meeting SIRS criteria  UA: +trace leukocyte esterase, +bacteria, -nitrates  WBC 6  -Received 500cc NS in ED  -Initially received 1 g IV Ceftriaxone in ED but started developing itching in arms, abx was discontinued, patient given pepcid and benadryl, and reported improvement in symptoms; no signs of anaphylaxis   -History of prior UTIs, recent prescription of macrobid in outpatient records, unsure what prior cultures have grown  -Plan to treat with 3 day course of oral macrobid for acute uncomplicated cystitis  -F/U Ucx.     Problem/Plan - 3:  ·  Problem: Weakness.  Plan: Likely 2/2 UTI as above  Exam non-focal with no signs of CVA or bleed on CT Head  Electrolytes wnl  -Will check TSH, B12, Folate.     Problem/Plan - 4:  ·  Problem: Anemia.  Plan: Hgb 11 on admission, microcytic (baseline 10.6-11, microcytic)  DDx includes more likely iron deficiency anemia given history of gastritis/GERD vs less likely acute blood loss or nutritional deficiency  -Will get iron studies  -Will get B12 and Folate as above  -Trend Hgb, transfuse if Hgb <8 as patient has CAD.     Problem/Plan - 5:  ·  Problem: GERD (gastroesophageal reflux disease).  Plan: C/W pantoprazole 40mg once daily  No active issues.     Problem/Plan - 6:  Problem: Coronary artery disease. Plan: Patient followed by Dr. Raymond ([place]), on Plavix and Lipitor 20 at home  -C/W Plavix  -C/W Lipitor  -Will get collateral regarding past CAD history, prior stress tests, or any history of cardiac cath.    Problem/Plan - 7:  ·  Problem: Pacemaker.  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 Raymond    #Pulmonary Embolism  -H/O PE 50 years ago  -No active issues.     Problem/Plan - 8:  ·  Problem: Nutrition, metabolism, and development symptoms.  Plan: F: tolerating PO, no IVF  E: replete K<4, Mg<2  N: Dash/TLC    VTE Prophylaxis: Lovenox SubQ  C: ??  D: RMF.

## 2019-08-01 NOTE — PHYSICAL THERAPY INITIAL EVALUATION ADULT - PERTINENT HX OF CURRENT PROBLEM, REHAB EVAL
89yo female with CAD, history of AV block s/p PPM placed two years ago, HTN, GERD, history of pulmonary embolism (40 years ago) present urinary dysuria, frequency, and generalized weakness for the past couple days. Patient presented with these complaints to PCP office c/c dysuria and urinary frequency past couple days.

## 2019-08-01 NOTE — DISCHARGE NOTE PROVIDER - NSDCCPCAREPLAN_GEN_ALL_CORE_FT
PRINCIPAL DISCHARGE DIAGNOSIS  Diagnosis: Hypertensive urgency  Assessment and Plan of Treatment: You were admitted to [facility] for hypetensive urgency, which is when your systolic blood pressure exceeds 180. No evidence of damange to organs was found. You were prescribed 100 mg Losartan PO once daily in addition to your other blood pressure medications to help bring down your daily blood pressures. Please follow-up with your primary care physician within 1-2 weeks following dicharge to assess continued managment of your blood pressure. If you should develop severe headache, chest pain, nausea, vomiting, SOB, or seizure, please visit the Emergency Department for a medical evaluation.      SECONDARY DISCHARGE DIAGNOSES  Diagnosis: CAD (coronary artery disease)  Assessment and Plan of Treatment: You have a past history of CAD with a permanently placed pacekmaker. Continue taking your home anti-cogulation as prescribed and follow-up with your primcary care physician within 1-2 weeks following discharge from the hospital for futher evaluation.    Diagnosis: Chronic GERD  Assessment and Plan of Treatment: You have a past history of GERD or acid reflux. GERD is a disease in which stomach acid or bile irritates the  lining of your digestive tract. Please continue taking your home medication for GERD as needed.       Diagnosis: Hypertension, unspecified type  Assessment and Plan of Treatment: You have a Hx of being diagnosed with hypertension. You were prescribed 100 mg Losartan PO daily in addition to your other blood pressure medications. Please resume your normal hypertension medications and begin taking the Losartan as well.

## 2019-08-01 NOTE — CONSULT NOTE ADULT - SUBJECTIVE AND OBJECTIVE BOX
Patient is a 89y old  Female who presents with a chief complaint of Hypertension (01 Aug 2019 14:24)       HPI:  89yo female with CAD?, history of AV block? s/p PPM placed two years ago, HTN, GERD, history of pulmonary embolism (40 years ago) present urinary dysuria, frequency, and generalized weakness for the past couple days. Patient presented with these complaints to PCP office c/c dysuria and urinary frequency past couple days. At that time her BP was in the 190s/90s and she was recommended to come into the ED. In terms of her weakness, she describes it as generalized, non vertiginous, with no falls or LOC, no chest pain/palpitations/SOB, no seizure activity, and no unilateral weakness or changes in sensation. She also denies any recent fevers, chills, or URI symptoms. Patient is on metoprolol succinate 50mg once daily and amlodipine 10mg once daily for blood pressure, reports compliance, but really poor historian and not sure why she's on some medications. She also used to be on HCTZ, unsure why she is no longer on it, but possibly 2/2 hyponatremia.     In the ED, blood pressure was originally 195/88 improved to 160s/80s after 5mg amlodipine, HR 70s-80s, afebrile, saturating 99% on room air. WBC was 6, Hgb 11, electrolytes wnl, CXR with some linear atelectasis of R lower lung. Troponin was negative x1 and EKG showed paced rhythm but no ischemic changes. CT Head was negative for acute bleed. UA was with trace leukocyte esterase, bacteria, no nitrates. Patient was initially given IV Ceftriaxone for UTI but developed itching with the medication, which was then discontinued and patient was given benadryl and pepcid. She also received 500cc bolus NS as well as amlodipine 5mg as above mentioned. She was admitted for urinary tract infection and dehydration. (2019 16:55)      PAST MEDICAL & SURGICAL HISTORY:  Pacemaker  Hemorrhoid  Essential hypertension: Hypertension  Pulmonary embolism with infarction: Pulmonary embolism  Atherosclerosis of coronary artery: CAD (coronary artery disease)  Gastroesophageal reflux disease: GERD (gastroesophageal reflux disease)  Artificial pacemaker  Status post total hysterectomy: S/P hysterectomy      MEDICATIONS  (STANDING):  amLODIPine   Tablet 10 milliGRAM(s) Oral daily  atorvastatin 20 milliGRAM(s) Oral at bedtime  clopidogrel Tablet 75 milliGRAM(s) Oral daily  losartan 25 milliGRAM(s) Oral daily  metoprolol succinate ER 50 milliGRAM(s) Oral daily  pantoprazole    Tablet 40 milliGRAM(s) Oral before breakfast    MEDICATIONS  (PRN):      Social History per patient:             -  (- )  tobacco,  (- )   IVDA,  (- )  iliicit drug use,  (- )  alcohol           - Occupation:  X           - Home Living Status: lives alone in a first floor apartment, 8 steps to enter           - Home Care Services: none    Functional Level Prior to Admission per patient:                     - ADL's: independent           - ambulates with a cane    FAMILY HISTORY:  Problems influencing health status: No family history of coronary artery disease  No pertinent family history: No significant family history      CBC Full  -  ( 01 Aug 2019 06:07 )  WBC Count : 3.99 K/uL  RBC Count : 4.50 M/uL  Hemoglobin : 11.0 g/dL  Hematocrit : 32.9 %  Platelet Count - Automated : 197 K/uL  Mean Cell Volume : 73.1 fl  Mean Cell Hemoglobin : 24.4 pg  Mean Cell Hemoglobin Concentration : 33.4 gm/dL  Auto Neutrophil # : 2.11 K/uL  Auto Lymphocyte # : 1.51 K/uL  Auto Monocyte # : 0.19 K/uL  Auto Eosinophil # : 0.14 K/uL  Auto Basophil # : 0.03 K/uL  Auto Neutrophil % : 52.8 %  Auto Lymphocyte % : 37.8 %  Auto Monocyte % : 4.8 %  Auto Eosinophil % : 3.5 %  Auto Basophil % : 0.8 %          139  |  105  |  17  ----------------------------<  86  3.9   |  25  |  0.83    Ca    8.9      01 Aug 2019 06:07  Mg     2.2     08-    TPro  7.8  /  Alb  4.3  /  TBili  0.2  /  DBili  x   /  AST  17  /  ALT  10  /  AlkPhos  85        Urinalysis Basic - ( 2019 14:08 )    Color: Yellow / Appearance: Clear / S.010 / pH: x  Gluc: x / Ketone: NEGATIVE  / Bili: Negative / Urobili: 0.2 E.U./dL   Blood: x / Protein: NEGATIVE mg/dL / Nitrite: NEGATIVE   Leuk Esterase: Trace / RBC: < 5 /HPF / WBC 5-10 /HPF   Sq Epi: x / Non Sq Epi: 0-5 /HPF / Bacteria: Present /HPF          Radiology:    < from: Xray Chest 1 View- PORTABLE-Urgent (19 @ 14:38) >  EXAM:  XR CHEST PORTABLE URGENT 1V                          PROCEDURE DATE:  2019          INTERPRETATION:  Portable Chest x-ray    History: Short of breath, lethargy    Prior studies: Radiograph 2019    Findings: Bibasilar atelectases. There are no pleural effusions. Stable   positioning of left-sided pacemaker. Borderline cardiomegaly. Bones and   soft tissues are unremarkable.    Impression: No active lung disease.        < from: CT Head No Cont (19 @ 14:44) >  EXAM:  CT BRAIN                          PROCEDURE DATE:  2019          INTERPRETATION:  INDICATIONS: Dizziness    TECHNIQUE: Serial axial images were obtained from the skull base to the   vertex without the use of intravenous contrast. Sagittal and coronal   reformatted images were created from the axial data set. Images were   reviewed in the bone, brain and subdural windows.    COMPARISON: Head CT dated 10/11/2018    FINDINGS:    INTRA-AXIAL: No intracranial mass effect, acute hemorrhage, midline shift   or acute transcortical infarct is seen. There are mild areas of   periventricular and subcortical white matter lucency, consistent with   longstanding microvascular disease.   EXTRA-AXIAL: No extra-axial fluid collection is present.   VENTRICLES AND SULCI:  Commensurate with patient age.   VISUALIZED SINUSES: No air-fluid levels are identified.   VISUALIZED MASTOIDS: Clear.  CALVARIUM: Hyperostosis frontalis interna.  MISCELLANEOUS: The native ocular lenses are absent.    IMPRESSION: No acute intracranial hemorrhage, mass effect, or recent   transcortical infarction.              Vital Signs Last 24 Hrs  T(C): 36.9 (01 Aug 2019 09:05), Max: 36.9 (2019 20:12)  T(F): 98.5 (01 Aug 2019 09:05), Max: 98.5 (2019 20:12)  HR: 70 (01 Aug 2019 09:05) (64 - 72)  BP: 146/68 (01 Aug 2019 09:05) (126/64 - 168/75)  BP(mean): --  RR: 18 (01 Aug 2019 09:05) (17 - 18)  SpO2: 98% (01 Aug 2019 09:05) (94% - 99%)    REVIEW OF SYSTEMS:    CONSTITUTIONAL:  fatigue  EYES: No eye pain, visual disturbances, or discharge  ENMT:  No difficulty hearing, tinnitus, vertigo; No sinus or throat pain  NECK: No pain or stiffness  BREASTS: No pain, masses, or nipple discharge  RESPIRATORY: No cough, wheezing, chills or hemoptysis; No shortness of breath  CARDIOVASCULAR: No chest pain, palpitations, dizziness, or leg swelling  GASTROINTESTINAL: No abdominal or epigastric pain. No nausea, vomiting, or hematemesis; No diarrhea or constipation. No melena or hematochezia.  GENITOURINARY: No dysuria, frequency, hematuria, or incontinence  NEUROLOGICAL: No headaches, memory loss, loss of strength, numbness, or tremors  SKIN: No itching, burning, rashes, or lesions   LYMPH NODES: No enlarged glands  ENDOCRINE: No heat or cold intolerance; No hair loss  MUSCULOSKELETAL: No joint pain or swelling; No muscle, back, or extremity pain  PSYCHIATRIC: No depression, anxiety, mood swings, or difficulty sleeping  HEME/LYMPH: No easy bruising, or bleeding gums  ALLERGY AND IMMUNOLOGIC: No hives or eczema  VASCULAR: no swelling, erythema      Physical Exam: 89 yo AA woman lying in semi Lewis's position, c/o feeling tired    Head: normocephalic, atraumatic    Eyes: PERRLA, EOMI, no nystagmus, sclera anicteric    ENT: nasal discharge, uvula midline, no oropharyngeal erythema/exudate    Neck: supple, negative JVD, negative carotid bruits, no thyromegaly    Chest: CTA bilaterally, neg wheeze/ rhonchi/ rales/ crackles/ egophany    Cardiovascular: regular rate and rhythm, neg murmurs/rubs/gallops    Abdomen: soft, non distended, non tender, negative rebound/guarding, normal bowel sounds, neg hepatosplenomegaly    Extremities: WWP, neg cyanosis/clubbing/edema, negative calf tenderness to palpation, negative Hardik's sign    :     Neurologic Exam:    Alert and oriented to person, place, date/year, speech fluent w/o dysarthria, recent and remote memory intact, repetition intact, comprehension intact    Cranial Nerves:     II:                       pupils equal, round and reactive to light, visual fields intact   III/ IV/VI:             extraocular movements intact, neg nystagmus, neg ptosis  V:                       facial sensation intact, V1-3 normal  VII:                     face symmetric, no droop, normal eye closure and smile  VIII:                    hearing intact to finger rub bilaterally  IX/ X:                 soft palate rise symmetrical  XI:                      head turning, shoulder shrug normal  XII:                     tongue midline    Motor Exam:    Upper Extremities:     RIght:     non focal               negative drift    Left :       non focal               negative drift    Lower Extremities:                 Right:        non focal                 Left:       non focal                   Sensory:    intact to LT/PP in all UE/LE dermatomes    DTR:            = biceps/     triceps/     brachioradialis                      = patella/   medial hamstring/ankle                      neg clonus                      neg Babinski                        Finger to Nose:  wnl    Heel to Shin:  wnl    Rapid Alternating movements:  wnl    Joint Position Sense:  intact    Romberg:  not tested    Tandem Walking:  not tested    Gait:  not tested        PM&R Impression:    1) deconditioned  2) no focal weakness  3) UTI        Recommendations:    1) Physical therapy focusing on therapeutic exercises, bed mobility/transfer out of bed evaluation, progressive ambulation with assistive devices prn.    2) Anticipated Disposition Plan/Recs: d/c home, home/outpatient physical therapy

## 2019-08-06 ENCOUNTER — EMERGENCY (EMERGENCY)
Facility: HOSPITAL | Age: 84
LOS: 1 days | Discharge: HOME | End: 2019-08-06
Attending: EMERGENCY MEDICINE | Admitting: EMERGENCY MEDICINE
Payer: MEDICARE

## 2019-08-06 VITALS
WEIGHT: 149.69 LBS | HEART RATE: 74 BPM | RESPIRATION RATE: 16 BRPM | TEMPERATURE: 99 F | SYSTOLIC BLOOD PRESSURE: 158 MMHG | DIASTOLIC BLOOD PRESSURE: 74 MMHG | OXYGEN SATURATION: 99 % | HEIGHT: 65 IN

## 2019-08-06 VITALS
RESPIRATION RATE: 16 BRPM | SYSTOLIC BLOOD PRESSURE: 180 MMHG | TEMPERATURE: 98 F | HEART RATE: 77 BPM | OXYGEN SATURATION: 97 % | DIASTOLIC BLOOD PRESSURE: 74 MMHG

## 2019-08-06 DIAGNOSIS — Z79.899 OTHER LONG TERM (CURRENT) DRUG THERAPY: ICD-10-CM

## 2019-08-06 DIAGNOSIS — R30.0 DYSURIA: ICD-10-CM

## 2019-08-06 DIAGNOSIS — E86.0 DEHYDRATION: ICD-10-CM

## 2019-08-06 DIAGNOSIS — I10 ESSENTIAL (PRIMARY) HYPERTENSION: ICD-10-CM

## 2019-08-06 DIAGNOSIS — R10.9 UNSPECIFIED ABDOMINAL PAIN: ICD-10-CM

## 2019-08-06 DIAGNOSIS — Z95.0 PRESENCE OF CARDIAC PACEMAKER: Chronic | ICD-10-CM

## 2019-08-06 LAB
ALBUMIN SERPL ELPH-MCNC: 3.6 G/DL — SIGNIFICANT CHANGE UP (ref 3.3–5)
ALBUMIN SERPL ELPH-MCNC: 4 G/DL — SIGNIFICANT CHANGE UP (ref 3.3–5)
ALP SERPL-CCNC: 78 U/L — SIGNIFICANT CHANGE UP (ref 40–120)
ALP SERPL-CCNC: 85 U/L — SIGNIFICANT CHANGE UP (ref 40–120)
ALT FLD-CCNC: 12 U/L — SIGNIFICANT CHANGE UP (ref 10–45)
ALT FLD-CCNC: SIGNIFICANT CHANGE UP U/L (ref 10–45)
ANION GAP SERPL CALC-SCNC: 10 MMOL/L — SIGNIFICANT CHANGE UP (ref 5–17)
ANION GAP SERPL CALC-SCNC: 8 MMOL/L — SIGNIFICANT CHANGE UP (ref 5–17)
APPEARANCE UR: CLEAR — SIGNIFICANT CHANGE UP
AST SERPL-CCNC: 13 U/L — SIGNIFICANT CHANGE UP (ref 10–40)
AST SERPL-CCNC: SIGNIFICANT CHANGE UP U/L (ref 10–40)
BASOPHILS # BLD AUTO: 0.04 K/UL — SIGNIFICANT CHANGE UP (ref 0–0.2)
BASOPHILS NFR BLD AUTO: 0.3 % — SIGNIFICANT CHANGE UP (ref 0–2)
BILIRUB SERPL-MCNC: 0.2 MG/DL — SIGNIFICANT CHANGE UP (ref 0.2–1.2)
BILIRUB SERPL-MCNC: 0.2 MG/DL — SIGNIFICANT CHANGE UP (ref 0.2–1.2)
BILIRUB UR-MCNC: NEGATIVE — SIGNIFICANT CHANGE UP
BUN SERPL-MCNC: 12 MG/DL — SIGNIFICANT CHANGE UP (ref 7–23)
BUN SERPL-MCNC: 16 MG/DL — SIGNIFICANT CHANGE UP (ref 7–23)
CALCIUM SERPL-MCNC: 9.2 MG/DL — SIGNIFICANT CHANGE UP (ref 8.4–10.5)
CALCIUM SERPL-MCNC: 9.6 MG/DL — SIGNIFICANT CHANGE UP (ref 8.4–10.5)
CHLORIDE SERPL-SCNC: 100 MMOL/L — SIGNIFICANT CHANGE UP (ref 96–108)
CHLORIDE SERPL-SCNC: 107 MMOL/L — SIGNIFICANT CHANGE UP (ref 96–108)
CO2 SERPL-SCNC: 26 MMOL/L — SIGNIFICANT CHANGE UP (ref 22–31)
CO2 SERPL-SCNC: 27 MMOL/L — SIGNIFICANT CHANGE UP (ref 22–31)
COLOR SPEC: YELLOW — SIGNIFICANT CHANGE UP
CREAT SERPL-MCNC: 0.68 MG/DL — SIGNIFICANT CHANGE UP (ref 0.5–1.3)
CREAT SERPL-MCNC: 0.7 MG/DL — SIGNIFICANT CHANGE UP (ref 0.5–1.3)
DIFF PNL FLD: NEGATIVE — SIGNIFICANT CHANGE UP
EOSINOPHIL # BLD AUTO: 0.21 K/UL — SIGNIFICANT CHANGE UP (ref 0–0.5)
EOSINOPHIL NFR BLD AUTO: 1.6 % — SIGNIFICANT CHANGE UP (ref 0–6)
GLUCOSE SERPL-MCNC: 85 MG/DL — SIGNIFICANT CHANGE UP (ref 70–99)
GLUCOSE SERPL-MCNC: 90 MG/DL — SIGNIFICANT CHANGE UP (ref 70–99)
GLUCOSE UR QL: NEGATIVE — SIGNIFICANT CHANGE UP
HCT VFR BLD CALC: 32.9 % — LOW (ref 34.5–45)
HGB BLD-MCNC: 11.1 G/DL — LOW (ref 11.5–15.5)
IMM GRANULOCYTES NFR BLD AUTO: 0.3 % — SIGNIFICANT CHANGE UP (ref 0–1.5)
KETONES UR-MCNC: NEGATIVE — SIGNIFICANT CHANGE UP
LACTATE SERPL-SCNC: 1.3 MMOL/L — SIGNIFICANT CHANGE UP (ref 0.5–2)
LACTATE SERPL-SCNC: 2.2 MMOL/L — HIGH (ref 0.5–2)
LEUKOCYTE ESTERASE UR-ACNC: NEGATIVE — SIGNIFICANT CHANGE UP
LIDOCAIN IGE QN: 35 U/L — SIGNIFICANT CHANGE UP (ref 7–60)
LYMPHOCYTES # BLD AUTO: 1.46 K/UL — SIGNIFICANT CHANGE UP (ref 1–3.3)
LYMPHOCYTES # BLD AUTO: 11 % — LOW (ref 13–44)
MCHC RBC-ENTMCNC: 24.7 PG — LOW (ref 27–34)
MCHC RBC-ENTMCNC: 33.7 GM/DL — SIGNIFICANT CHANGE UP (ref 32–36)
MCV RBC AUTO: 73.3 FL — LOW (ref 80–100)
MONOCYTES # BLD AUTO: 0.48 K/UL — SIGNIFICANT CHANGE UP (ref 0–0.9)
MONOCYTES NFR BLD AUTO: 3.6 % — SIGNIFICANT CHANGE UP (ref 2–14)
NEUTROPHILS # BLD AUTO: 11.08 K/UL — HIGH (ref 1.8–7.4)
NEUTROPHILS NFR BLD AUTO: 83.2 % — HIGH (ref 43–77)
NITRITE UR-MCNC: NEGATIVE — SIGNIFICANT CHANGE UP
NRBC # BLD: 0 /100 WBCS — SIGNIFICANT CHANGE UP (ref 0–0)
PH UR: 6.5 — SIGNIFICANT CHANGE UP (ref 5–8)
PLATELET # BLD AUTO: 169 K/UL — SIGNIFICANT CHANGE UP (ref 150–400)
POTASSIUM SERPL-MCNC: 3.7 MMOL/L — SIGNIFICANT CHANGE UP (ref 3.5–5.3)
POTASSIUM SERPL-MCNC: SIGNIFICANT CHANGE UP MMOL/L (ref 3.5–5.3)
POTASSIUM SERPL-SCNC: 3.7 MMOL/L — SIGNIFICANT CHANGE UP (ref 3.5–5.3)
POTASSIUM SERPL-SCNC: SIGNIFICANT CHANGE UP MMOL/L (ref 3.5–5.3)
PROT SERPL-MCNC: 7.1 G/DL — SIGNIFICANT CHANGE UP (ref 6–8.3)
PROT SERPL-MCNC: 7.8 G/DL — SIGNIFICANT CHANGE UP (ref 6–8.3)
PROT UR-MCNC: NEGATIVE MG/DL — SIGNIFICANT CHANGE UP
RBC # BLD: 4.49 M/UL — SIGNIFICANT CHANGE UP (ref 3.8–5.2)
RBC # FLD: 15.9 % — HIGH (ref 10.3–14.5)
SODIUM SERPL-SCNC: 137 MMOL/L — SIGNIFICANT CHANGE UP (ref 135–145)
SODIUM SERPL-SCNC: 141 MMOL/L — SIGNIFICANT CHANGE UP (ref 135–145)
SP GR SPEC: <=1.005 — SIGNIFICANT CHANGE UP (ref 1–1.03)
UROBILINOGEN FLD QL: 0.2 E.U./DL — SIGNIFICANT CHANGE UP
WBC # BLD: 13.31 K/UL — HIGH (ref 3.8–10.5)
WBC # FLD AUTO: 13.31 K/UL — HIGH (ref 3.8–10.5)

## 2019-08-06 PROCEDURE — 99284 EMERGENCY DEPT VISIT MOD MDM: CPT | Mod: 25

## 2019-08-06 PROCEDURE — 83690 ASSAY OF LIPASE: CPT

## 2019-08-06 PROCEDURE — 74176 CT ABD & PELVIS W/O CONTRAST: CPT | Mod: 26

## 2019-08-06 PROCEDURE — 99284 EMERGENCY DEPT VISIT MOD MDM: CPT

## 2019-08-06 PROCEDURE — 81003 URINALYSIS AUTO W/O SCOPE: CPT

## 2019-08-06 PROCEDURE — 87086 URINE CULTURE/COLONY COUNT: CPT

## 2019-08-06 PROCEDURE — 36415 COLL VENOUS BLD VENIPUNCTURE: CPT

## 2019-08-06 PROCEDURE — 85025 COMPLETE CBC W/AUTO DIFF WBC: CPT

## 2019-08-06 PROCEDURE — 83605 ASSAY OF LACTIC ACID: CPT

## 2019-08-06 PROCEDURE — 74176 CT ABD & PELVIS W/O CONTRAST: CPT

## 2019-08-06 PROCEDURE — 80053 COMPREHEN METABOLIC PANEL: CPT

## 2019-08-06 PROCEDURE — 96374 THER/PROPH/DIAG INJ IV PUSH: CPT

## 2019-08-06 RX ORDER — SODIUM CHLORIDE 9 MG/ML
500 INJECTION INTRAMUSCULAR; INTRAVENOUS; SUBCUTANEOUS ONCE
Refills: 0 | Status: COMPLETED | OUTPATIENT
Start: 2019-08-06 | End: 2019-08-06

## 2019-08-06 RX ORDER — PHENAZOPYRIDINE HCL 100 MG
2 TABLET ORAL
Qty: 12 | Refills: 0
Start: 2019-08-06 | End: 2019-08-07

## 2019-08-06 RX ORDER — ACETAMINOPHEN 500 MG
650 TABLET ORAL ONCE
Refills: 0 | Status: COMPLETED | OUTPATIENT
Start: 2019-08-06 | End: 2019-08-06

## 2019-08-06 RX ORDER — FAMOTIDINE 10 MG/ML
20 INJECTION INTRAVENOUS ONCE
Refills: 0 | Status: COMPLETED | OUTPATIENT
Start: 2019-08-06 | End: 2019-08-06

## 2019-08-06 RX ORDER — SODIUM CHLORIDE 9 MG/ML
1000 INJECTION INTRAMUSCULAR; INTRAVENOUS; SUBCUTANEOUS ONCE
Refills: 0 | Status: COMPLETED | OUTPATIENT
Start: 2019-08-06 | End: 2019-08-06

## 2019-08-06 RX ADMIN — Medication 100 MILLIGRAM(S): at 17:35

## 2019-08-06 RX ADMIN — FAMOTIDINE 20 MILLIGRAM(S): 10 INJECTION INTRAVENOUS at 14:41

## 2019-08-06 RX ADMIN — SODIUM CHLORIDE 1500 MILLILITER(S): 9 INJECTION INTRAMUSCULAR; INTRAVENOUS; SUBCUTANEOUS at 13:15

## 2019-08-06 RX ADMIN — Medication 650 MILLIGRAM(S): at 17:35

## 2019-08-06 RX ADMIN — SODIUM CHLORIDE 750 MILLILITER(S): 9 INJECTION INTRAMUSCULAR; INTRAVENOUS; SUBCUTANEOUS at 14:42

## 2019-08-06 NOTE — ED ADULT TRIAGE NOTE - CHIEF COMPLAINT QUOTE
patient c/o abdominal pain , burning in urination , urinary frequency , nausea , headache and generalized weakness for 1 week . Denies any hematuria ., fever nor chills .

## 2019-08-06 NOTE — ED PROVIDER NOTE - ATTENDING CONTRIBUTION TO CARE
89 y/o F with PMHx of HTN, CAD, prior PE, GERD and a pacemaker 2 years ago s/p being recently discharged from [facility] on Friday presents to ED due to persistent urgency and dysuria as well as suprapubic discomfort. Also claims to feel weak and dehydrated, denies CP, SOB, melena, hematuria. Today had nml BM. Claims no Abx given upon discharge.    Agree with HPI, PE, and workup as per PA    Pt in nad  No acute findings on ct  u/a neg for infection  Treated with pyridium for urinary symptoms  Pt aware to follow up with pmd regarding further testing

## 2019-08-06 NOTE — ED PROVIDER NOTE - SKIN, MLM
Patient tolerating oral liquids without difficulty. No apparent s&s of distress noted at this time, no complaints voiced at this time. Injection site free from redness and drainage. Discharge instructions reviewed with patient/family/friend with good verbal feedback received. Patient ready for discharge  
VSS, all questions answered. Denies recent fever or illness. Pt states ready for procedure.  
Skin normal color for race, warm, dry and intact. No evidence of rash.

## 2019-08-06 NOTE — ED PROVIDER NOTE - OBJECTIVE STATEMENT
89 y/o F with PMHx of HTN, CAD, prior PE, GERD and a pacemaker 2 years ago s/p being recently discharged from [facility] on Friday presents to ED due to persistent urgency and dysuria as well as suprapubic discomfort. Also claims to feel weak and dehydrated, denies CP, SOB, melena, hematuria. Today had nml BM. Claims no Abx given upon discharge.

## 2019-08-06 NOTE — ED PROVIDER NOTE - NSFOLLOWUPINSTRUCTIONS_ED_ALL_ED_FT
Log Out.    [employer] CareNotes®     :  [facility]             DYSURIA - AfterCare(R) Instructions(ER/ED)     Dysuria    WHAT YOU NEED TO KNOW:    Dysuria is difficulty urinating, or pain, burning, or discomfort with urination. Dysuria is usually a symptom of another problem.     DISCHARGE INSTRUCTIONS:    Return to the emergency department if:     You have severe back, side, or abdominal pain.       You have fever and shaking chills.       You vomit several times in a row.     Contact your healthcare provider if:     Your symptoms do not go away, even after treatment.       You have questions or concerns about your condition or care.     Medicines:     Medicines may be given to help treat a bacterial infection or help decrease bladder spasms.      Take your medicine as directed. Contact your healthcare provider if you think your medicine is not helping or if you have side effects. Tell him of her if you are allergic to any medicine. Keep a list of the medicines, vitamins, and herbs you take. Include the amounts, and when and why you take them. Bring the list or the pill bottles to follow-up visits. Carry your medicine list with you in case of an emergency.    Follow up with your healthcare provider as directed: Your healthcare provider may also refer you to a urologist or nephrologist to have additional testing. Write down your questions so you remember to ask them during your visits.     Manage your dysuria:     Drink more liquids. Liquids help flush out bacteria that may be causing an infection. Ask your healthcare provider how much liquid to drink each day and which liquids are best for you.       Take sitz baths as directed. Fill a bathtub with 4 to 6 inches of warm water. You may also use a sitz bath pan that fits over a toilet. Sit in the sitz bath for 20 minutes. Do this 2 to 3 times a day, or as directed. The warm water can help decrease pain and swelling.          © Copyright [employer] 2019 All illustrations and images included in CareNotes are the copyrighted property of [employer] or [employer].      back to top                      © Copyright [employer] 2019       Recommend to follow up with PMD / gyn.

## 2019-08-12 DIAGNOSIS — Z91.041 RADIOGRAPHIC DYE ALLERGY STATUS: ICD-10-CM

## 2019-08-12 DIAGNOSIS — I10 ESSENTIAL (PRIMARY) HYPERTENSION: ICD-10-CM

## 2019-08-12 DIAGNOSIS — D64.9 ANEMIA, UNSPECIFIED: ICD-10-CM

## 2019-08-12 DIAGNOSIS — T36.1X5A ADVERSE EFFECT OF CEPHALOSPORINS AND OTHER BETA-LACTAM ANTIBIOTICS, INITIAL ENCOUNTER: ICD-10-CM

## 2019-08-12 DIAGNOSIS — L29.8 OTHER PRURITUS: ICD-10-CM

## 2019-08-12 DIAGNOSIS — I16.0 HYPERTENSIVE URGENCY: ICD-10-CM

## 2019-08-12 DIAGNOSIS — Z86.711 PERSONAL HISTORY OF PULMONARY EMBOLISM: ICD-10-CM

## 2019-08-12 DIAGNOSIS — E86.9 VOLUME DEPLETION, UNSPECIFIED: ICD-10-CM

## 2019-08-12 DIAGNOSIS — I25.10 ATHEROSCLEROTIC HEART DISEASE OF NATIVE CORONARY ARTERY WITHOUT ANGINA PECTORIS: ICD-10-CM

## 2019-08-12 DIAGNOSIS — K21.9 GASTRO-ESOPHAGEAL REFLUX DISEASE WITHOUT ESOPHAGITIS: ICD-10-CM

## 2019-08-12 DIAGNOSIS — Z95.0 PRESENCE OF CARDIAC PACEMAKER: ICD-10-CM

## 2019-11-12 ENCOUNTER — APPOINTMENT (OUTPATIENT)
Dept: OPHTHALMOLOGY | Facility: CLINIC | Age: 84
End: 2019-11-12

## 2019-12-10 ENCOUNTER — EMERGENCY (EMERGENCY)
Facility: HOSPITAL | Age: 84
LOS: 1 days | Discharge: HOME | End: 2019-12-10
Attending: EMERGENCY MEDICINE | Admitting: EMERGENCY MEDICINE
Payer: MEDICARE

## 2019-12-10 VITALS
OXYGEN SATURATION: 99 % | TEMPERATURE: 98 F | SYSTOLIC BLOOD PRESSURE: 164 MMHG | RESPIRATION RATE: 18 BRPM | DIASTOLIC BLOOD PRESSURE: 89 MMHG | WEIGHT: 156.09 LBS | HEART RATE: 70 BPM

## 2019-12-10 DIAGNOSIS — Z95.0 PRESENCE OF CARDIAC PACEMAKER: Chronic | ICD-10-CM

## 2019-12-10 LAB
ALBUMIN SERPL ELPH-MCNC: 4 G/DL — SIGNIFICANT CHANGE UP (ref 3.3–5)
ALP SERPL-CCNC: 74 U/L — SIGNIFICANT CHANGE UP (ref 40–120)
ALT FLD-CCNC: 8 U/L — LOW (ref 10–45)
ANION GAP SERPL CALC-SCNC: 10 MMOL/L — SIGNIFICANT CHANGE UP (ref 5–17)
APTT BLD: 33.1 SEC — SIGNIFICANT CHANGE UP (ref 27.5–36.3)
AST SERPL-CCNC: 18 U/L — SIGNIFICANT CHANGE UP (ref 10–40)
BASOPHILS # BLD AUTO: 0.01 K/UL — SIGNIFICANT CHANGE UP (ref 0–0.2)
BASOPHILS NFR BLD AUTO: 0.2 % — SIGNIFICANT CHANGE UP (ref 0–2)
BILIRUB SERPL-MCNC: 0.3 MG/DL — SIGNIFICANT CHANGE UP (ref 0.2–1.2)
BUN SERPL-MCNC: 14 MG/DL — SIGNIFICANT CHANGE UP (ref 7–23)
CALCIUM SERPL-MCNC: 9.7 MG/DL — SIGNIFICANT CHANGE UP (ref 8.4–10.5)
CHLORIDE SERPL-SCNC: 99 MMOL/L — SIGNIFICANT CHANGE UP (ref 96–108)
CO2 SERPL-SCNC: 28 MMOL/L — SIGNIFICANT CHANGE UP (ref 22–31)
CREAT SERPL-MCNC: 0.84 MG/DL — SIGNIFICANT CHANGE UP (ref 0.5–1.3)
EOSINOPHIL # BLD AUTO: 0.15 K/UL — SIGNIFICANT CHANGE UP (ref 0–0.5)
EOSINOPHIL NFR BLD AUTO: 2.9 % — SIGNIFICANT CHANGE UP (ref 0–6)
GLUCOSE SERPL-MCNC: 108 MG/DL — HIGH (ref 70–99)
HCT VFR BLD CALC: 35.5 % — SIGNIFICANT CHANGE UP (ref 34.5–45)
HGB BLD-MCNC: 11.8 G/DL — SIGNIFICANT CHANGE UP (ref 11.5–15.5)
IMM GRANULOCYTES NFR BLD AUTO: 0.2 % — SIGNIFICANT CHANGE UP (ref 0–1.5)
INR BLD: 1.01 — SIGNIFICANT CHANGE UP (ref 0.88–1.16)
LIDOCAIN IGE QN: 27 U/L — SIGNIFICANT CHANGE UP (ref 7–60)
LYMPHOCYTES # BLD AUTO: 1.9 K/UL — SIGNIFICANT CHANGE UP (ref 1–3.3)
LYMPHOCYTES # BLD AUTO: 36.5 % — SIGNIFICANT CHANGE UP (ref 13–44)
MCHC RBC-ENTMCNC: 24.2 PG — LOW (ref 27–34)
MCHC RBC-ENTMCNC: 33.2 GM/DL — SIGNIFICANT CHANGE UP (ref 32–36)
MCV RBC AUTO: 72.7 FL — LOW (ref 80–100)
MONOCYTES # BLD AUTO: 0.39 K/UL — SIGNIFICANT CHANGE UP (ref 0–0.9)
MONOCYTES NFR BLD AUTO: 7.5 % — SIGNIFICANT CHANGE UP (ref 2–14)
NEUTROPHILS # BLD AUTO: 2.75 K/UL — SIGNIFICANT CHANGE UP (ref 1.8–7.4)
NEUTROPHILS NFR BLD AUTO: 52.7 % — SIGNIFICANT CHANGE UP (ref 43–77)
NRBC # BLD: 0 /100 WBCS — SIGNIFICANT CHANGE UP (ref 0–0)
PLATELET # BLD AUTO: 177 K/UL — SIGNIFICANT CHANGE UP (ref 150–400)
POTASSIUM SERPL-MCNC: 4.4 MMOL/L — SIGNIFICANT CHANGE UP (ref 3.5–5.3)
POTASSIUM SERPL-SCNC: 4.4 MMOL/L — SIGNIFICANT CHANGE UP (ref 3.5–5.3)
PROT SERPL-MCNC: 7.2 G/DL — SIGNIFICANT CHANGE UP (ref 6–8.3)
PROTHROM AB SERPL-ACNC: 11.4 SEC — SIGNIFICANT CHANGE UP (ref 10–12.9)
RBC # BLD: 4.88 M/UL — SIGNIFICANT CHANGE UP (ref 3.8–5.2)
RBC # FLD: 16.1 % — HIGH (ref 10.3–14.5)
SODIUM SERPL-SCNC: 137 MMOL/L — SIGNIFICANT CHANGE UP (ref 135–145)
TROPONIN T SERPL-MCNC: <0.01 NG/ML — SIGNIFICANT CHANGE UP (ref 0–0.01)
WBC # BLD: 5.21 K/UL — SIGNIFICANT CHANGE UP (ref 3.8–10.5)
WBC # FLD AUTO: 5.21 K/UL — SIGNIFICANT CHANGE UP (ref 3.8–10.5)

## 2019-12-10 PROCEDURE — 93010 ELECTROCARDIOGRAM REPORT: CPT

## 2019-12-10 PROCEDURE — 99284 EMERGENCY DEPT VISIT MOD MDM: CPT

## 2019-12-10 NOTE — ED ADULT NURSE NOTE - OBJECTIVE STATEMENT
pt. presents with c/o epigastric pain that has been getting worse in the past 2 weeks, pt. states she gets pain after she eats anything, before she could control it with diet, pt. reports hx of GERD, denies any difficulty breathing, n/v/d, blood in stool, pt. states she has been getting lower leg cramps for about 2 weeks as well. pt. also reports burning on urination since last week.

## 2019-12-11 ENCOUNTER — EMERGENCY (EMERGENCY)
Facility: HOSPITAL | Age: 84
LOS: 1 days | Discharge: HOME | End: 2019-12-11
Attending: EMERGENCY MEDICINE | Admitting: EMERGENCY MEDICINE
Payer: MEDICARE

## 2019-12-11 VITALS
TEMPERATURE: 98 F | RESPIRATION RATE: 18 BRPM | HEART RATE: 94 BPM | SYSTOLIC BLOOD PRESSURE: 137 MMHG | DIASTOLIC BLOOD PRESSURE: 74 MMHG | OXYGEN SATURATION: 99 %

## 2019-12-11 VITALS
WEIGHT: 149.91 LBS | SYSTOLIC BLOOD PRESSURE: 178 MMHG | TEMPERATURE: 97 F | RESPIRATION RATE: 18 BRPM | DIASTOLIC BLOOD PRESSURE: 94 MMHG | HEART RATE: 77 BPM | OXYGEN SATURATION: 99 %

## 2019-12-11 VITALS
TEMPERATURE: 98 F | DIASTOLIC BLOOD PRESSURE: 78 MMHG | OXYGEN SATURATION: 97 % | RESPIRATION RATE: 18 BRPM | SYSTOLIC BLOOD PRESSURE: 158 MMHG | HEART RATE: 70 BPM

## 2019-12-11 DIAGNOSIS — Z95.0 PRESENCE OF CARDIAC PACEMAKER: Chronic | ICD-10-CM

## 2019-12-11 LAB
APPEARANCE UR: CLEAR — SIGNIFICANT CHANGE UP
BILIRUB UR-MCNC: NEGATIVE — SIGNIFICANT CHANGE UP
COLOR SPEC: YELLOW — SIGNIFICANT CHANGE UP
DIFF PNL FLD: NEGATIVE — SIGNIFICANT CHANGE UP
GLUCOSE UR QL: NEGATIVE — SIGNIFICANT CHANGE UP
KETONES UR-MCNC: NEGATIVE — SIGNIFICANT CHANGE UP
LEUKOCYTE ESTERASE UR-ACNC: ABNORMAL
NITRITE UR-MCNC: NEGATIVE — SIGNIFICANT CHANGE UP
PH UR: 7 — SIGNIFICANT CHANGE UP (ref 5–8)
PROT UR-MCNC: NEGATIVE MG/DL — SIGNIFICANT CHANGE UP
SP GR SPEC: 1.01 — SIGNIFICANT CHANGE UP (ref 1–1.03)
UROBILINOGEN FLD QL: 0.2 E.U./DL — SIGNIFICANT CHANGE UP

## 2019-12-11 PROCEDURE — 71045 X-RAY EXAM CHEST 1 VIEW: CPT | Mod: 26

## 2019-12-11 PROCEDURE — 96376 TX/PRO/DX INJ SAME DRUG ADON: CPT

## 2019-12-11 PROCEDURE — 99284 EMERGENCY DEPT VISIT MOD MDM: CPT

## 2019-12-11 PROCEDURE — 87086 URINE CULTURE/COLONY COUNT: CPT

## 2019-12-11 PROCEDURE — 73522 X-RAY EXAM HIPS BI 3-4 VIEWS: CPT | Mod: 26

## 2019-12-11 PROCEDURE — 93010 ELECTROCARDIOGRAM REPORT: CPT

## 2019-12-11 PROCEDURE — 73562 X-RAY EXAM OF KNEE 3: CPT | Mod: 26,RT

## 2019-12-11 PROCEDURE — 73522 X-RAY EXAM HIPS BI 3-4 VIEWS: CPT

## 2019-12-11 PROCEDURE — 93005 ELECTROCARDIOGRAM TRACING: CPT

## 2019-12-11 PROCEDURE — 83690 ASSAY OF LIPASE: CPT

## 2019-12-11 PROCEDURE — 36415 COLL VENOUS BLD VENIPUNCTURE: CPT

## 2019-12-11 PROCEDURE — 80053 COMPREHEN METABOLIC PANEL: CPT

## 2019-12-11 PROCEDURE — 71045 X-RAY EXAM CHEST 1 VIEW: CPT

## 2019-12-11 PROCEDURE — 96375 TX/PRO/DX INJ NEW DRUG ADDON: CPT

## 2019-12-11 PROCEDURE — 85025 COMPLETE CBC W/AUTO DIFF WBC: CPT

## 2019-12-11 PROCEDURE — 85610 PROTHROMBIN TIME: CPT

## 2019-12-11 PROCEDURE — 96374 THER/PROPH/DIAG INJ IV PUSH: CPT

## 2019-12-11 PROCEDURE — 85730 THROMBOPLASTIN TIME PARTIAL: CPT

## 2019-12-11 PROCEDURE — 99284 EMERGENCY DEPT VISIT MOD MDM: CPT | Mod: 25

## 2019-12-11 PROCEDURE — 81001 URINALYSIS AUTO W/SCOPE: CPT

## 2019-12-11 PROCEDURE — 84484 ASSAY OF TROPONIN QUANT: CPT

## 2019-12-11 PROCEDURE — 73562 X-RAY EXAM OF KNEE 3: CPT

## 2019-12-11 RX ORDER — PANTOPRAZOLE SODIUM 20 MG/1
40 TABLET, DELAYED RELEASE ORAL ONCE
Refills: 0 | Status: COMPLETED | OUTPATIENT
Start: 2019-12-11 | End: 2019-12-11

## 2019-12-11 RX ORDER — FAMOTIDINE 10 MG/ML
20 INJECTION INTRAVENOUS ONCE
Refills: 0 | Status: COMPLETED | OUTPATIENT
Start: 2019-12-11 | End: 2019-12-11

## 2019-12-11 RX ORDER — MORPHINE SULFATE 50 MG/1
1 CAPSULE, EXTENDED RELEASE ORAL ONCE
Refills: 0 | Status: DISCONTINUED | OUTPATIENT
Start: 2019-12-11 | End: 2019-12-11

## 2019-12-11 RX ORDER — ONDANSETRON 8 MG/1
4 TABLET, FILM COATED ORAL ONCE
Refills: 0 | Status: COMPLETED | OUTPATIENT
Start: 2019-12-11 | End: 2019-12-11

## 2019-12-11 RX ORDER — LIDOCAINE 4 G/100G
10 CREAM TOPICAL ONCE
Refills: 0 | Status: COMPLETED | OUTPATIENT
Start: 2019-12-11 | End: 2019-12-11

## 2019-12-11 RX ORDER — SODIUM CHLORIDE 9 MG/ML
1000 INJECTION INTRAMUSCULAR; INTRAVENOUS; SUBCUTANEOUS ONCE
Refills: 0 | Status: COMPLETED | OUTPATIENT
Start: 2019-12-11 | End: 2019-12-11

## 2019-12-11 RX ORDER — ACETAMINOPHEN 500 MG
1000 TABLET ORAL ONCE
Refills: 0 | Status: COMPLETED | OUTPATIENT
Start: 2019-12-11 | End: 2019-12-11

## 2019-12-11 RX ADMIN — FAMOTIDINE 20 MILLIGRAM(S): 10 INJECTION INTRAVENOUS at 01:40

## 2019-12-11 RX ADMIN — Medication 30 MILLILITER(S): at 09:58

## 2019-12-11 RX ADMIN — Medication 30 MILLILITER(S): at 01:40

## 2019-12-11 RX ADMIN — FAMOTIDINE 20 MILLIGRAM(S): 10 INJECTION INTRAVENOUS at 07:40

## 2019-12-11 RX ADMIN — LIDOCAINE 10 MILLILITER(S): 4 CREAM TOPICAL at 07:40

## 2019-12-11 RX ADMIN — PANTOPRAZOLE SODIUM 40 MILLIGRAM(S): 20 TABLET, DELAYED RELEASE ORAL at 09:58

## 2019-12-11 RX ADMIN — SODIUM CHLORIDE 1000 MILLILITER(S): 9 INJECTION INTRAMUSCULAR; INTRAVENOUS; SUBCUTANEOUS at 01:26

## 2019-12-11 RX ADMIN — Medication 30 MILLILITER(S): at 07:40

## 2019-12-11 RX ADMIN — Medication 1000 MILLIGRAM(S): at 01:27

## 2019-12-11 RX ADMIN — ONDANSETRON 4 MILLIGRAM(S): 8 TABLET, FILM COATED ORAL at 01:26

## 2019-12-11 NOTE — ED PROVIDER NOTE - OBJECTIVE STATEMENT
89F cad, av block s/p ppm (2017), htn, gerd/gastritis on dexilant, pe (>40y ago), s/p total hysterectomy, previously hospitalized for hypertensive urgency (7/31/19-8/1/19), c/o 1w recurrent burning epigastric abd pain camron at nighttime and w/ eating x1w. no fever/chills, no cp/sob, no uri/cough, no n/v, no diarrhea, no hematochezia/melena, no dysuria. reportedly already sees a GI doctor for similar sx but wants a new referral. last endoscopy 6m ago w/ gerd/gastritis.

## 2019-12-11 NOTE — ED PROVIDER NOTE - PATIENT PORTAL LINK FT
You can access the FollowMyHealth Patient Portal offered by [facility] by registering at the following website: [email or web address]. By joining [employer]’s FollowMyHealth portal, you will also be able to view your health information using other applications (apps) compatible with our system.

## 2019-12-11 NOTE — ED PROVIDER NOTE - PROGRESS NOTE DETAILS
pt reports resolved sx s/p gi cocktail/tylenol. requesting to go home but given the time reports no one awake to receive her back home. requesting to stay in ED until AM. Arnulfoel - patient slept overnight in ED. c/o recurrent acid reflux symptoms consistent with her past; says meds helped last night and requesting another dose before going home.  Abdomen is soft, nontender.  She denies any chest pain or discomfort, SOB, or nausea. Patient states her acid reflux completely resolved with the GI meds and wants to go home now.  She says she will follow up with Dr Thurman, to whom she was referred upon discharge last night.
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
## 2019-12-11 NOTE — ED PROVIDER NOTE - PROGRESS NOTE ADDITIONAL1
05/26/19 0423   NIV Type   $NIV $Daily Charge   Equipment Type V60   Mode BIPAP   Mask Type Full face mask   Mask Size Medium   Settings/Measurements   Comfort Level Good   Using Accessory Muscles No   IPAP 12 cmH20   EPAP 6 cmH2O   Resp 19   FiO2  30 %   Vt Exhaled 380 mL   Mask Leak (lpm) 0 lpm   Skin Protection for O2 Device Yes Additional Progress Note...

## 2019-12-11 NOTE — ED PROVIDER NOTE - PHYSICAL EXAMINATION
CONST: nontoxic NAD speaking in full sentences  HEAD: atraumatic  EYES: conjunctivae clear  ENT: mmm  NECK: supple  CARD: rrr no murmurs  CHEST: ctab no r/r/w, no stridor/retractions/tripoding  ABD: soft, nd, nttp, no rebound/guarding, no cvat  EXT: FROM, symmetric distal pulses intact  SKIN: warm, dry, no rash, no pedal edema, cap refill <2sec  NEURO: a+ox3, 5/5 strength x4, gross sensation intact x4, normal gait

## 2019-12-11 NOTE — ED PROVIDER NOTE - PATIENT PORTAL LINK FT
You can access the FollowMyHealth Patient Portal offered by [facility] by registering at the following website: [email or web address]. By joining [employer]’s FollowMyHealth portal, you will also be able to view your health information using other applications (apps) compatible with our system.

## 2019-12-11 NOTE — ED PROVIDER NOTE - CARE PROVIDER_API CALL
Mundo Thurman)  [facility]  [address], [address]  [place] [address]  Phone: [phone]  Fax: [phone]  Follow Up Time: 1-3 Days

## 2019-12-11 NOTE — ED PROVIDER NOTE - MUSCULOSKELETAL, MLM
C-spine clear. Neck supple. No midline CTL-spine tenderness. No chest wall tenderness. Stable pelvis. Joints FROM with exception of right knee, with mild limitation with flexion. Palpable diffuse edema (pt reports chronic right kene swelling, however slightly worse). Tender at inferior medial aspect of knee. Stable ligaments. Pulses intact.
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
## 2019-12-11 NOTE — ED PROVIDER NOTE - CONSTITUTIONAL, MLM
normal... Appears comfortable at rest. Appears somewhat uncomfortable when trying to walk, reporting right knee soreness.
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
## 2019-12-11 NOTE — ED PROVIDER NOTE - CLINICAL SUMMARY MEDICAL DECISION MAKING FREE TEXT BOX
avss. nontoxic. NAD. no acute surgical abd. no e/o sepsis. LFTs/lipase wnl. trop neg. ekg w/o acute abnl. sx similar to prior gastritis. already w/ GI doctor but requesting new referral including new family doctor referral. already on PPI. pain controlled s/p gi cocktail. tolerating po.    dispo: pending family to pick her up in AM

## 2019-12-11 NOTE — ED ADULT NURSE NOTE - CHPI ED NUR SYMPTOMS NEG
no headache/no back pain/no laceration/no bruising/no disorientation/no dizziness/no loss of consciousness
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
## 2019-12-11 NOTE — ED PROVIDER NOTE - CARE PROVIDER_API CALL
Marck Adkins)  Orthopaedic Surgery  [address]  [place], [place] [address]  Phone: [phone]  Fax: [phone]  Follow Up Time:

## 2019-12-12 LAB
CULTURE RESULTS: SIGNIFICANT CHANGE UP
SPECIMEN SOURCE: SIGNIFICANT CHANGE UP

## 2019-12-16 DIAGNOSIS — I10 ESSENTIAL (PRIMARY) HYPERTENSION: ICD-10-CM

## 2019-12-16 DIAGNOSIS — Z91.041 RADIOGRAPHIC DYE ALLERGY STATUS: ICD-10-CM

## 2019-12-16 DIAGNOSIS — Z79.02 LONG TERM (CURRENT) USE OF ANTITHROMBOTICS/ANTIPLATELETS: ICD-10-CM

## 2019-12-16 DIAGNOSIS — Z79.899 OTHER LONG TERM (CURRENT) DRUG THERAPY: ICD-10-CM
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
## 2019-12-16 DIAGNOSIS — S80.01XA CONTUSION OF RIGHT KNEE, INITIAL ENCOUNTER: ICD-10-CM

## 2019-12-16 DIAGNOSIS — I25.10 ATHEROSCLEROTIC HEART DISEASE OF NATIVE CORONARY ARTERY WITHOUT ANGINA PECTORIS: ICD-10-CM

## 2019-12-16 DIAGNOSIS — R10.13 EPIGASTRIC PAIN: ICD-10-CM

## 2019-12-16 DIAGNOSIS — Z90.710 ACQUIRED ABSENCE OF BOTH CERVIX AND UTERUS: ICD-10-CM
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
## 2019-12-16 DIAGNOSIS — M25.561 PAIN IN RIGHT KNEE: ICD-10-CM

## 2020-01-10 ENCOUNTER — APPOINTMENT (OUTPATIENT)
Dept: NEUROLOGY | Facility: CLINIC | Age: 85
End: 2020-01-10
Payer: MEDICARE

## 2020-01-10 VITALS
HEIGHT: 65.5 IN | WEIGHT: 148 LBS | DIASTOLIC BLOOD PRESSURE: 75 MMHG | SYSTOLIC BLOOD PRESSURE: 158 MMHG | OXYGEN SATURATION: 99 % | BODY MASS INDEX: 24.36 KG/M2 | HEART RATE: 70 BPM

## 2020-01-10 DIAGNOSIS — Z82.49 FAMILY HISTORY OF ISCHEMIC HEART DISEASE AND OTHER DISEASES OF THE CIRCULATORY SYSTEM: ICD-10-CM

## 2020-01-10 DIAGNOSIS — M54.16 RADICULOPATHY, LUMBAR REGION: ICD-10-CM

## 2020-01-10 DIAGNOSIS — Z63.4 DISAPPEARANCE AND DEATH OF FAMILY MEMBER: ICD-10-CM

## 2020-01-10 DIAGNOSIS — G62.9 POLYNEUROPATHY, UNSPECIFIED: ICD-10-CM

## 2020-01-10 DIAGNOSIS — R42 DIZZINESS AND GIDDINESS: ICD-10-CM

## 2020-01-10 DIAGNOSIS — R27.0 ATAXIA, UNSPECIFIED: ICD-10-CM

## 2020-01-10 DIAGNOSIS — Z78.9 OTHER SPECIFIED HEALTH STATUS: ICD-10-CM

## 2020-01-10 DIAGNOSIS — Z83.3 FAMILY HISTORY OF DIABETES MELLITUS: ICD-10-CM

## 2020-01-10 PROCEDURE — 99203 OFFICE O/P NEW LOW 30 MIN: CPT

## 2020-01-10 SDOH — SOCIAL STABILITY - SOCIAL INSECURITY: DISSAPEARANCE AND DEATH OF FAMILY MEMBER: Z63.4

## 2020-01-10 NOTE — ASSESSMENT
[FreeTextEntry1] : This is a 89 through woman who reports problems referable to that of her lower extremities with numbness tingling paresthesias and difficulty walking she will proceed with vascular studies of the lower extremities MRI of the lumbar spine EMG studies and rehabilitation evaluation she was seen for followup her family was present during the visit.

## 2020-01-10 NOTE — PHYSICAL EXAM
[FreeTextEntry1] : Examination she is awake alert and appropriately aphasia or dysarthria cranial nerves show flexed trunk movements fundi transfers weren't obtained\par \par The gait is abnormal she is noted in the right tremor dysmetria detectable. Ankle jerks knee jerks are absent toes appear to be downgoing is also minimal swelling in her left leg as compared with the right no color or temperature changes he noted pulses appear to be intact.

## 2020-01-24 ENCOUNTER — INPATIENT (INPATIENT)
Facility: HOSPITAL | Age: 85
LOS: 2 days | Discharge: HOME | DRG: 392 | End: 2020-01-27
Attending: SURGERY | Admitting: SURGERY
Payer: MEDICARE

## 2020-01-24 VITALS
TEMPERATURE: 98 F | HEART RATE: 72 BPM | RESPIRATION RATE: 18 BRPM | DIASTOLIC BLOOD PRESSURE: 73 MMHG | SYSTOLIC BLOOD PRESSURE: 157 MMHG | OXYGEN SATURATION: 98 %

## 2020-01-24 DIAGNOSIS — Z95.0 PRESENCE OF CARDIAC PACEMAKER: Chronic | ICD-10-CM

## 2020-01-24 LAB
ALBUMIN SERPL ELPH-MCNC: 4.2 G/DL — SIGNIFICANT CHANGE UP (ref 3.3–5)
ALP SERPL-CCNC: 86 U/L — SIGNIFICANT CHANGE UP (ref 40–120)
ALT FLD-CCNC: 11 U/L — SIGNIFICANT CHANGE UP (ref 10–45)
ANION GAP SERPL CALC-SCNC: 14 MMOL/L — SIGNIFICANT CHANGE UP (ref 5–17)
ANISOCYTOSIS BLD QL: SLIGHT — SIGNIFICANT CHANGE UP
APPEARANCE UR: CLEAR — SIGNIFICANT CHANGE UP
APTT BLD: 27.7 SEC — SIGNIFICANT CHANGE UP (ref 27.5–36.3)
AST SERPL-CCNC: 19 U/L — SIGNIFICANT CHANGE UP (ref 10–40)
BASOPHILS # BLD AUTO: 0 K/UL — SIGNIFICANT CHANGE UP (ref 0–0.2)
BASOPHILS NFR BLD AUTO: 0 % — SIGNIFICANT CHANGE UP (ref 0–2)
BILIRUB SERPL-MCNC: 0.2 MG/DL — SIGNIFICANT CHANGE UP (ref 0.2–1.2)
BILIRUB UR-MCNC: NEGATIVE — SIGNIFICANT CHANGE UP
BUN SERPL-MCNC: 12 MG/DL — SIGNIFICANT CHANGE UP (ref 7–23)
CALCIUM SERPL-MCNC: 10.5 MG/DL — SIGNIFICANT CHANGE UP (ref 8.4–10.5)
CHLORIDE SERPL-SCNC: 99 MMOL/L — SIGNIFICANT CHANGE UP (ref 96–108)
CO2 SERPL-SCNC: 26 MMOL/L — SIGNIFICANT CHANGE UP (ref 22–31)
COLOR SPEC: YELLOW — SIGNIFICANT CHANGE UP
CREAT SERPL-MCNC: 0.97 MG/DL — SIGNIFICANT CHANGE UP (ref 0.5–1.3)
DIFF PNL FLD: NEGATIVE — SIGNIFICANT CHANGE UP
EOSINOPHIL # BLD AUTO: 0.08 K/UL — SIGNIFICANT CHANGE UP (ref 0–0.5)
EOSINOPHIL NFR BLD AUTO: 0.9 % — SIGNIFICANT CHANGE UP (ref 0–6)
GIANT PLATELETS BLD QL SMEAR: PRESENT — SIGNIFICANT CHANGE UP
GLUCOSE SERPL-MCNC: 129 MG/DL — HIGH (ref 70–99)
GLUCOSE UR QL: NEGATIVE — SIGNIFICANT CHANGE UP
HCT VFR BLD CALC: 37.6 % — SIGNIFICANT CHANGE UP (ref 34.5–45)
HGB BLD-MCNC: 12.4 G/DL — SIGNIFICANT CHANGE UP (ref 11.5–15.5)
INR BLD: 0.97 — SIGNIFICANT CHANGE UP (ref 0.88–1.16)
KETONES UR-MCNC: NEGATIVE — SIGNIFICANT CHANGE UP
LACTATE SERPL-SCNC: 1 MMOL/L — SIGNIFICANT CHANGE UP (ref 0.5–2)
LEUKOCYTE ESTERASE UR-ACNC: NEGATIVE — SIGNIFICANT CHANGE UP
LIDOCAIN IGE QN: 22 U/L — SIGNIFICANT CHANGE UP (ref 7–60)
LYMPHOCYTES # BLD AUTO: 1.08 K/UL — SIGNIFICANT CHANGE UP (ref 1–3.3)
LYMPHOCYTES # BLD AUTO: 11.6 % — LOW (ref 13–44)
MACROCYTES BLD QL: SLIGHT — SIGNIFICANT CHANGE UP
MANUAL SMEAR VERIFICATION: SIGNIFICANT CHANGE UP
MCHC RBC-ENTMCNC: 24.2 PG — LOW (ref 27–34)
MCHC RBC-ENTMCNC: 33 GM/DL — SIGNIFICANT CHANGE UP (ref 32–36)
MCV RBC AUTO: 73.3 FL — LOW (ref 80–100)
MICROCYTES BLD QL: SLIGHT — SIGNIFICANT CHANGE UP
MONOCYTES # BLD AUTO: 0.42 K/UL — SIGNIFICANT CHANGE UP (ref 0–0.9)
MONOCYTES NFR BLD AUTO: 4.5 % — SIGNIFICANT CHANGE UP (ref 2–14)
NEUTROPHILS # BLD AUTO: 7.42 K/UL — HIGH (ref 1.8–7.4)
NEUTROPHILS NFR BLD AUTO: 79.4 % — HIGH (ref 43–77)
NITRITE UR-MCNC: NEGATIVE — SIGNIFICANT CHANGE UP
PH UR: 7.5 — SIGNIFICANT CHANGE UP (ref 5–8)
PLAT MORPH BLD: ABNORMAL
PLATELET # BLD AUTO: 201 K/UL — SIGNIFICANT CHANGE UP (ref 150–400)
POTASSIUM SERPL-MCNC: 4.2 MMOL/L — SIGNIFICANT CHANGE UP (ref 3.5–5.3)
POTASSIUM SERPL-SCNC: 4.2 MMOL/L — SIGNIFICANT CHANGE UP (ref 3.5–5.3)
PROT SERPL-MCNC: 7.7 G/DL — SIGNIFICANT CHANGE UP (ref 6–8.3)
PROT UR-MCNC: NEGATIVE MG/DL — SIGNIFICANT CHANGE UP
PROTHROM AB SERPL-ACNC: 11 SEC — SIGNIFICANT CHANGE UP (ref 10–12.9)
RBC # BLD: 5.13 M/UL — SIGNIFICANT CHANGE UP (ref 3.8–5.2)
RBC # FLD: 16.7 % — HIGH (ref 10.3–14.5)
RBC BLD AUTO: ABNORMAL
SODIUM SERPL-SCNC: 139 MMOL/L — SIGNIFICANT CHANGE UP (ref 135–145)
SP GR SPEC: 1.01 — SIGNIFICANT CHANGE UP (ref 1–1.03)
SPHEROCYTES BLD QL SMEAR: SLIGHT — SIGNIFICANT CHANGE UP
TARGETS BLD QL SMEAR: SLIGHT — SIGNIFICANT CHANGE UP
UROBILINOGEN FLD QL: 0.2 E.U./DL — SIGNIFICANT CHANGE UP
VARIANT LYMPHS # BLD: 3.6 % — SIGNIFICANT CHANGE UP (ref 0–6)
WBC # BLD: 9.35 K/UL — SIGNIFICANT CHANGE UP (ref 3.8–10.5)
WBC # FLD AUTO: 9.35 K/UL — SIGNIFICANT CHANGE UP (ref 3.8–10.5)

## 2020-01-24 PROCEDURE — 74176 CT ABD & PELVIS W/O CONTRAST: CPT | Mod: 26

## 2020-01-24 PROCEDURE — 93010 ELECTROCARDIOGRAM REPORT: CPT

## 2020-01-24 PROCEDURE — 99285 EMERGENCY DEPT VISIT HI MDM: CPT

## 2020-01-24 RX ORDER — SODIUM CHLORIDE 9 MG/ML
1000 INJECTION INTRAMUSCULAR; INTRAVENOUS; SUBCUTANEOUS ONCE
Refills: 0 | Status: COMPLETED | OUTPATIENT
Start: 2020-01-24 | End: 2020-01-24

## 2020-01-24 RX ORDER — ENOXAPARIN SODIUM 100 MG/ML
40 INJECTION SUBCUTANEOUS DAILY
Refills: 0 | Status: DISCONTINUED | OUTPATIENT
Start: 2020-01-24 | End: 2020-01-27

## 2020-01-24 RX ORDER — ONDANSETRON 8 MG/1
4 TABLET, FILM COATED ORAL EVERY 6 HOURS
Refills: 0 | Status: DISCONTINUED | OUTPATIENT
Start: 2020-01-24 | End: 2020-01-27

## 2020-01-24 RX ORDER — DEXLANSOPRAZOLE 30 MG/1
1 CAPSULE, DELAYED RELEASE ORAL
Qty: 0 | Refills: 0 | DISCHARGE

## 2020-01-24 RX ORDER — CLOPIDOGREL BISULFATE 75 MG/1
1 TABLET, FILM COATED ORAL
Qty: 0 | Refills: 0 | DISCHARGE

## 2020-01-24 RX ORDER — ATORVASTATIN CALCIUM 80 MG/1
1 TABLET, FILM COATED ORAL
Qty: 0 | Refills: 0 | DISCHARGE

## 2020-01-24 RX ORDER — SODIUM CHLORIDE 9 MG/ML
1000 INJECTION INTRAMUSCULAR; INTRAVENOUS; SUBCUTANEOUS
Refills: 0 | Status: DISCONTINUED | OUTPATIENT
Start: 2020-01-24 | End: 2020-01-27

## 2020-01-24 RX ORDER — ONDANSETRON 8 MG/1
4 TABLET, FILM COATED ORAL ONCE
Refills: 0 | Status: COMPLETED | OUTPATIENT
Start: 2020-01-24 | End: 2020-01-24

## 2020-01-24 RX ORDER — HYDRALAZINE HCL 50 MG
10 TABLET ORAL ONCE
Refills: 0 | Status: COMPLETED | OUTPATIENT
Start: 2020-01-24 | End: 2020-01-24

## 2020-01-24 RX ORDER — ACETAMINOPHEN 500 MG
1000 TABLET ORAL ONCE
Refills: 0 | Status: COMPLETED | OUTPATIENT
Start: 2020-01-24 | End: 2020-01-24

## 2020-01-24 RX ORDER — FAMOTIDINE 10 MG/ML
20 INJECTION INTRAVENOUS ONCE
Refills: 0 | Status: COMPLETED | OUTPATIENT
Start: 2020-01-24 | End: 2020-01-24

## 2020-01-24 RX ORDER — ACETAMINOPHEN 500 MG
1000 TABLET ORAL ONCE
Refills: 0 | Status: COMPLETED | OUTPATIENT
Start: 2020-01-25 | End: 2020-01-25

## 2020-01-24 RX ADMIN — Medication 1000 MILLIGRAM(S): at 21:47

## 2020-01-24 RX ADMIN — Medication 400 MILLIGRAM(S): at 20:02

## 2020-01-24 RX ADMIN — SODIUM CHLORIDE 100 MILLILITER(S): 9 INJECTION INTRAMUSCULAR; INTRAVENOUS; SUBCUTANEOUS at 21:47

## 2020-01-24 RX ADMIN — ENOXAPARIN SODIUM 40 MILLIGRAM(S): 100 INJECTION SUBCUTANEOUS at 22:22

## 2020-01-24 RX ADMIN — SODIUM CHLORIDE 1000 MILLILITER(S): 9 INJECTION INTRAMUSCULAR; INTRAVENOUS; SUBCUTANEOUS at 14:54

## 2020-01-24 RX ADMIN — Medication 30 MILLILITER(S): at 15:06

## 2020-01-24 RX ADMIN — FAMOTIDINE 20 MILLIGRAM(S): 10 INJECTION INTRAVENOUS at 15:06

## 2020-01-24 RX ADMIN — ONDANSETRON 4 MILLIGRAM(S): 8 TABLET, FILM COATED ORAL at 14:54

## 2020-01-24 RX ADMIN — Medication 10 MILLIGRAM(S): at 22:22

## 2020-01-24 NOTE — ED ADULT NURSE NOTE - OBJECTIVE STATEMENT
pt received into HW spot A&Ox3 appears comfortable BIBA complaining of abd pain N/V and some diarrhea since yesterday after eating some steamed vegetables at home no one else is sick or ate the same foods. Describes pain as burning denies any blood in vomit or diarrhea. Pt ambulatory with assistance steady gait noted no head ache blurry vision lightheadedness dizziness weakness numbness/ tingling. Respirations appear even and unlabored sating at 99% on room air. abd soft nondistended 20G placed to LAC labs drawn and sent pt in NAD informed and agreeable to plan will monitor and reassess

## 2020-01-24 NOTE — CHART NOTE - NSCHARTNOTEFT_GEN_A_CORE
89 yo F comes in with N/V/D, passing gas, no abdominal pain. Afebrile, no WBC, lactate nl. Exam nontender, nondistended. CT with concern for possible mild dilation and possible swirling however CT scan reviewed and with patient's exam this does not fit clinically. Will admit, serial abdominal exams. IVF, NPO. Repeat lactate. Of note, patient claims anaphylaxis to contrast agents.     Seen with consult resident, discussed with attending.

## 2020-01-24 NOTE — H&P ADULT - NSHPLABSRESULTS_GEN_ALL_CORE
12.4   9.35  )-----------( 201      ( 24 Jan 2020 13:17 )             37.6   01-24    139  |  99  |  12  ----------------------------<  129<H>  4.2   |  26  |  0.97    Ca    10.5      24 Jan 2020 13:17    TPro  7.7  /  Alb  4.2  /  TBili  0.2  /  DBili  x   /  AST  19  /  ALT  11  /  AlkPhos  86  01-24    < from: CT Abdomen and Pelvis No Cont (01.24.20 @ 15:39) >    < from: CT Abdomen and Pelvis No Cont (01.24.20 @ 15:39) >      EXAM:  CT ABDOMEN AND PELVIS                          PROCEDURE DATE:  01/24/2020          INTERPRETATION:  Clinical information: Nausea vomiting, epigastric pain    Comparison: None    PROCEDURE:     CT of the Abdomen and Pelvis was performed without intravenous contrast.    Evaluation of abdominal and pelvic viscera, lymph nodes and vasculature is limited without intravenous contrast.    FINDINGS:    LOWER CHEST: Bibasilar subsegmental atelectasis and scarring.    ABDOMEN:  LIVER: Several cysts  BILE DUCTS: normal caliber  GALLBLADDER: no calcified gallstones. normal caliber wall  PANCREAS: within normal limits  SPLEEN: within normal limits  ADRENALS: within normal limits  KIDNEYS: within normal limits    PELVIS:  REPRODUCTIVE ORGANS: no pelvic masses  BLADDER: within normal limits    BOWEL: Several dilated mid small bowel loops and collapsed distal small bowel. There is associated mesenteric edema and mild mesenteric swirling but no compelling evidence of internal hernia. The colon is aerated and fluid-filled. No pneumatosis or bowel wall thickening. Sigmoid diverticulosis.  PERITONEUM: no ascites or free air, no fluid collection  RETROPERITONEUM: no enlarged retroperitoneal or pelvic nodes.    VESSELS: within normal limits for anoncontrast study.  ABDOMINAL WALL: within normal limits.  MUSCULOSKELETAL: within normal limits.    IMPRESSION:     Mid distal small bowel findings as described, the differential includes enteritis and partial obstruction.    < end of copied text >

## 2020-01-24 NOTE — H&P ADULT - ASSESSMENT
89F with PMh of HTN, GERD, CAD with stent on ASA and plavix, s/p PPM, PSH of partial hysterectomy 50 years ago, haemorrhoidectomy 50 years ago presents with 1d history of lower abd pain with nausea, vomiting and diarrhoea likely due to gastroenteritis however with radiographic evidence of dilated small bowel possible partial small bowel obstruction.    Plan  Admit to surgery team 4, regional, Dr. Waterman  NPO, IVF  IV APAP for pain  Zofran as needed  No NGT for now  DVT PPX, LVX, SCD  Serial abdominal exam  Discussed with attending and chief resident

## 2020-01-24 NOTE — H&P ADULT - HISTORY OF PRESENT ILLNESS
89F with PMh of HTN, GERD, CAD with stent on ASA and plavix, s/p PPM, PSH of partial hysterectomy 50 years ago, haemorrhoidectomy 50 years ago presents with 1d history of lower abd pain with nausea, vomiting and diarrhoea. All of her symptoms started this morning, Lower abd pain described as sharp, constant, no radiation, nothing makes the pain better or worse, 7/10 in severity, associated with nausea and 10 episodes of nonbloody vomiting eventually turning ileus, 5 episodes of watery, nonbloody diarrhoea. Denies fever, chills. Passing flatus, having bowel movement. Unable to tolerate PO due to vomiting. Currently she is feeling a lot better, pain free. Last colonoscopy last year, normal.

## 2020-01-24 NOTE — ED ADULT NURSE NOTE - NSIMPLEMENTINTERV_GEN_ALL_ED
Implemented All Fall with Harm Risk Interventions:  [place] to call system. Call bell, personal items and telephone within reach. Instruct patient to call for assistance. Room bathroom lighting operational. Non-slip footwear when patient is off stretcher. Physically safe environment: no spills, clutter or unnecessary equipment. Stretcher in lowest position, wheels locked, appropriate side rails in place. Provide visual cue, wrist band, yellow gown, etc. Monitor gait and stability. Monitor for mental status changes and reorient to person, place, and time. Review medications for side effects contributing to fall risk. Reinforce activity limits and safety measures with patient and family. Provide visual clues: red socks.

## 2020-01-24 NOTE — ED PROVIDER NOTE - NSFOLLOWUPINSTRUCTIONS_ED_ALL_ED_FT
Nausea / Vomiting    Nausea is the feeling that you have to vomit. As nausea gets worse, it can lead to vomiting. Vomiting puts you at an increased risk for dehydration. Older adults and people with other diseases or a weak immune system are at higher risk for dehydration. Drink clear fluids in small but frequent amounts as tolerated. Eat bland, easy-to-digest foods in small amounts as tolerated.    SEEK IMMEDIATE MEDICAL CARE IF YOU HAVE ANY OF THE FOLLOWING SYMPTOMS: fever, inability to keep sufficient fluids down, black or bloody vomitus, black or bloody stools, lightheadedness/dizziness, chest pain, severe headache, rash, shortness of breath, cold or clammy skin, confusion, pain with urination, or any signs of dehydration.     Diarrhea    Diarrhea is frequent loose or watery bowel movements that has many causes. Diarrhea can make you feel weak and cause you to become dehydrated. Diarrhea typically lasts 2–3 days, but can last longer if it is a sign of something more serious. Drink clear fluids to prevent dehydration. Eat bland, easy-to-digest foods as tolerated.     SEEK IMMEDIATE MEDICAL CARE IF YOU HAVE ANY OF THE FOLLOWING SYMPTOMS: high fevers, lightheadedness/dizziness, chest pain, black or bloody stools, shortness of breath, severe abdominal or back pain, or any signs of dehydration.

## 2020-01-24 NOTE — ED PROVIDER NOTE - CLINICAL SUMMARY MEDICAL DECISION MAKING FREE TEXT BOX
here w/ N/V/D, afebrile, no exposures. will check CT scan to r/o dangerous intraabdominal pathology, if neg, plan for po trial and dc home with supportive care w/ family

## 2020-01-24 NOTE — ED PROVIDER NOTE - PHYSICAL EXAMINATION
CONSTITUTIONAL: Elderly woman, frail, in no apparent distress.   HEAD: Normocephalic; atraumatic.   EYES:  conjunctiva and sclera clear  ENT: normal nose; no rhinorrhea; normal pharynx with no erythema or lesions.   NECK: Supple; non-tender;   CARDIOVASCULAR: Normal S1, S2; no murmurs, rubs, or gallops. Regular rate and rhythm.   RESPIRATORY: Breathing easily; breath sounds clear and equal bilaterally; no wheezes, rhonchi, or rales.  GI: Soft; non-distended; +periumbilical and epigastric tenderness to palpation, no CVA tenderness, no spinal tenderness. yellow emesis in basin.  EXT: No cyanosis or edema; N/V intact  SKIN: Normal for age and race; warm; dry; good turgor; no apparent lesions or rash.   NEURO: A & O x 3; face symmetric; grossly unremarkable.   PSYCHOLOGICAL: The patient’s mood and manner are appropriate.

## 2020-01-24 NOTE — H&P ADULT - NSHPPHYSICALEXAM_GEN_ALL_CORE
ICU Vital Signs Last 24 Hrs  T(C): 36.8 (24 Jan 2020 17:45), Max: 36.8 (24 Jan 2020 17:45)  T(F): 98.3 (24 Jan 2020 17:45), Max: 98.3 (24 Jan 2020 17:45)  HR: 71 (24 Jan 2020 17:45) (71 - 72)  BP: 155/90 (24 Jan 2020 17:45) (155/90 - 157/73)  BP(mean): --  ABP: --  ABP(mean): --  RR: 18 (24 Jan 2020 17:45) (18 - 18)  SpO2: 97% (24 Jan 2020 17:45) (97% - 98%)    GEN: NAD, awake, eyes open spontaneously  HEENT: NCAT, MMM, Trachea midline, normal conjunctiva, perrl  CHEST/LUNGS: Nonlaboured breathing, CTAB, bilateral breath sounds  CARDIAC: RRR, no m/r/g  ABDOMEN: Soft, Nontender, Nondistended, No rebound/guarding  MSK: No oedema, no gross deformity of extremities  SKIN: No rashes, no petechiae, no vesicles  NEURO: CN grossly intact, normal coordination, no focal motor or sensory deficits  PSYCH: Alert, appropriate, cooperative, with capacity and insight

## 2020-01-24 NOTE — H&P ADULT - NSHPREVIEWOFSYSTEMS_GEN_ALL_CORE
Constitutional: (-) fever, (-) chills  Eyes/ENT: (-) blurry vision, (-) epistaxis, (-) sore throat  Cardiovascular: (-) chest pain, (-) syncope  Respiratory: (-) cough, (-) shortness of breath  Gastrointestinal: (+) abdominal pain, (+) vomiting, (+) diarrhea  Musculoskeletal: (-) neck pain, (-) back pain, (-) joint pain  Integumentary: (-) rash, (-) edema  Neurological: (-) headache, (-) altered mental status  Psychiatric: (-) hallucinations,   Allergic/Immunologic: (-) pruritus

## 2020-01-24 NOTE — ED PROVIDER NOTE - CARE PLAN
Principal Discharge DX:	Nausea vomiting and diarrhea  Secondary Diagnosis:	Dehydration Principal Discharge DX:	Nausea vomiting and diarrhea  Secondary Diagnosis:	Dehydration  Secondary Diagnosis:	Partial obstruction of small intestine

## 2020-01-24 NOTE — ED PROVIDER NOTE - OBJECTIVE STATEMENT
89F accompanied by son w/ PMH of HTN, GERD, CAD, and PPM, hx partial hysterectomy, who presented to the ED with N/V/D since this am. Unable to keep any food/fluids down. no fevers/chills. no recent travel, sick contacts, antibiotic exposure. no upper resp symptoms, no dysuria/urgency/freq/hematuria. +periumbilical abdominal pain, mild, non radiating. did not take anything for it as unable to keep anything down. Last episode of emesis just prior to my eval, yellow.

## 2020-01-25 LAB
ANION GAP SERPL CALC-SCNC: 9 MMOL/L — SIGNIFICANT CHANGE UP (ref 5–17)
BUN SERPL-MCNC: 11 MG/DL — SIGNIFICANT CHANGE UP (ref 7–23)
CALCIUM SERPL-MCNC: 9 MG/DL — SIGNIFICANT CHANGE UP (ref 8.4–10.5)
CHLORIDE SERPL-SCNC: 109 MMOL/L — HIGH (ref 96–108)
CO2 SERPL-SCNC: 24 MMOL/L — SIGNIFICANT CHANGE UP (ref 22–31)
CREAT SERPL-MCNC: 0.87 MG/DL — SIGNIFICANT CHANGE UP (ref 0.5–1.3)
GLUCOSE SERPL-MCNC: 90 MG/DL — SIGNIFICANT CHANGE UP (ref 70–99)
HCT VFR BLD CALC: 29.3 % — LOW (ref 34.5–45)
HGB BLD-MCNC: 10 G/DL — LOW (ref 11.5–15.5)
MAGNESIUM SERPL-MCNC: 2.3 MG/DL — SIGNIFICANT CHANGE UP (ref 1.6–2.6)
MCHC RBC-ENTMCNC: 24.9 PG — LOW (ref 27–34)
MCHC RBC-ENTMCNC: 34.1 GM/DL — SIGNIFICANT CHANGE UP (ref 32–36)
MCV RBC AUTO: 73.1 FL — LOW (ref 80–100)
NRBC # BLD: 0 /100 WBCS — SIGNIFICANT CHANGE UP (ref 0–0)
PHOSPHATE SERPL-MCNC: 2.7 MG/DL — SIGNIFICANT CHANGE UP (ref 2.5–4.5)
PLATELET # BLD AUTO: 164 K/UL — SIGNIFICANT CHANGE UP (ref 150–400)
POTASSIUM SERPL-MCNC: 4.1 MMOL/L — SIGNIFICANT CHANGE UP (ref 3.5–5.3)
POTASSIUM SERPL-SCNC: 4.1 MMOL/L — SIGNIFICANT CHANGE UP (ref 3.5–5.3)
RBC # BLD: 4.01 M/UL — SIGNIFICANT CHANGE UP (ref 3.8–5.2)
RBC # FLD: 16.8 % — HIGH (ref 10.3–14.5)
SODIUM SERPL-SCNC: 142 MMOL/L — SIGNIFICANT CHANGE UP (ref 135–145)
WBC # BLD: 4.08 K/UL — SIGNIFICANT CHANGE UP (ref 3.8–10.5)
WBC # FLD AUTO: 4.08 K/UL — SIGNIFICANT CHANGE UP (ref 3.8–10.5)

## 2020-01-25 PROCEDURE — 93010 ELECTROCARDIOGRAM REPORT: CPT

## 2020-01-25 RX ORDER — HYDROMORPHONE HYDROCHLORIDE 2 MG/ML
0.25 INJECTION INTRAMUSCULAR; INTRAVENOUS; SUBCUTANEOUS ONCE
Refills: 0 | Status: DISCONTINUED | OUTPATIENT
Start: 2020-01-25 | End: 2020-01-26

## 2020-01-25 RX ORDER — FAMOTIDINE 10 MG/ML
20 INJECTION INTRAVENOUS DAILY
Refills: 0 | Status: DISCONTINUED | OUTPATIENT
Start: 2020-01-25 | End: 2020-01-25

## 2020-01-25 RX ORDER — HYDROMORPHONE HYDROCHLORIDE 2 MG/ML
0.25 INJECTION INTRAMUSCULAR; INTRAVENOUS; SUBCUTANEOUS ONCE
Refills: 0 | Status: DISCONTINUED | OUTPATIENT
Start: 2020-01-25 | End: 2020-01-25

## 2020-01-25 RX ORDER — PANTOPRAZOLE SODIUM 20 MG/1
40 TABLET, DELAYED RELEASE ORAL DAILY
Refills: 0 | Status: DISCONTINUED | OUTPATIENT
Start: 2020-01-25 | End: 2020-01-27

## 2020-01-25 RX ORDER — ACETAMINOPHEN 500 MG
1000 TABLET ORAL ONCE
Refills: 0 | Status: COMPLETED | OUTPATIENT
Start: 2020-01-25 | End: 2020-01-25

## 2020-01-25 RX ADMIN — ENOXAPARIN SODIUM 40 MILLIGRAM(S): 100 INJECTION SUBCUTANEOUS at 22:37

## 2020-01-25 RX ADMIN — HYDROMORPHONE HYDROCHLORIDE 0.25 MILLIGRAM(S): 2 INJECTION INTRAMUSCULAR; INTRAVENOUS; SUBCUTANEOUS at 03:15

## 2020-01-25 RX ADMIN — ONDANSETRON 4 MILLIGRAM(S): 8 TABLET, FILM COATED ORAL at 11:28

## 2020-01-25 RX ADMIN — HYDROMORPHONE HYDROCHLORIDE 0.25 MILLIGRAM(S): 2 INJECTION INTRAMUSCULAR; INTRAVENOUS; SUBCUTANEOUS at 04:45

## 2020-01-25 RX ADMIN — Medication 400 MILLIGRAM(S): at 15:07

## 2020-01-25 RX ADMIN — Medication 1000 MILLIGRAM(S): at 15:37

## 2020-01-25 RX ADMIN — PANTOPRAZOLE SODIUM 40 MILLIGRAM(S): 20 TABLET, DELAYED RELEASE ORAL at 11:24

## 2020-01-25 NOTE — CHART NOTE - NSCHARTNOTEFT_GEN_A_CORE
Patient seen and examined bedside.    Reports feeling okay, having some epigastric pain. No nausea or vomiting.     Bowel function: passing gas, no bowel movement    Vital Signs Last 24 Hrs  T(C): 36.6 (25 Jan 2020 21:00), Max: 36.9 (25 Jan 2020 13:41)  T(F): 97.9 (25 Jan 2020 21:00), Max: 98.4 (25 Jan 2020 13:41)  HR: 70 (25 Jan 2020 21:00) (69 - 71)  BP: 155/73 (25 Jan 2020 21:00) (126/62 - 158/72)  RR: 16 (25 Jan 2020 21:00) (16 - 17)  SpO2: 98% (25 Jan 2020 21:00) (95% - 98%)    Abdomen soft, non distended, mild tenderness in the epigastrum      Will continue to monitor.

## 2020-01-25 NOTE — PROGRESS NOTE ADULT - ASSESSMENT
89 y/o F w/ CAD, history of Gyn surgeries, admitted for enteritis vs SBO on HOD 1.  Abdomen non-distended, no emesis, passing flatus. Concern for SBO is mild at this point.  Given protonix, pepto for heartburn  Continue serial abdominal exams.

## 2020-01-25 NOTE — PROGRESS NOTE ADULT - SUBJECTIVE AND OBJECTIVE BOX
SUBJECTIVE:    Patient seen and examined at bedside. In no acute distress. Denies N/V. C/o heartburn. Passing gas.     OBJECTIVE:    Vital Signs Last 24 Hrs  T(C): 36.2 (2020 06:02), Max: 36.8 (2020 17:45)  T(F): 97.2 (2020 06:02), Max: 98.3 (2020 17:45)  HR: 70 (2020 06:02) (69 - 73)  BP: 138/68 (2020 06:02) (126/62 - 190/75)  BP(mean): --  RR: 17 (2020 06:02) (17 - 18)  SpO2: 95% (2020 06:02) (95% - 98%)    I&O's Summary    2020 07:01  -  2020 07:00  --------------------------------------------------------  IN: 1100 mL / OUT: 300 mL / NET: 800 mL    2020 07:01  -  2020 08:17  --------------------------------------------------------  IN: 100 mL / OUT: 0 mL / NET: 100 mL        Physical Exam:  General Appearance: Appears well, NAD  HEENT: Grossly symmetric  Chest: Non labored breathing  CV: Pulse regular presently  Abdomen: Soft, nontender, nondistended  Extremities: Grossly symmetric, no swelling, warm.     LABS:                        10.0   4.08  )-----------( 164      ( 2020 06:53 )             29.3         142  |  109<H>  |  11  ----------------------------<  90  4.1   |  24  |  0.87    Ca    9.0      2020 06:53  Phos  2.7       Mg     2.3     01-25    TPro  7.7  /  Alb  4.2  /  TBili  0.2  /  DBili  x   /  AST  19  /  ALT  11  /  AlkPhos  86  -24    PT/INR - ( 2020 13:17 )   PT: 11.0 sec;   INR: 0.97          PTT - ( 2020 13:17 )  PTT:27.7 sec  Urinalysis Basic - ( 2020 18:03 )    Color: Yellow / Appearance: Clear / S.010 / pH: x  Gluc: x / Ketone: NEGATIVE  / Bili: Negative / Urobili: 0.2 E.U./dL   Blood: x / Protein: NEGATIVE mg/dL / Nitrite: NEGATIVE   Leuk Esterase: NEGATIVE / RBC: x / WBC x   Sq Epi: x / Non Sq Epi: x / Bacteria: x        RADIOLOGY & ADDITIONAL STUDIES:

## 2020-01-25 NOTE — PROVIDER CONTACT NOTE (OTHER) - ASSESSMENT
pt given IV protonix per med orders, medication flushed. about 10-15 seconds after med flushed pt became extremely nauseous and started dry heaving. pt given anti-nausea med zofran as per orders. pt denies chest pain and SOB. pt had headache after dry heaving.

## 2020-01-26 DIAGNOSIS — F43.22 ADJUSTMENT DISORDER WITH ANXIETY: ICD-10-CM

## 2020-01-26 LAB
ANION GAP SERPL CALC-SCNC: 16 MMOL/L — SIGNIFICANT CHANGE UP (ref 5–17)
BUN SERPL-MCNC: 10 MG/DL — SIGNIFICANT CHANGE UP (ref 7–23)
CALCIUM SERPL-MCNC: 9.4 MG/DL — SIGNIFICANT CHANGE UP (ref 8.4–10.5)
CHLORIDE SERPL-SCNC: 103 MMOL/L — SIGNIFICANT CHANGE UP (ref 96–108)
CO2 SERPL-SCNC: 21 MMOL/L — LOW (ref 22–31)
CREAT SERPL-MCNC: 0.79 MG/DL — SIGNIFICANT CHANGE UP (ref 0.5–1.3)
FOLATE SERPL-MCNC: >20 NG/ML — SIGNIFICANT CHANGE UP
GLUCOSE SERPL-MCNC: 67 MG/DL — LOW (ref 70–99)
HCT VFR BLD CALC: 34.4 % — LOW (ref 34.5–45)
HGB BLD-MCNC: 11.7 G/DL — SIGNIFICANT CHANGE UP (ref 11.5–15.5)
MAGNESIUM SERPL-MCNC: 2 MG/DL — SIGNIFICANT CHANGE UP (ref 1.6–2.6)
MCHC RBC-ENTMCNC: 24.7 PG — LOW (ref 27–34)
MCHC RBC-ENTMCNC: 34 GM/DL — SIGNIFICANT CHANGE UP (ref 32–36)
MCV RBC AUTO: 72.7 FL — LOW (ref 80–100)
NRBC # BLD: 0 /100 WBCS — SIGNIFICANT CHANGE UP (ref 0–0)
PHOSPHATE SERPL-MCNC: 2.8 MG/DL — SIGNIFICANT CHANGE UP (ref 2.5–4.5)
PLATELET # BLD AUTO: 191 K/UL — SIGNIFICANT CHANGE UP (ref 150–400)
POTASSIUM SERPL-MCNC: 3.4 MMOL/L — LOW (ref 3.5–5.3)
POTASSIUM SERPL-SCNC: 3.4 MMOL/L — LOW (ref 3.5–5.3)
RBC # BLD: 4.73 M/UL — SIGNIFICANT CHANGE UP (ref 3.8–5.2)
RBC # FLD: 17 % — HIGH (ref 10.3–14.5)
SODIUM SERPL-SCNC: 140 MMOL/L — SIGNIFICANT CHANGE UP (ref 135–145)
VIT B12 SERPL-MCNC: 1062 PG/ML — SIGNIFICANT CHANGE UP (ref 232–1245)
WBC # BLD: 5.25 K/UL — SIGNIFICANT CHANGE UP (ref 3.8–10.5)
WBC # FLD AUTO: 5.25 K/UL — SIGNIFICANT CHANGE UP (ref 3.8–10.5)

## 2020-01-26 PROCEDURE — 99222 1ST HOSP IP/OBS MODERATE 55: CPT

## 2020-01-26 PROCEDURE — 74019 RADEX ABDOMEN 2 VIEWS: CPT | Mod: 26

## 2020-01-26 RX ORDER — POTASSIUM CHLORIDE 20 MEQ
10 PACKET (EA) ORAL
Refills: 0 | Status: COMPLETED | OUTPATIENT
Start: 2020-01-26 | End: 2020-01-26

## 2020-01-26 RX ORDER — METOPROLOL TARTRATE 50 MG
50 TABLET ORAL DAILY
Refills: 0 | Status: DISCONTINUED | OUTPATIENT
Start: 2020-01-26 | End: 2020-01-27

## 2020-01-26 RX ORDER — FAMOTIDINE 10 MG/ML
20 INJECTION INTRAVENOUS DAILY
Refills: 0 | Status: DISCONTINUED | OUTPATIENT
Start: 2020-01-26 | End: 2020-01-27

## 2020-01-26 RX ADMIN — FAMOTIDINE 20 MILLIGRAM(S): 10 INJECTION INTRAVENOUS at 10:17

## 2020-01-26 RX ADMIN — Medication 100 MILLIEQUIVALENT(S): at 14:41

## 2020-01-26 RX ADMIN — Medication 30 MILLILITER(S): at 18:48

## 2020-01-26 RX ADMIN — Medication 100 MILLIEQUIVALENT(S): at 10:17

## 2020-01-26 RX ADMIN — ENOXAPARIN SODIUM 40 MILLIGRAM(S): 100 INJECTION SUBCUTANEOUS at 21:34

## 2020-01-26 RX ADMIN — Medication 100 MILLIEQUIVALENT(S): at 18:47

## 2020-01-26 RX ADMIN — Medication 50 MILLIGRAM(S): at 10:17

## 2020-01-26 NOTE — BEHAVIORAL HEALTH ASSESSMENT NOTE - RISK ASSESSMENT
Low Acute Suicide Risk Multiple protective factors (Worship, no SI, no history of SA, domiciled, good support network). Denying SI.

## 2020-01-26 NOTE — BEHAVIORAL HEALTH ASSESSMENT NOTE - HPI (INCLUDE ILLNESS QUALITY, SEVERITY, DURATION, TIMING, CONTEXT, MODIFYING FACTORS, ASSOCIATED SIGNS AND SYMPTOMS)
89yo female with PMHx ofHTN, GERD, CAD with stent on ASA and plavix, s/p PPM, PSH of partial hysterectomy 50 years ago, haemorrhoidectomy 50 years ago and no PPHx who presents to [facility] for evaluation of nausea, vomiting, and abdominal pain. Admitted to surgical floor for workup of bowel obstructions. Patient overnight began to complain of significant "burning" sensation in multiple parts of her body. Given that there is no identifiable explanation for distribution of patient's pain, neurology and psychiatry consulted to evaluate. Neurology found no significant findings. On interview patient found to be calm, cooperative, pleasant. She reports that she was in excruciating pain last night. Describes a burning sensation akin to acid reflux pain she is familiar with however located throughout her abdomen. Feels that pain is deep in her stomach, not superficial. Noted that passing gas brought slight relief. During this pain episode she also developed cramping of bilateral calf muscles. States that pain was so intense that it felt like her whole body hurt, but still identifies pain as primarily being in her abdomen. Currently has abdominal pain that is at it's "usual" level, located in epigastric region. Patient does not feel that pain is associated with her emotional state. In fact, patient denies any anxiety or depressive symptoms, though admits she is worried that she will have the same pain again tonight. Discussed various aspects of her life. Patient highlights that she feels that she has lived a very good life. 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 89yo female with PMHx of HTN, GERD, CAD with stent on ASA and plavix, s/p PPM, PSH of partial hysterectomy 50 years ago, haemorrhoidectomy 50 years ago and no PPHx who presents to [facility] for evaluation of nausea, vomiting, and abdominal pain. Admitted to surgical floor for workup of bowel obstructions. Patient overnight began to complain of significant "burning" sensation in multiple parts of her body. Given that there is no identifiable explanation for distribution of patient's pain, neurology and psychiatry consulted to evaluate. Neurology found no significant findings. On interview patient found to be calm, cooperative, pleasant. She reports that she was in excruciating pain last night. Describes a burning sensation akin to acid reflux pain she is familiar with however located throughout her abdomen. Feels that pain is deep in her stomach, not superficial. Noted that passing gas brought slight relief. During this pain episode she also developed cramping of bilateral calf muscles. States that pain was so intense that it felt like her whole body hurt, but still identifies pain as primarily being in her abdomen. Currently has abdominal pain that is at it's "usual" level, located in epigastric region. Notes that she has chronically had "reflux pain"  for years that is not affected by medications. States that previous evaluations by doctors have not been able to find why her symptoms do not respond to medications. She has also had evaluations for rectal pain that previous doctors have diagnosed as spasms with no identifiable cause.  Patient does not feel that pain is associated with her emotional state. In fact, patient denies any anxiety or depressive symptoms, though admits she is worried that she will have the same pain again tonight. Discussed various aspects of her life. Patient highlights that she feels that she has lived a very good life. 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 Denies feelings of loneliness. Feels safe and "very happy" at home. Denies any psychotic or manic symptoms. Denies SI/HI.

## 2020-01-26 NOTE — BEHAVIORAL HEALTH ASSESSMENT NOTE - SUICIDE PROTECTIVE FACTORS
Nondenominational beliefs/Cultural, spiritual and/or moral attitudes against suicide/Ability to cope with stress/Responsibility to family and others/Identifies reasons for living/Has future plans/Supportive social network of family or friends

## 2020-01-26 NOTE — PROGRESS NOTE ADULT - SUBJECTIVE AND OBJECTIVE BOX
PT. seen and examined at bed side.    Case discussed withSurgical team.    Case discussed with Consultants.    Orders reviewed.    Plan:as per orders.    Full note to follow.

## 2020-01-26 NOTE — PROGRESS NOTE ADULT - SUBJECTIVE AND OBJECTIVE BOX
INTERVAL HPI/OVERNIGHT EVENTS: DANNY    SUBJECTIVE:  pt seen at bedside, complaining of burning in stomach, radiating to her back and legs. denies passing flatus/BM, denies nausea/vomiting    MEDICATIONS  (STANDING):  enoxaparin Injectable 40 milliGRAM(s) SubCutaneous daily  pantoprazole  Injectable 40 milliGRAM(s) IV Push daily  sodium chloride 0.9%. 1000 milliLiter(s) (100 mL/Hr) IV Continuous <Continuous>    MEDICATIONS  (PRN):  bismuth subsalicylate Liquid 30 milliLiter(s) Oral every 6 hours PRN Diarrhea  ondansetron Injectable 4 milliGRAM(s) IV Push every 6 hours PRN Nausea and/or Vomiting      Vital Signs Last 24 Hrs  T(C): 36.7 (2020 05:43), Max: 36.9 (2020 13:41)  T(F): 98.1 (2020 05:43), Max: 98.4 (2020 13:41)  HR: 70 (2020 05:43) (69 - 71)  BP: 167/66 (2020 05:43) (131/62 - 167/66)  BP(mean): --  RR: 17 (2020 05:43) (16 - 17)  SpO2: 98% (2020 05:43) (96% - 98%)    PHYSICAL EXAM:      Constitutional: A&Ox3    Respiratory: non labored breathing, no respiratory distress    Cardiovascular: NSR, RRR    Gastrointestinal: soft, nondistended, mild diffuse tenderness    Extremities: (-) edema                  I&O's Detail    2020 07:01  -  2020 07:00  --------------------------------------------------------  IN:    Oral Fluid: 180 mL    sodium chloride 0.9%.: 2400 mL    Solution: 100 mL  Total IN: 2680 mL    OUT:  Total OUT: 0 mL    Total NET: 2680 mL      2020 07:01  -  2020 08:32  --------------------------------------------------------  IN:    sodium chloride 0.9%.: 100 mL  Total IN: 100 mL    OUT:  Total OUT: 0 mL    Total NET: 100 mL          LABS:                        11.7   5.25  )-----------( 191      ( 2020 07:47 )             34.4     -    142  |  109<H>  |  11  ----------------------------<  90  4.1   |  24  |  0.87    Ca    9.0      2020 06:53  Phos  2.7       Mg     2.3         TPro  7.7  /  Alb  4.2  /  TBili  0.2  /  DBili  x   /  AST  19  /  ALT  11  /  AlkPhos  86      PT/INR - ( 2020 13:17 )   PT: 11.0 sec;   INR: 0.97          PTT - ( 2020 13:17 )  PTT:27.7 sec  Urinalysis Basic - ( 2020 18:03 )    Color: Yellow / Appearance: Clear / S.010 / pH: x  Gluc: x / Ketone: NEGATIVE  / Bili: Negative / Urobili: 0.2 E.U./dL   Blood: x / Protein: NEGATIVE mg/dL / Nitrite: NEGATIVE   Leuk Esterase: NEGATIVE / RBC: x / WBC x   Sq Epi: x / Non Sq Epi: x / Bacteria: x        RADIOLOGY & ADDITIONAL STUDIES:

## 2020-01-26 NOTE — PROGRESS NOTE ADULT - ASSESSMENT
89F with PMh of HTN, GERD, CAD with stent on ASA and plavix, s/p PPM, PSH of partial hysterectomy 50 years ago, haemorrhoidectomy 50 years ago presents with 1d history of lower abd pain with nausea, vomiting and diarrhoea likely due to gastroenteritis however with radiographic evidence of dilated small bowel possible partial small bowel obstruction.    NPO, IVF  IV APAP for pain  Zofran as needed  No NGT for now  DVT PPX, LVX, SCD  Serial abdominal exam

## 2020-01-26 NOTE — BEHAVIORAL HEALTH ASSESSMENT NOTE - OTHER
not assessed Slightly impaired due to not having dentures in. Anxious about developing pain symptoms again, but overall "good" Abdominal pain

## 2020-01-26 NOTE — BEHAVIORAL HEALTH ASSESSMENT NOTE - NS ED B[facility] ALCOHOL
Infectious Disease Consult    Today's Date: 11/14/2019   Admit Date: 11/8/2019    Impression:   · Charcot changes left foot with ulceration--no osteo on multiple biopsies, but coag neg staph from one operative culture--not sure that it is significant  · MRI concerning for osteo, but may be Charcot  Changes, also    Plan:   · Continue current therapy  · Will discuss     Anti-infectives:   · Vancomycin   · Cefepime     Subjective:   Date of Consultation:  November 14, 2019  Referring Physician: Dr Caroline Lehman    Patient is a 50 y.o. female who is known to me from care in the past. She has longstanding diabetes and associated neuropathy with Charcot changes of the foot. She has a history of deep infection of the foot that was treated with surgery and extended IV antibiotic therapy--she did well up until recently. She is admitted for management of Charcot foot. She is status post debridement and biopsy of the foot. Cultures are growing a minimal amount of coag negative staph and we are asked to see her in consultation.       Patient Active Problem List   Diagnosis Code    Diabetes mellitus type 1, uncontrolled, insulin dependent ([facility]) E10.65    Anxiety state F41.1    Unspecified essential hypertension I10    HTN (hypertension) I10    DM type 2 (diabetes mellitus, type 2) ([facility]) E11.9    Acute osteomyelitis of left foot ([facility]) M86.172    Cellulitis and abscess of toe of left foot L03.032, L02.612    Uncontrolled type 2 diabetes mellitus with peripheral neuropathy ([facility]) E11.42, E11.65    Osteomyelitis of second toe of left foot ([facility]) M86.9    Acute osteomyelitis of metatarsal bone of left foot ([address] M86.172    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 ([address] E11.621, L97.424    Left foot infection L08.9    Type 2 diabetes mellitus with Charcot's joint of left foot ([address] E11.610    Charcot's joint of left foot [id number]     Past Medical History:   Diagnosis Date    Cataract     Cataracts, bilateral     [facility]     Osteomyelitis ([facility]       Family History   Problem Relation Age of Onset    Hypertension Mother     Cancer Father         prostate    Diabetes Maternal Grandmother     Hypertension Maternal Grandmother       Social History     Tobacco Use    Smoking status: Never Smoker    Smokeless tobacco: Never Used   Substance Use Topics    Alcohol use: Yes     Comment: rarely     Past Surgical History:   Procedure Laterality Date    HX CATARACT REMOVAL  2011; 5-11    right eye; left    HX TONSIL AND ADENOIDECTOMY      HX TONSILLECTOMY      HX TUBAL LIGATION        Prior to Admission medications    Medication Sig Start Date End Date Taking? Authorizing Provider   HYDROcodone-acetaminophen (NORCO) 5-325 mg per tablet Take 1 Tab by mouth nightly as needed for Pain. Yes Provider, Historical   insulin regular (NOVOLIN R REGULAR U-100 INSULN) 100 unit/mL injection by SubCUTAneous route three (3) times daily (with meals). Sliding scale insulin   Yes Provider, Historical   insulin detemir U-100 (LEVEMIR U-100 INSULIN) 100 unit/mL injection 22 Units by SubCUTAneous route two (2) times a day. Yes Provider, Historical       No Known Allergies     Review of Systems:  A comprehensive review of systems was negative. Objective:     Visit Vitals  /86 (BP 1 Location: Right arm, BP Patient Position: At rest)   Pulse 75   Temp 98.4 °F (36.9 °C)   Resp 18   Ht 5' 6\" (1.676 m)   Wt 91.8 kg (202 lb 6.1 oz)   SpO2 92%   BMI 32.67 kg/m²     Temp (24hrs), Av.3 °F (36.8 °C), Min:98 °F (36.7 °C), Max:98.7 °F (37.1 °C)       Lines:  Peripheral IV:       Physical Exam:  Lungs:  clear to auscultation bilaterally  Heart:  regular rate and rhythm  Abdomen:  soft, non-tender.  Bowel sounds normal. No masses,  no organomegaly  Skin:  no rash or abnormalities  Wound dressed, no proximal cellulitis     Data Review:     CBC:  Recent Labs     19  0929 19  0551 19  1015   WBC 6.8 6.6 7.6 GRANS 63 62 75   MONOS 10 10 8   EOS 5 5 3   ANEU 4.2 4.2 5.7   ABL 1.4 1.4 1.0   HGB 8.6* 7.5* 7.8*   HCT 28.4* 25.4* 26.1*    301 320       BMP:  Recent Labs     11/14/19  0929 11/14/19  0620 11/13/19  1627   CREA 1.91* 1.91* 1.98*   BUN 27* 28* 27*    139 137   K 4.1 4.2 4.1   * 110* 108   CO2 24 21 22   AGAP 5 8 7   * 132* 218*       LFTS:  Recent Labs     11/14/19  0929   TBILI 0.2   ALT 26   SGOT 22   *   TP 7.0   ALB 2.5*       Microbiology:     All Micro Results     Procedure Component Value Units Date/Time    CULTURE, TISSUE Hettie Hollering STAIN [[id number]]  (Abnormal) Collected:  11/11/19 1315    Order Status:  Completed Specimen:  Tissue Updated:  11/14/19 1136     Special Requests: 4TH MET BONE     GRAM STAIN RARE WBCS SEEN         NO ORGANISMS SEEN        Culture result:       STAPHYLOCOCCUS SPECIES, COAGULASE NEGATIVE ISOLATED FROM THIO BROTH ONLY                  Specimen not collected anaerobically, recovery of anaerobes may be compromised. Anaerobic screening performed based on source.           CULTURE, ANAEROBIC [[id number]] Collected:  11/11/19 1305    Order Status:  Completed Specimen:  Swab Updated:  11/14/19 1132     Special Requests: NO SPECIAL REQUESTS        Culture result: NO GROWTH 3 DAYS       CULTURE, TISSUE Carmen Acosta [[id number]] Collected:  11/11/19 1305    Order Status:  Completed Specimen:  Tissue Updated:  11/14/19 1132     Special Requests: LEFT FOURTH TARSAL METATARSAL JOINT     GRAM STAIN RARE WBCS SEEN         NO ORGANISMS SEEN        Culture result: NO GROWTH 3 DAYS       CULTURE, BLOOD, PAIRED [[id number]] Collected:  11/08/19 1005    Order Status:  Completed Specimen:  Blood Updated:  11/13/19 0771     Special Requests: NO SPECIAL REQUESTS        Culture result: NO GROWTH 5 DAYS             Imaging:   Reviewed     Signed By: Rachel Montiel MD     November 14, 2019 None known

## 2020-01-26 NOTE — BEHAVIORAL HEALTH ASSESSMENT NOTE - OTHER PAST PSYCHIATRIC HISTORY (INCLUDE DETAILS REGARDING ONSET, COURSE OF ILLNESS, INPATIENT/OUTPATIENT TREATMENT)
Never been psychiatrically hospitalized. Never seen a psychiatrist. Never taken psychiatric medications.

## 2020-01-26 NOTE — BEHAVIORAL HEALTH ASSESSMENT NOTE - SUMMARY
Order placed for CPAP set up at 11cm of water pressure with nasal mask
89yo female with PMHx of HTN, GERD, CAD with stent on ASA and plavix, s/p PPM, PSH of partial hysterectomy 50 years ago, haemorrhoidectomy 50 years ago and no PPHx who presents to [facility] for evaluation of nausea, vomiting, and abdominal pain. Psychiaty consulted to evaluate complaint of pain symptoms incongruent with current findings.     Based on evaluation, patient is not exhibiting any psychotic, paranoid, or delusional symptoms. She is fully oriented and did very well on cognitive testing. Her concern about experiencing the same pain as she did overnight appears to be at a rational and expected level. Further evaluation and interview is required to rule out psychosomatic cause however at this time it appears unlikely. Recommend continued medical workup. Otherwise patient is not an acute danger to herself or others, denying SI/HI. No grounds for involuntary psychiatric hospitalizations.

## 2020-01-26 NOTE — PROGRESS NOTE ADULT - ASSESSMENT
Constellation of symptoms (burning spreading from chest / upper abdomen to back and legs associated with tight feeling of rectum and sweats) are not likely to be neurological in nature.   She does have a recent diagnosis of neuropathy, although there is no significant sensory or motor dysfunction on exam, and her current symptoms are not very consistent with that diagnosis   f/u B12, folate levels   No further neurologic workup at this time  Call back with further questions

## 2020-01-26 NOTE — BEHAVIORAL HEALTH ASSESSMENT NOTE - DESCRIPTION
HTN, GERD, CAD with stent on ASA and plavix, s/p PPM, PSH of partial hysterectomy 50 years ago, haemorrhoidectomy 50 years ago

## 2020-01-26 NOTE — PROGRESS NOTE ADULT - SUBJECTIVE AND OBJECTIVE BOX
CC: burning in abdomen and legs    HPI: 89 year old woman admitted for abdominal pain, nausea, "burning" sensation in chest / abdomen.  Now complaining that burning sensation spreads from chest / epigastrum to back and legs, especially when her rectum feels tight when she is trying to pass gas but cannot.   Also feels hot all over and has sweating during these episodes.   She was recently diagnosed with neuropathy due to numbness in her feet. She was not told what the neuropathy might be caused by. She does not have a history of diabetes mellitus.     Data reviewed:  Labs:  01-26    140  |  103  |  10  ----------------------------<  67<L>  3.4<L>   |  21<L>  |  0.79    Ca    9.4      26 Jan 2020 07:47  Phos  2.8     01-26  Mg     2.0     01-26    TPro  7.7  /  Alb  4.2  /  TBili  0.2  /  DBili  x   /  AST  19  /  ALT  11  /  AlkPhos  86  01-24                        11.7   5.25  )-----------( 191      ( 26 Jan 2020 07:47 )             34.4       Imaging (independently reviewed): CT A/P - multilevel degenerative disease of the spine with canal stenosis   Prior records: surgery notes reviewed      Other problems:    ROS: 13 pt review of systems performed and reviewed with patient (General, Eyes, Ears, Cardiovascular, Respiratory, Gastrointestinal, Genitourinary, Musculoskeletal, Skin, Endocrine, Hematologic, Psychiatric, Neurologic); negative except as noted above.    PAST MEDICAL & SURGICAL HISTORY:  Pacemaker  Hemorrhoid  Essential hypertension: Hypertension  Pulmonary embolism with infarction: Pulmonary embolism  Atherosclerosis of coronary artery: CAD (coronary artery disease)  Gastroesophageal reflux disease: GERD (gastroesophageal reflux disease)  Artificial pacemaker  Status post total hysterectomy: S/P hysterectomy    FAMILY HISTORY:  Problems influencing health status: No family history of coronary artery disease  No pertinent family history: No significant family history    Allergies  iodinated radiocontrast agents (Anaphylaxis)      Home Medications:  amLODIPine 10 mg oral tablet: 1 tab(s) orally once a day (24 Jan 2020 19:14)  aspirin 81 mg oral tablet, chewable: 1 tab(s) orally once a day (24 Jan 2020 21:06)  metoprolol succinate 50 mg oral tablet, extended release: 1 tab(s) orally once a day (24 Jan 2020 19:14)  omeprazole 20 mg oral delayed release capsule: 1 cap(s) orally once a day (24 Jan 2020 21:06)      Vital Signs Last 24 Hrs  T(C): 36.5 (26 Jan 2020 09:00), Max: 36.9 (25 Jan 2020 13:41)  T(F): 97.7 (26 Jan 2020 09:00), Max: 98.4 (25 Jan 2020 13:41)  HR: 72 (26 Jan 2020 09:00) (69 - 72)  BP: 161/79 (26 Jan 2020 09:00) (131/62 - 167/66)  BP(mean): --  RR: 17 (26 Jan 2020 09:00) (16 - 17)  SpO2: 98% (26 Jan 2020 09:00) (96% - 98%)    Exam:  Gen: appears well, well-nourished, no acute distress    MS: awake, alert, speech fluent, comprehension intact, follows commands    CN: PERRL, EOMI, visual fields full, facial strength and sensation intact and symmetric, palate elevation symmetric, tongue midline, no tongue atrophy or fasciculations    Motor: normal bulk and tone, 5/5 strength throughout, no abnormal movements    Sensory: light touch intact and symmetric throughout; vibration intact at ankles b/l    Reflexes: hyporeflexic throughout     Coordination: no dysmetria on finger to nose, Romberg negative    Gait: deferred    Skin: no rash on extremities    CV: no significant pedal edema    Ophtho: fundi not visualized

## 2020-01-27 ENCOUNTER — TRANSCRIPTION ENCOUNTER (OUTPATIENT)
Age: 85
End: 2020-01-27

## 2020-01-27 VITALS
RESPIRATION RATE: 18 BRPM | TEMPERATURE: 98 F | DIASTOLIC BLOOD PRESSURE: 81 MMHG | OXYGEN SATURATION: 97 % | SYSTOLIC BLOOD PRESSURE: 177 MMHG | HEART RATE: 69 BPM

## 2020-01-27 LAB
ANION GAP SERPL CALC-SCNC: 14 MMOL/L — SIGNIFICANT CHANGE UP (ref 5–17)
BUN SERPL-MCNC: 11 MG/DL — SIGNIFICANT CHANGE UP (ref 7–23)
CALCIUM SERPL-MCNC: 9.2 MG/DL — SIGNIFICANT CHANGE UP (ref 8.4–10.5)
CHLORIDE SERPL-SCNC: 99 MMOL/L — SIGNIFICANT CHANGE UP (ref 96–108)
CO2 SERPL-SCNC: 21 MMOL/L — LOW (ref 22–31)
CREAT SERPL-MCNC: 0.75 MG/DL — SIGNIFICANT CHANGE UP (ref 0.5–1.3)
GLUCOSE SERPL-MCNC: 53 MG/DL — LOW (ref 70–99)
HCT VFR BLD CALC: 32 % — LOW (ref 34.5–45)
HGB BLD-MCNC: 11 G/DL — LOW (ref 11.5–15.5)
MAGNESIUM SERPL-MCNC: 1.8 MG/DL — SIGNIFICANT CHANGE UP (ref 1.6–2.6)
MCHC RBC-ENTMCNC: 24.9 PG — LOW (ref 27–34)
MCHC RBC-ENTMCNC: 34.4 GM/DL — SIGNIFICANT CHANGE UP (ref 32–36)
MCV RBC AUTO: 72.4 FL — LOW (ref 80–100)
NRBC # BLD: 0 /100 WBCS — SIGNIFICANT CHANGE UP (ref 0–0)
PHOSPHATE SERPL-MCNC: 3 MG/DL — SIGNIFICANT CHANGE UP (ref 2.5–4.5)
PLATELET # BLD AUTO: 172 K/UL — SIGNIFICANT CHANGE UP (ref 150–400)
POTASSIUM SERPL-MCNC: 3.7 MMOL/L — SIGNIFICANT CHANGE UP (ref 3.5–5.3)
POTASSIUM SERPL-SCNC: 3.7 MMOL/L — SIGNIFICANT CHANGE UP (ref 3.5–5.3)
RBC # BLD: 4.42 M/UL — SIGNIFICANT CHANGE UP (ref 3.8–5.2)
RBC # FLD: 16.8 % — HIGH (ref 10.3–14.5)
SODIUM SERPL-SCNC: 134 MMOL/L — LOW (ref 135–145)
WBC # BLD: 4.08 K/UL — SIGNIFICANT CHANGE UP (ref 3.8–10.5)
WBC # FLD AUTO: 4.08 K/UL — SIGNIFICANT CHANGE UP (ref 3.8–10.5)

## 2020-01-27 PROCEDURE — 83690 ASSAY OF LIPASE: CPT

## 2020-01-27 PROCEDURE — 82746 ASSAY OF FOLIC ACID SERUM: CPT

## 2020-01-27 PROCEDURE — 85610 PROTHROMBIN TIME: CPT

## 2020-01-27 PROCEDURE — 85027 COMPLETE CBC AUTOMATED: CPT

## 2020-01-27 PROCEDURE — 74019 RADEX ABDOMEN 2 VIEWS: CPT

## 2020-01-27 PROCEDURE — 83735 ASSAY OF MAGNESIUM: CPT

## 2020-01-27 PROCEDURE — 83605 ASSAY OF LACTIC ACID: CPT

## 2020-01-27 PROCEDURE — 99238 HOSP IP/OBS DSCHRG MGMT 30/<: CPT

## 2020-01-27 PROCEDURE — 36415 COLL VENOUS BLD VENIPUNCTURE: CPT

## 2020-01-27 PROCEDURE — 74176 CT ABD & PELVIS W/O CONTRAST: CPT

## 2020-01-27 PROCEDURE — 97161 PT EVAL LOW COMPLEX 20 MIN: CPT

## 2020-01-27 PROCEDURE — 80053 COMPREHEN METABOLIC PANEL: CPT

## 2020-01-27 PROCEDURE — 84100 ASSAY OF PHOSPHORUS: CPT

## 2020-01-27 PROCEDURE — 99285 EMERGENCY DEPT VISIT HI MDM: CPT | Mod: 25

## 2020-01-27 PROCEDURE — 96375 TX/PRO/DX INJ NEW DRUG ADDON: CPT

## 2020-01-27 PROCEDURE — 82607 VITAMIN B-12: CPT

## 2020-01-27 PROCEDURE — 85730 THROMBOPLASTIN TIME PARTIAL: CPT

## 2020-01-27 PROCEDURE — 81003 URINALYSIS AUTO W/O SCOPE: CPT

## 2020-01-27 PROCEDURE — 80048 BASIC METABOLIC PNL TOTAL CA: CPT

## 2020-01-27 PROCEDURE — 96374 THER/PROPH/DIAG INJ IV PUSH: CPT

## 2020-01-27 PROCEDURE — 85025 COMPLETE CBC W/AUTO DIFF WBC: CPT

## 2020-01-27 PROCEDURE — 93005 ELECTROCARDIOGRAM TRACING: CPT

## 2020-01-27 RX ORDER — OMEPRAZOLE 10 MG/1
1 CAPSULE, DELAYED RELEASE ORAL
Qty: 0 | Refills: 0 | DISCHARGE

## 2020-01-27 RX ORDER — AMLODIPINE BESYLATE 2.5 MG/1
5 TABLET ORAL ONCE
Refills: 0 | Status: COMPLETED | OUTPATIENT
Start: 2020-01-27 | End: 2020-01-27

## 2020-01-27 RX ORDER — PANTOPRAZOLE SODIUM 20 MG/1
1 TABLET, DELAYED RELEASE ORAL
Qty: 30 | Refills: 1
Start: 2020-01-27 | End: 2020-03-26

## 2020-01-27 RX ADMIN — PANTOPRAZOLE SODIUM 40 MILLIGRAM(S): 20 TABLET, DELAYED RELEASE ORAL at 12:40

## 2020-01-27 RX ADMIN — Medication 30 MILLILITER(S): at 05:39

## 2020-01-27 RX ADMIN — Medication 50 MILLIGRAM(S): at 05:39

## 2020-01-27 RX ADMIN — ONDANSETRON 4 MILLIGRAM(S): 8 TABLET, FILM COATED ORAL at 12:45

## 2020-01-27 RX ADMIN — AMLODIPINE BESYLATE 5 MILLIGRAM(S): 2.5 TABLET ORAL at 09:11

## 2020-01-27 NOTE — PROGRESS NOTE ADULT - ASSESSMENT
89F with PMh of HTN, GERD, CAD with stent on ASA and plavix, s/p PPM, PSH of partial hysterectomy 50 years ago, haemorrhoidectomy 50 years ago presents with 1d history of lower abd pain with nausea, vomiting and diarrhoea likely due to gastroenteritis however with radiographic evidence of dilated small bowel possible partial small bowel obstruction. Now with some return of bowel function.     NPO, IVF  IV APAP for pain  Zofran as needed  No NGT for now  DVT PPX, LVX, SCD  Serial abdominal exam

## 2020-01-27 NOTE — PROGRESS NOTE ADULT - SUBJECTIVE AND OBJECTIVE BOX
SUBJECTIVE: Patient seen and examined by chief resident on morning rounds. Pain controlled, no n/v, passing flatus, no bowel movement. Has a mild headache.       Vital Signs Last 24 Hrs  T(C): 37 (27 Jan 2020 06:20), Max: 37 (27 Jan 2020 06:20)  T(F): 98.6 (27 Jan 2020 06:20), Max: 98.6 (27 Jan 2020 06:20)  HR: 69 (27 Jan 2020 06:20) (69 - 72)  BP: 170/77 (27 Jan 2020 06:20) (148/73 - 170/77)  BP(mean): --  RR: 17 (27 Jan 2020 06:20) (17 - 17)  SpO2: 97% (27 Jan 2020 06:20) (97% - 98%)    Physical Exam:  General: NAD  Pulmonary: Nonlabored breathing, no respiratory distress  Abdominal: soft, nondistended, nontender with no rebound or guarding  Extremities: WWP, normal strength, no clubbing/cyanosis/edema  Neuro: A/O x3    Lines/drains/tubes:    I&O's Summary    26 Jan 2020 07:01  -  27 Jan 2020 07:00  --------------------------------------------------------  IN: 100 mL / OUT: 1300 mL / NET: -1200 mL    27 Jan 2020 07:01  -  27 Jan 2020 08:44  --------------------------------------------------------  IN: 0 mL / OUT: 300 mL / NET: -300 mL        LABS:                        11.0   4.08  )-----------( 172      ( 27 Jan 2020 07:03 )             32.0     01-27    134<L>  |  99  |  11  ----------------------------<  53<L>  3.7   |  21<L>  |  0.75    Ca    9.2      27 Jan 2020 07:03  Phos  3.0     01-27  Mg     1.8     01-27          CAPILLARY BLOOD GLUCOSE            RADIOLOGY & ADDITIONAL STUDIES:

## 2020-01-27 NOTE — PHYSICAL THERAPY INITIAL EVALUATION ADULT - PERTINENT HX OF CURRENT PROBLEM, REHAB EVAL
89F with PMh of HTN, GERD, CAD with stent on ASA and plavix, s/p PPM, PSH of partial hysterectomy 50 years ago, haemorrhoidectomy 50 years ago presents with 1d history of lower abd pain with nausea, vomiting and diarrhoea. All of her symptoms started this morning, Lower abd pain described as sharp, constant, no radiation...Please refer to H&P on [place] for remaining.

## 2020-01-27 NOTE — DISCHARGE NOTE PROVIDER - NSDCCPCAREPLAN_GEN_ALL_CORE_FT
PRINCIPAL DISCHARGE DIAGNOSIS  Diagnosis: Nausea vomiting and diarrhea  Assessment and Plan of Treatment: Please resume all regular home medications  Please get plenty of rest, continue to ambulate several times per day, and drink adequate amounts of fluids.   Avoid lifting weights greater than 5-10 lbs until you follow-up with your surgeon, who will instruct you further regarding activity restrictions.  Avoid driving or operating heavy machinery while taking pain medications.   Please follow-up with your surgeon Dr. Waterman as needed. You can call his office [phone] as needed.        SECONDARY DISCHARGE DIAGNOSES  Diagnosis: Partial obstruction of small intestine  Assessment and Plan of Treatment:     Diagnosis: Dehydration  Assessment and Plan of Treatment:

## 2020-01-27 NOTE — DISCHARGE NOTE NURSING/CASE MANAGEMENT/SOCIAL WORK - PATIENT PORTAL LINK FT
You can access the FollowMyHealth Patient Portal offered by [facility] by registering at the following website: [email or web address]. By joining [facility]’s FollowMyHealth portal, you will also be able to view your health information using other applications (apps) compatible with our system.

## 2020-01-27 NOTE — PHYSICAL THERAPY INITIAL EVALUATION ADULT - GENERAL OBSERVATIONS, REHAB EVAL
Chart reviewed. IE Completed. Patient without complaints of pain at rest, agreeable to PT. Patient received semi-supine, NAD, +IV hep ALEXANDREA stiles cleared patient for treatment.

## 2020-01-27 NOTE — DISCHARGE NOTE PROVIDER - CARE PROVIDER_API CALL
Rafa Waterman)  Surgery  [address] [place] [address], [address]  [place], [place] [address]  Phone: [phone]  Fax: [phone]  Follow Up Time:

## 2020-01-27 NOTE — PHYSICAL THERAPY INITIAL EVALUATION ADULT - GAIT DEVIATIONS NOTED, PT EVAL
decreased step length/decreased kaley/fairly steady gait, no LOB/knee buckling noted, good negotiation through hallway obstacles without gait disturbances noted; increased time required with turning

## 2020-01-27 NOTE — DISCHARGE NOTE PROVIDER - NSDCMRMEDTOKEN_GEN_ALL_CORE_FT
amLODIPine 10 mg oral tablet: 1 tab(s) orally once a day  aspirin 81 mg oral tablet, chewable: 1 tab(s) orally once a day  metoprolol succinate 50 mg oral tablet, extended release: 1 tab(s) orally once a day  omeprazole 20 mg oral delayed release capsule: 1 cap(s) orally once a day amLODIPine 10 mg oral tablet: 1 tab(s) orally once a day  aspirin 81 mg oral tablet, chewable: 1 tab(s) orally once a day  metoprolol succinate 50 mg oral tablet, extended release: 1 tab(s) orally once a day  Protonix 40 mg oral delayed release tablet: 1 tab(s) orally once a day

## 2020-01-27 NOTE — PROGRESS NOTE ADULT - SUBJECTIVE AND OBJECTIVE BOX
89y old  Female who presents with a chief complaint of Abd pain     	  MEDICATIONS:  metoprolol succinate ER 50 milliGRAM(s) Oral daily        ondansetron Injectable 4 milliGRAM(s) IV Push every 6 hours PRN    pantoprazole  Injectable 40 milliGRAM(s) IV Push daily      enoxaparin Injectable 40 milliGRAM(s) SubCutaneous daily  sodium chloride 0.9%. 1000 milliLiter(s) IV Continuous <Continuous>      Complaint: Headache    Otherwise 12 point review of systems is normal.    PHYSICAL EXAM:      ICU Vital Signs Last 24 Hrs  T(C): 36.7 (27 Jan 2020 13:43), Max: 37 (27 Jan 2020 06:20)  T(F): 98 (27 Jan 2020 13:43), Max: 98.6 (27 Jan 2020 06:20)  HR: 69 (27 Jan 2020 13:43) (69 - 70)  BP: 177/81 (27 Jan 2020 13:43) (148/73 - 177/81)  BP(mean): --  ABP: --  ABP(mean): --  RR: 18 (27 Jan 2020 13:43) (17 - 18)  SpO2: 97% (27 Jan 2020 13:43) (95% - 98%)  LABS:	 	  CARDIAC MARKERS:                        11.0   4.08  )-----------( 172      ( 27 Jan 2020 07:03 )             32.0     01-27    134<L>  |  99  |  11  ----------------------------<  53<L>  3.7   |  21<L>  |  0.75    Ca    9.2      27 Jan 2020 07:03  Phos  3.0     01-27  Mg     1.8     01-27  89F with PMh of HTN, GERD, CAD with stent on ASA and plavix, s/p PPM, PSH of partial hysterectomy 50 years ago, haemorrhoidectomy 50 years ago presents with 1d history of lower abd pain with nausea, vomiting and diarrhoea likely due to gastroenteritis however with radiographic evidence of dilated small bowel possible partial small bowel obstruction. Now with       D/W [facility] about Htn meds.

## 2020-01-27 NOTE — DISCHARGE NOTE PROVIDER - HOSPITAL COURSE
89F with PMH of HTN, GERD, CAD with stent on ASA and plavix, s/p PPM, PSH of partial hysterectomy 50 years ago, haemorrhoidectomy 50 years ago presents with 1d history of lower abd pain with nausea, vomiting and diarrhoea. All of her symptoms started this morning, Lower abd pain described as sharp, constant, no radiation, nothing makes the pain better or worse, 7/10 in severity, associated with nausea and 10 episodes of nonbloody vomiting eventually turning ileus, 5 episodes of watery, nonbloody diarrhoea. Denies fever, chills. Passing flatus, having bowel movement. Unable to tolerate PO due to vomiting. Currently she is feeling a lot better, pain free. Last colonoscopy last year, normal. On 1/25 th epatient was admitted for enteritis and started on protonix and pepcid. On 1/26 the patient complained of non-specific burning and was seem by neurology for concern from shingles, neurology cleared the patient. On 1/27 the patient passed flatus and advanced to a regular diet which was tolerated. The patient is being discharged to home.

## 2020-01-27 NOTE — PHYSICAL THERAPY INITIAL EVALUATION ADULT - THERAPY FREQUENCY, PT EVAL
1-2 more PT sessions at [facility]; Patient educated on frequency of inpatient therapy at [facility], patient verbalized understanding.

## 2020-02-03 DIAGNOSIS — K52.9 NONINFECTIVE GASTROENTERITIS AND COLITIS, UNSPECIFIED: ICD-10-CM

## 2020-02-03 DIAGNOSIS — E86.0 DEHYDRATION: ICD-10-CM

## 2020-02-03 DIAGNOSIS — K56.600 PARTIAL INTESTINAL OBSTRUCTION, UNSPECIFIED AS TO CAUSE: ICD-10-CM

## 2020-02-03 DIAGNOSIS — I25.10 ATHEROSCLEROTIC HEART DISEASE OF NATIVE CORONARY ARTERY WITHOUT ANGINA PECTORIS: ICD-10-CM

## 2020-02-03 DIAGNOSIS — G62.9 POLYNEUROPATHY, UNSPECIFIED: ICD-10-CM

## 2020-02-03 DIAGNOSIS — Z95.0 PRESENCE OF CARDIAC PACEMAKER: ICD-10-CM

## 2020-02-03 DIAGNOSIS — F43.22 ADJUSTMENT DISORDER WITH ANXIETY: ICD-10-CM

## 2020-02-03 DIAGNOSIS — R19.7 DIARRHEA, UNSPECIFIED: ICD-10-CM

## 2020-02-03 DIAGNOSIS — Z91.041 RADIOGRAPHIC DYE ALLERGY STATUS: ICD-10-CM

## 2020-02-03 DIAGNOSIS — K21.9 GASTRO-ESOPHAGEAL REFLUX DISEASE WITHOUT ESOPHAGITIS: ICD-10-CM

## 2020-02-03 DIAGNOSIS — Z79.02 LONG TERM (CURRENT) USE OF ANTITHROMBOTICS/ANTIPLATELETS: ICD-10-CM

## 2020-02-03 DIAGNOSIS — K56.7 ILEUS, UNSPECIFIED: ICD-10-CM

## 2020-02-03 DIAGNOSIS — R11.2 NAUSEA WITH VOMITING, UNSPECIFIED: ICD-10-CM

## 2020-02-03 DIAGNOSIS — I10 ESSENTIAL (PRIMARY) HYPERTENSION: ICD-10-CM

## 2020-02-20 ENCOUNTER — INPATIENT (INPATIENT)
Facility: HOSPITAL | Age: 85
LOS: 4 days | Discharge: HOME | DRG: 66 | End: 2020-02-25
Attending: PSYCHIATRY & NEUROLOGY | Admitting: PSYCHIATRY & NEUROLOGY
Payer: MEDICARE

## 2020-02-20 VITALS
HEART RATE: 77 BPM | OXYGEN SATURATION: 98 % | RESPIRATION RATE: 18 BRPM | TEMPERATURE: 98 F | SYSTOLIC BLOOD PRESSURE: 168 MMHG | DIASTOLIC BLOOD PRESSURE: 68 MMHG

## 2020-02-20 DIAGNOSIS — Z95.0 PRESENCE OF CARDIAC PACEMAKER: Chronic | ICD-10-CM

## 2020-02-20 DIAGNOSIS — R63.8 OTHER SYMPTOMS AND SIGNS CONCERNING FOOD AND FLUID INTAKE: ICD-10-CM

## 2020-02-20 DIAGNOSIS — Z91.89 OTHER SPECIFIED PERSONAL RISK FACTORS, NOT ELSEWHERE CLASSIFIED: ICD-10-CM

## 2020-02-20 DIAGNOSIS — I10 ESSENTIAL (PRIMARY) HYPERTENSION: ICD-10-CM

## 2020-02-20 DIAGNOSIS — I25.10 ATHEROSCLEROTIC HEART DISEASE OF NATIVE CORONARY ARTERY WITHOUT ANGINA PECTORIS: ICD-10-CM

## 2020-02-20 DIAGNOSIS — K21.9 GASTRO-ESOPHAGEAL REFLUX DISEASE WITHOUT ESOPHAGITIS: ICD-10-CM

## 2020-02-20 LAB
ALBUMIN SERPL ELPH-MCNC: 4.5 G/DL — SIGNIFICANT CHANGE UP (ref 3.3–5)
ALP SERPL-CCNC: 80 U/L — SIGNIFICANT CHANGE UP (ref 40–120)
ALT FLD-CCNC: 10 U/L — SIGNIFICANT CHANGE UP (ref 10–45)
ANION GAP SERPL CALC-SCNC: 10 MMOL/L — SIGNIFICANT CHANGE UP (ref 5–17)
AST SERPL-CCNC: 19 U/L — SIGNIFICANT CHANGE UP (ref 10–40)
BASOPHILS # BLD AUTO: 0 K/UL — SIGNIFICANT CHANGE UP (ref 0–0.2)
BASOPHILS NFR BLD AUTO: 0 % — SIGNIFICANT CHANGE UP (ref 0–2)
BILIRUB SERPL-MCNC: 0.3 MG/DL — SIGNIFICANT CHANGE UP (ref 0.2–1.2)
BLD GP AB SCN SERPL QL: NEGATIVE — SIGNIFICANT CHANGE UP
BUN SERPL-MCNC: 9 MG/DL — SIGNIFICANT CHANGE UP (ref 7–23)
CALCIUM SERPL-MCNC: 10 MG/DL — SIGNIFICANT CHANGE UP (ref 8.4–10.5)
CHLORIDE SERPL-SCNC: 98 MMOL/L — SIGNIFICANT CHANGE UP (ref 96–108)
CO2 SERPL-SCNC: 28 MMOL/L — SIGNIFICANT CHANGE UP (ref 22–31)
CREAT SERPL-MCNC: 0.85 MG/DL — SIGNIFICANT CHANGE UP (ref 0.5–1.3)
EOSINOPHIL # BLD AUTO: 0.08 K/UL — SIGNIFICANT CHANGE UP (ref 0–0.5)
EOSINOPHIL NFR BLD AUTO: 1.7 % — SIGNIFICANT CHANGE UP (ref 0–6)
ETHANOL SERPL-MCNC: <10 MG/DL — SIGNIFICANT CHANGE UP (ref 0–10)
GLUCOSE SERPL-MCNC: 135 MG/DL — HIGH (ref 70–99)
HCT VFR BLD CALC: 38.8 % — SIGNIFICANT CHANGE UP (ref 34.5–45)
HGB BLD-MCNC: 12.6 G/DL — SIGNIFICANT CHANGE UP (ref 11.5–15.5)
INR BLD: 0.97 — SIGNIFICANT CHANGE UP (ref 0.88–1.16)
LYMPHOCYTES # BLD AUTO: 1.81 K/UL — SIGNIFICANT CHANGE UP (ref 1–3.3)
LYMPHOCYTES # BLD AUTO: 36.3 % — SIGNIFICANT CHANGE UP (ref 13–44)
MCHC RBC-ENTMCNC: 24.2 PG — LOW (ref 27–34)
MCHC RBC-ENTMCNC: 32.5 GM/DL — SIGNIFICANT CHANGE UP (ref 32–36)
MCV RBC AUTO: 74.5 FL — LOW (ref 80–100)
MONOCYTES # BLD AUTO: 0.04 K/UL — SIGNIFICANT CHANGE UP (ref 0–0.9)
MONOCYTES NFR BLD AUTO: 0.9 % — LOW (ref 2–14)
NEUTROPHILS # BLD AUTO: 2.12 K/UL — SIGNIFICANT CHANGE UP (ref 1.8–7.4)
NEUTROPHILS NFR BLD AUTO: 42.5 % — LOW (ref 43–77)
PLATELET # BLD AUTO: 210 K/UL — SIGNIFICANT CHANGE UP (ref 150–400)
POTASSIUM SERPL-MCNC: 4.4 MMOL/L — SIGNIFICANT CHANGE UP (ref 3.5–5.3)
POTASSIUM SERPL-SCNC: 4.4 MMOL/L — SIGNIFICANT CHANGE UP (ref 3.5–5.3)
PROT SERPL-MCNC: 7.9 G/DL — SIGNIFICANT CHANGE UP (ref 6–8.3)
PROTHROM AB SERPL-ACNC: 11 SEC — SIGNIFICANT CHANGE UP (ref 10–12.9)
RBC # BLD: 5.21 M/UL — HIGH (ref 3.8–5.2)
RBC # FLD: 16.9 % — HIGH (ref 10.3–14.5)
RH IG SCN BLD-IMP: POSITIVE — SIGNIFICANT CHANGE UP
SODIUM SERPL-SCNC: 136 MMOL/L — SIGNIFICANT CHANGE UP (ref 135–145)
TROPONIN T SERPL-MCNC: <0.01 NG/ML — SIGNIFICANT CHANGE UP (ref 0–0.01)
WBC # BLD: 4.98 K/UL — SIGNIFICANT CHANGE UP (ref 3.8–10.5)
WBC # FLD AUTO: 4.98 K/UL — SIGNIFICANT CHANGE UP (ref 3.8–10.5)

## 2020-02-20 PROCEDURE — 99223 1ST HOSP IP/OBS HIGH 75: CPT

## 2020-02-20 PROCEDURE — 93010 ELECTROCARDIOGRAM REPORT: CPT

## 2020-02-20 PROCEDURE — 99285 EMERGENCY DEPT VISIT HI MDM: CPT

## 2020-02-20 PROCEDURE — 70450 CT HEAD/BRAIN W/O DYE: CPT | Mod: 26

## 2020-02-20 PROCEDURE — 93306 TTE W/DOPPLER COMPLETE: CPT | Mod: 26

## 2020-02-20 RX ORDER — ASPIRIN/CALCIUM CARB/MAGNESIUM 324 MG
1 TABLET ORAL
Qty: 0 | Refills: 0 | DISCHARGE

## 2020-02-20 RX ORDER — METOPROLOL TARTRATE 50 MG
1 TABLET ORAL
Qty: 0 | Refills: 0 | DISCHARGE

## 2020-02-20 RX ORDER — ENOXAPARIN SODIUM 100 MG/ML
40 INJECTION SUBCUTANEOUS EVERY 24 HOURS
Refills: 0 | Status: DISCONTINUED | OUTPATIENT
Start: 2020-02-21 | End: 2020-02-25

## 2020-02-20 RX ORDER — ACETAMINOPHEN 500 MG
650 TABLET ORAL EVERY 6 HOURS
Refills: 0 | Status: DISCONTINUED | OUTPATIENT
Start: 2020-02-20 | End: 2020-02-22

## 2020-02-20 RX ORDER — MAGNESIUM HYDROXIDE 400 MG/1
30 TABLET, CHEWABLE ORAL DAILY
Refills: 0 | Status: DISCONTINUED | OUTPATIENT
Start: 2020-02-20 | End: 2020-02-25

## 2020-02-20 RX ORDER — PANTOPRAZOLE SODIUM 20 MG/1
40 TABLET, DELAYED RELEASE ORAL
Refills: 0 | Status: DISCONTINUED | OUTPATIENT
Start: 2020-02-20 | End: 2020-02-25

## 2020-02-20 RX ORDER — ENOXAPARIN SODIUM 100 MG/ML
30 INJECTION SUBCUTANEOUS ONCE
Refills: 0 | Status: DISCONTINUED | OUTPATIENT
Start: 2020-02-20 | End: 2020-02-20

## 2020-02-20 RX ORDER — ATORVASTATIN CALCIUM 80 MG/1
40 TABLET, FILM COATED ORAL AT BEDTIME
Refills: 0 | Status: DISCONTINUED | OUTPATIENT
Start: 2020-02-20 | End: 2020-02-25

## 2020-02-20 RX ORDER — CLOPIDOGREL BISULFATE 75 MG/1
75 TABLET, FILM COATED ORAL DAILY
Refills: 0 | Status: DISCONTINUED | OUTPATIENT
Start: 2020-02-20 | End: 2020-02-25

## 2020-02-20 RX ORDER — ASPIRIN/CALCIUM CARB/MAGNESIUM 324 MG
81 TABLET ORAL DAILY
Refills: 0 | Status: DISCONTINUED | OUTPATIENT
Start: 2020-02-20 | End: 2020-02-25

## 2020-02-20 RX ADMIN — ATORVASTATIN CALCIUM 40 MILLIGRAM(S): 80 TABLET, FILM COATED ORAL at 22:10

## 2020-02-20 RX ADMIN — CLOPIDOGREL BISULFATE 75 MILLIGRAM(S): 75 TABLET, FILM COATED ORAL at 12:12

## 2020-02-20 RX ADMIN — Medication 650 MILLIGRAM(S): at 15:54

## 2020-02-20 RX ADMIN — MAGNESIUM HYDROXIDE 30 MILLILITER(S): 400 TABLET, CHEWABLE ORAL at 22:10

## 2020-02-20 RX ADMIN — Medication 81 MILLIGRAM(S): at 12:12

## 2020-02-20 RX ADMIN — Medication 650 MILLIGRAM(S): at 16:37

## 2020-02-20 NOTE — H&P ADULT - NSHPREVIEWOFSYSTEMS_GEN_ALL_CORE
CONSTITUTIONAL: No fever, weight loss, or fatigue  EYES: No eye pain, visual disturbances, or discharge  ENMT:  Tinnitus, sinus pressure  NECK: No pain or stiffness  BREASTS: No pain, masses, or nipple discharge  RESPIRATORY: No cough, wheezing, chills or hemoptysis; No shortness of breath  CARDIOVASCULAR: No chest pain, palpitations, dizziness, or leg swelling  GASTROINTESTINAL: No abdominal or epigastric pain. No nausea, vomiting, or hematemesis; No diarrhea or constipation. No melena or hematochezia.  GENITOURINARY: No dysuria, frequency, hematuria, or incontinence  NEUROLOGICAL: No memory loss, numbness, but positive for left leg weakness and headache  SKIN: No itching, burning, rashes, or lesions   LYMPH NODES: No enlarged glands  ENDOCRINE: No heat or cold intolerance; No hair loss  MUSCULOSKELETAL: No joint pain or swelling; No muscle, back, or extremity pain  PSYCHIATRIC: No depression, anxiety, mood swings, or difficulty sleeping  HEME/LYMPH: No easy bruising, or bleeding gums  ALLERGY AND IMMUNOLOGIC: No hives or eczema

## 2020-02-20 NOTE — ED PROVIDER NOTE - OBJECTIVE STATEMENT
89F pmh HTN, GERD, asthma, CAD s/p stent p/w 1d HA, dizziness, LLE weakness onset approx 6am when had acute severe HA. Tried to stand, noted LLE weakness. No numbness, no prior episodes, no trauma.

## 2020-02-20 NOTE — H&P ADULT - NSHPPHYSICALEXAM_GEN_ALL_CORE
PHYSICAL EXAM:  General: In no acute distress, resting comfortably in bed  HEENT: NCAT, PERRL, EOMI, no conjunctival pallor or scleral icterus, MMM  Neck: Supple, no JVD  Respiratory: Clear to auscultation bilaterally with no wheezes, rales, or rhonchi appreciated  Cardiovascular: RRR, normal S1 and S2, no murmurs, rubs, or gallops appreciated  Vascular: 2+ radial and DP pulses  Abdomen: Soft, NT/ND. Bowel sounds present in all four quadrants with no guarding, rebound tenderness, or palpable masses  Extremities: Warm and well perfused. No clubbing, cyanosis, or edema noted  Skin: No gross skin abnormalities or rashes noted  Neuro:-   Mental Status:  AAOx3; speech is fluent with intact naming, repetition, and comprehension  - Cranial Nerves II-XII:    II:  PERRLA;  III, IV, VI:  EOMI, no nystagmus  V:  facial sensation is intact in the V1-V3 distribution bilaterally.  VII:  face is symmetric with normal eye closure and smile  VIII:  hearing is intact to finger rub  IX, X:  uvula is midline and soft palate rises symmetrically  XI:  head turning and shoulder shrug are intact bilaterally  XII:  tongue protrudes in the midline  - Motor:  strength is 5/5 throughout except for LLE 4/5 strength; normal muscle bulk and tone throughout; no pronator drift  - Sensory:  intact to light touch  - Coordination:  finger-nose-finger intact without dysmetria; rapid alternating hand movements intact  - Gait: Deferred

## 2020-02-20 NOTE — ED PROVIDER NOTE - PHYSICAL EXAMINATION
VITAL SIGNS: I have reviewed nursing notes and confirm.  CONSTITUTIONAL: Well-developed; well-nourished; in no acute distress.  SKIN: Skin is warm and dry, no acute rash.  HEAD: Normocephalic; atraumatic.  EYES:  EOM intact; conjunctiva and sclera clear.  ENT: No nasal discharge; airway clear.  NECK: Supple; Voluntary FROM  CARD: No rubs appreciated, Regular rate and rhythm.  RESP: No wheezes, no rales. No respiratory distress  ABD: Soft; non-distended; non-tender; no rebound or guarding  EXT: Normal ROM. No cyanosis or edema. no TTP  NEURO: Alert, oriented. PERRL, CNII-XII intact, BUE 5/5, LLE unable to support body weight when attempting to stand. 5/5 RLE. SILT  PSYCH: Cooperative, appropriate.

## 2020-02-20 NOTE — ED ADULT TRIAGE NOTE - PRO INTERPRETER NEED 2
Needs to have spirometry testing but for now will get her on albuterol to use intermittently.  Discussed the use of a spacer.  Cannot demonstrate how to do this as we did not have the appropriate tools.  She understood  And I told her that she could look up be was on youTube   English

## 2020-02-20 NOTE — ED ADULT NURSE NOTE - OBJECTIVE STATEMENT
pt BIBA by EMT complaining of headache, both ears buzzing, dizziness, nausea, both legs numbness started yesterday. pt presents dizziness started 2-3 months ago but worsening since yesterday, Stroke code is activated, denies vomiting, diarrhea, vision changes, fever/chills, trauma/injury recently at this time. pt BIBA by EMT complaining of headache, both ears buzzing, dizziness, nausea, both legs numbness started yesterday. pt presents dizziness started 2-3 months ago but worsening since yesterday, Stroke code is initiated, denies vomiting, diarrhea, vision changes, fever/chills, trauma/injury recently. pt is speaking in full sentences.

## 2020-02-20 NOTE — H&P ADULT - ASSESSMENT
Ms. Garcia is a 89-year-old female with a past medical history of HTN, GERD, Asthma, CAD status post stent and PPM who is presenting today with a 1-day history of headache, dizziness, sinus pressure, tinnitus, and left leg weakness. ED workup negative and admitted for further stroke workup.

## 2020-02-20 NOTE — H&P ADULT - HISTORY OF PRESENT ILLNESS
Ms. Garcia is a 89-year-old female with a past medical history of HTN, GERD, Asthma, CAD status post stent and PPM who is presenting today with a 1-day history of headache, dizziness, sinus pressure, tinnitus, and left leg weakness. She states that she attempted to sleep last night but could not and around 6AM she had acute onset severe headache that she describes as her heading exploding. It was at this time that she noticed some left leg weakness and decided to come to [facility]. Stroke code was called in the ED and NIHSS 0 on presentation. Head CT without evidence of infarct or hemorrhage. CTA/CTP not completed because she is allergic to iodine contrast. She was outside of the window for tPA. She was admitted for further stroke workup

## 2020-02-20 NOTE — CONSULT NOTE ADULT - ATTENDING COMMENTS
Patient seen today with stroke PAWAN Garner.    I was physically present for the key portions of the evaluation and management (E/M) service provided.  I agree with the above history, physical, and plan with the following additions/modifications     89/F with history per above, presenting with multiple fluctuating symptoms that have been chronic but with acute onset of posterior headache, dysequilibrium, and new left leg weakness this morning.  The new left leg weakness appears true weakness with mild dyscoordination.  Out of time window for tPA.  No LVO syndrome.   Patient has contrast allergy precluding CT angio, and has PPM which may be MRI compatible.   Will admit for stroke workup.  Regarding her other complaints, also needs assessment for possible unifying diagnosis such as Meneir's disease, given her progressive hearing loss, vertigo, and tinnitus.    Mau Kimbrough MD  Attending Neurointensivist

## 2020-02-20 NOTE — H&P ADULT - NSHPLABSRESULTS_GEN_ALL_CORE
LABS:                        12.6   4.98  )-----------( 210      ( 20 Feb 2020 11:57 )             38.8     02-20    136  |  98  |  9   ----------------------------<  135<H>  4.4   |  28  |  0.85    Ca    10.0      20 Feb 2020 11:57    TPro  7.9  /  Alb  4.5  /  TBili  0.3  /  DBili  x   /  AST  19  /  ALT  10  /  AlkPhos  80  02-20    PT/INR - ( 20 Feb 2020 11:57 )   PT: See note sec;   INR: See note               RADIOLOGY AND ADDITIONAL TESTS: Reviewed

## 2020-02-20 NOTE — ED ADULT TRIAGE NOTE - CHIEF COMPLAINT QUOTE
pt BIBA c/o worsening headache and numbness to both legs since 6 am today. as per EMS pt has headaches x 2 months, but reports " this headache is different and worst and the numbness is new" pt denies blurred vision, dizziness. able to speak clear and in full sentences. . pt upgraded to [facility], stroke code protocol initiated and pt taken to CT scan.

## 2020-02-20 NOTE — H&P ADULT - PROBLEM SELECTOR PLAN 3
History of hypertension on Metoprolol and Amlodipine    -Holding home anti-hypertensives to allow permissive hypertension for now

## 2020-02-20 NOTE — ED PROVIDER NOTE - CLINICAL SUMMARY MEDICAL DECISION MAKING FREE TEXT BOX
89F pmh HTN, GERD, asthma, CAD s/p stent p/w 1d HA, dizziness, LLE weakness onset approx 6am when had acute severe HA. Tried to stand, noted LLE weakness. No numbness, no prior episodes, no trauma. Exam noted for LLE focal weakness, no facial asymmetry. Stroke activated, consider migraine, electrolyte abnormality, no fall to suggest fracture/dislocation. Accepted neuro for further eval/imaging.

## 2020-02-20 NOTE — ED ADULT TRIAGE NOTE - ESI TRIAGE ACUITY LEVEL, MLM
2 I have personally performed a face to face diagnostic evaluation on this patient. I have reviewed the ACP note and agree with the history, exam and plan of care, except as noted.

## 2020-02-20 NOTE — ED ADULT NURSE NOTE - CHPI ED NUR SYMPTOMS NEG
no weakness/no vomiting/no change in level of consciousness/no confusion/no blurred vision/no fever/no loss of consciousness

## 2020-02-20 NOTE — H&P ADULT - PROBLEM SELECTOR PLAN 2
History of CAD status post stent but only on Aspirin    -Continue Aspirin 81mg QD  -Continue Atorvastatin 80mg QHS

## 2020-02-20 NOTE — CONSULT NOTE ADULT - SUBJECTIVE AND OBJECTIVE BOX
**STROKE CODE CONSULT NOTE**  **********INCOMPLETE******************  Last known well time/Time of onset of symptoms: 2/19/2020 unknown    HPI: 89y Female with PMHx of PMh of HTN, asthma, GERD, CAD with stent (ASA only, denies plavix), s/p PPM, PSH of partial hysterectomy 50 years ago, hemorrhoidectomy 50 years ago presents with complaints of 1 day dizziness, headache, left leg weakness. Patient endorses having worsening bilateral ear ringing over the past few months, and worsening right ear hearing. Yesterday morning patient endorses sinus pressure, headache, and dizziness. She went to bed around 5pm but didn't fall asleep. This morning around 6am she felt like her head was "exploding and had 2 heads" she also noticed her left leg was weaker and presented to [facility]. Stroke code was called, NIHSS 1. HCT showed no acute infarct or hemorrhage. CTA was negative for large vessel occlusion or stenosis.       T(C): 36.8 (02-20-20 @ 11:15), Max: 36.8 (02-20-20 @ 11:15)  HR: 70 (02-20-20 @ 11:45) (70 - 77)  BP: 168/79 (02-20-20 @ 11:45) (168/68 - 187/89)  RR: 18 (02-20-20 @ 11:45) (18 - 18)  SpO2: 99% (02-20-20 @ 11:45) (97% - 99%)    PAST MEDICAL & SURGICAL HISTORY:  Pacemaker  Hemorrhoid  Essential hypertension: Hypertension  Pulmonary embolism with infarction: Pulmonary embolism  Atherosclerosis of coronary artery: CAD (coronary artery disease)  Gastroesophageal reflux disease: GERD (gastroesophageal reflux disease)  Artificial pacemaker  Status post total hysterectomy: S/P hysterectomy      FAMILY HISTORY:  Problems influencing health status: No family history of coronary artery disease  No pertinent family history: No significant family history      MEDICATIONS  (STANDING):  aspirin enteric coated 81 milliGRAM(s) Oral daily  atorvastatin 40 milliGRAM(s) Oral at bedtime  clopidogrel Tablet 75 milliGRAM(s) Oral daily  enoxaparin Injectable 30 milliGRAM(s) SubCutaneous Once    MEDICATIONS  (PRN):    Allergies    iodinated radiocontrast agents (Anaphylaxis)    Intolerances      Vital Signs Last 24 Hrs  T(C): 36.8 (20 Feb 2020 11:15), Max: 36.8 (20 Feb 2020 11:15)  T(F): 98.3 (20 Feb 2020 11:15), Max: 98.3 (20 Feb 2020 11:15)  HR: 70 (20 Feb 2020 11:45) (70 - 77)  BP: 168/79 (20 Feb 2020 11:45) (168/68 - 187/89)  BP(mean): --  RR: 18 (20 Feb 2020 11:45) (18 - 18)  SpO2: 99% (20 Feb 2020 11:45) (97% - 99%)    Physical exam:  General: No acute distress, awake and alert  Neurologic:  -Mental status: Awake, alert, oriented to person, place, and time. Speech is fluent with intact naming, repetition, and comprehension, no dysarthria. Recent and remote memory intact. Follows commands.  -Cranial nerves:   II: Visual fields are full to confrontation.  III, IV, VI: Extraocular movements are intact without nystagmus. Pupils equally round and reactive to light  V:  Facial sensation V1-V3 equal and intact   VII: Face is symmetric with normal eye closure and smile  VIII: Hearing is decreased on right ear to finger rub.  Motor: Normal bulk and tone. No pronator drift. Strength bilateral upper extremity 5/5, right leg 5/5. Left leg 4/5 hip flexion  Sensation: Intact to light touch bilaterally. No neglect or extinction on double simultaneous testing.  Coordination: No dysmetria on finger-to-nos    NIHSS: 1  Fingerstick Blood Glucose: CAPILLARY BLOOD GLUCOSE      POCT Blood Glucose.: 140 mg/dL (20 Feb 2020 11:12)    LABS:                        12.6   4.98  )-----------( 210      ( 20 Feb 2020 11:57 )             38.8     02-20    136  |  98  |  9   ----------------------------<  135<H>  4.4   |  28  |  0.85    Ca    10.0      20 Feb 2020 11:57    TPro  7.9  /  Alb  4.5  /  TBili  0.3  /  DBili  x   /  AST  19  /  ALT  10  /  AlkPhos  80  02-20    PT/INR - ( 20 Feb 2020 11:57 )   PT: See note sec;   INR: See note           CARDIAC MARKERS ( 20 Feb 2020 11:57 )  x     / <0.01 ng/mL / x     / x     / x              RADIOLOGY & ADDITIONAL STUDIES:  < from: CT Brain Stroke Protocol (02.20.20 @ 11:26) >  FINDINGS:  Mild enlargement of the ventricles and cortical sulci consistent with age appropriate parenchymal volume loss.    There is no acute intracranial hemorrhage. There is no mass effect, midline shift or extra-axial collection. Scattered patchy areas of a ventricular white matter hypodensity consistent with chronic microvascular ischemia.    The gray white differentiation appears grossly preserved without acute transcortical infarction.    The bony windows demonstrates no acute/displaced fractures. The included paranasal sinuses and mastoid air cells are predominantly clear. The bilateral ocular lenses are absent consistent with prior cataract surgery.    IMPRESSION:    No acute intracranial hemorrhage, mass effect, or transcortical infarction.    The study was performed at 11:23 AM on 2/20/2020 and the above findings were discussed with KUN Garner at 11:26 AM.    -----------------------------------------------------------------------------------------------------------------  IV-tPA (Y/N):    no  Reason IV-tPA not given: out of window    ASSESSMENT/PLAN:          Rozina Garner  Neurology Stroke NP  [phone] **STROKE CODE CONSULT NOTE**  **********INCOMPLETE******************  Last known well time/Time of onset of symptoms: 2/19/2020 unknown    HPI: 89y Female with PMHx of PMh of HTN, asthma, GERD, CAD with stent (ASA only, denies plavix), s/p PPM, PSH of partial hysterectomy 50 years ago, pacemaker, hemorrhoidectomy 50 years ago presents with complaints of 1 day dizziness, headache, left leg weakness. Patient endorses having worsening bilateral ear ringing over the past few months, and worsening right ear hearing. Yesterday morning patient endorses sinus pressure, headache, and dizziness. She went to bed around 5pm but didn't fall asleep. This morning around 6am she felt like her head was "exploding and had 2 heads" she also noticed her left leg was weaker and presented to [facility]. Stroke code was called, NIHSS 1. HCT showed no acute infarct or hemorrhage. CTA/CTP not pursued as patient with allergy to iodine contrast. Tpa was not pursued as patient was out of window. Patient endorses that her left leg was her stronger leg and is concern about it's new onset of weakness. Patient states that she Patient denies acute onset of numbness, tingling, slurred speech, blurry vision, double vision.        T(C): 36.8 (02-20-20 @ 11:15), Max: 36.8 (02-20-20 @ 11:15)  HR: 70 (02-20-20 @ 11:45) (70 - 77)  BP: 168/79 (02-20-20 @ 11:45) (168/68 - 187/89)  RR: 18 (02-20-20 @ 11:45) (18 - 18)  SpO2: 99% (02-20-20 @ 11:45) (97% - 99%)    PAST MEDICAL & SURGICAL HISTORY:  Pacemaker  Hemorrhoid  Essential hypertension: Hypertension  Pulmonary embolism with infarction: Pulmonary embolism  Atherosclerosis of coronary artery: CAD (coronary artery disease)  Gastroesophageal reflux disease: GERD (gastroesophageal reflux disease)  Artificial pacemaker  Status post total hysterectomy: S/P hysterectomy      FAMILY HISTORY:  Problems influencing health status: No family history of coronary artery disease  No pertinent family history: No significant family history      MEDICATIONS  (STANDING):  aspirin enteric coated 81 milliGRAM(s) Oral daily  atorvastatin 40 milliGRAM(s) Oral at bedtime  clopidogrel Tablet 75 milliGRAM(s) Oral daily  enoxaparin Injectable 30 milliGRAM(s) SubCutaneous Once    MEDICATIONS  (PRN):    Allergies    iodinated radiocontrast agents (Anaphylaxis)    Intolerances      Vital Signs Last 24 Hrs  T(C): 36.8 (20 Feb 2020 11:15), Max: 36.8 (20 Feb 2020 11:15)  T(F): 98.3 (20 Feb 2020 11:15), Max: 98.3 (20 Feb 2020 11:15)  HR: 70 (20 Feb 2020 11:45) (70 - 77)  BP: 168/79 (20 Feb 2020 11:45) (168/68 - 187/89)  BP(mean): --  RR: 18 (20 Feb 2020 11:45) (18 - 18)  SpO2: 99% (20 Feb 2020 11:45) (97% - 99%)    Physical exam:  General: No acute distress, awake and alert  Neurologic:  -Mental status: Awake, alert, oriented to person, place, and time. Speech is fluent with intact naming, repetition, and comprehension, no dysarthria. Recent and remote memory intact. Follows commands.  -Cranial nerves:   II: Visual fields are full to confrontation.  III, IV, VI: Extraocular movements are intact without nystagmus. Pupils equally round and reactive to light  V:  Facial sensation V1-V3 equal and intact   VII: Face is symmetric with normal eye closure and smile  VIII: Hearing is decreased on right ear to finger rub.  Motor: Normal bulk and tone. No pronator drift. Strength bilateral upper extremity 5/5, right leg 5/5. Left leg 4/5 hip flexion  Sensation: Intact to light touch bilaterally. No neglect or extinction on double simultaneous testing.  Coordination: No dysmetria on finger-to-nos    NIHSS: 1  Fingerstick Blood Glucose: CAPILLARY BLOOD GLUCOSE      POCT Blood Glucose.: 140 mg/dL (20 Feb 2020 11:12)    LABS:                        12.6   4.98  )-----------( 210      ( 20 Feb 2020 11:57 )             38.8     02-20    136  |  98  |  9   ----------------------------<  135<H>  4.4   |  28  |  0.85    Ca    10.0      20 Feb 2020 11:57    TPro  7.9  /  Alb  4.5  /  TBili  0.3  /  DBili  x   /  AST  19  /  ALT  10  /  AlkPhos  80  02-20    PT/INR - ( 20 Feb 2020 11:57 )   PT: See note sec;   INR: See note           CARDIAC MARKERS ( 20 Feb 2020 11:57 )  x     / <0.01 ng/mL / x     / x     / x              RADIOLOGY & ADDITIONAL STUDIES:  < from: CT Brain Stroke Protocol (02.20.20 @ 11:26) >  FINDINGS:  Mild enlargement of the ventricles and cortical sulci consistent with age appropriate parenchymal volume loss.    There is no acute intracranial hemorrhage. There is no mass effect, midline shift or extra-axial collection. Scattered patchy areas of a ventricular white matter hypodensity consistent with chronic microvascular ischemia.    The gray white differentiation appears grossly preserved without acute transcortical infarction.    The bony windows demonstrates no acute/displaced fractures. The included paranasal sinuses and mastoid air cells are predominantly clear. The bilateral ocular lenses are absent consistent with prior cataract surgery.    IMPRESSION:    No acute intracranial hemorrhage, mass effect, or transcortical infarction.    The study was performed at 11:23 AM on 2/20/2020 and the above findings were discussed with KUN Garner at 11:26 AM.    -----------------------------------------------------------------------------------------------------------------  IV-tPA (Y/N):    no  Reason IV-tPA not given: out of window    ASSESSMENT/PLAN:  HPI: 89y Female with PMHx of PMh of HTN, asthma, GERD, CAD with stent (ASA only, denies plavix), s/p PPM, PSH of partial hysterectomy 50 years ago, hemorrhoidectomy 50 years ago presents with complaints of 1 day dizziness, headache, left leg weakness. Patient endorses having worsening bilateral ear ringing over the past few months, and worsening right ear hearing. Yesterday morning patient endorses sinus pressure, headache, and dizziness. She went to bed around 5pm but didn't fall asleep. This morning around 6am she felt like her head was "exploding and had 2 heads" she also noticed her left leg was weaker and presented to [facility]. Stroke code was called, NIHSS 1. HCT showed no acute infarct or hemorrhage. CTA/CTP not pursued as patient with allergy to iodine contrast. Tpa was not pursued as patient was out of window. Patient endorses that her left leg was her stronger leg and is concern about it's new onset of weakness. Patient denies acute onset of numbness, tingling, slurred speech, blurry vision, double vision.          Rozina Garner  Neurology Stroke NP  [phone] **STROKE CODE CONSULT NOTE**  Last known well time/Time of onset of symptoms: 2/19/2020 unknown    HPI: 89y Female with PMHx of PMh of HTN, asthma, GERD, CAD with stent (ASA only, denies plavix), s/p PPM, PSH of partial hysterectomy 50 years ago, pacemaker, hemorrhoidectomy 50 years ago presents with complaints of 1 day dizziness, headache, left leg weakness. Patient endorses having worsening bilateral ear ringing over the past few months, and worsening right ear hearing. Yesterday morning patient endorses sinus pressure, headache, and dizziness. She went to bed around 5pm but didn't fall asleep. This morning around 6am she felt like her head was "exploding and had 2 heads" she also noticed her left leg was weaker and presented to [facility]. Stroke code was called, NIHSS 1. HCT showed no acute infarct or hemorrhage. CTA/CTP not pursued as patient with allergy to iodine contrast. Tpa was not pursued as patient was out of window. Patient endorses that her left leg was her stronger leg and is concern about it's new onset of weakness. Patient states that she has previously gotten an MRI with her pacemaker and that she has the card. Patient denies acute onset of numbness, tingling, slurred speech, blurry vision, double vision.        T(C): 36.8 (02-20-20 @ 11:15), Max: 36.8 (02-20-20 @ 11:15)  HR: 70 (02-20-20 @ 11:45) (70 - 77)  BP: 168/79 (02-20-20 @ 11:45) (168/68 - 187/89)  RR: 18 (02-20-20 @ 11:45) (18 - 18)  SpO2: 99% (02-20-20 @ 11:45) (97% - 99%)    PAST MEDICAL & SURGICAL HISTORY:  Pacemaker  Hemorrhoid  Essential hypertension: Hypertension  Pulmonary embolism with infarction: Pulmonary embolism  Atherosclerosis of coronary artery: CAD (coronary artery disease)  Gastroesophageal reflux disease: GERD (gastroesophageal reflux disease)  Artificial pacemaker  Status post total hysterectomy: S/P hysterectomy      FAMILY HISTORY:  Problems influencing health status: No family history of coronary artery disease  No pertinent family history: No significant family history      MEDICATIONS  (STANDING):  aspirin enteric coated 81 milliGRAM(s) Oral daily  atorvastatin 40 milliGRAM(s) Oral at bedtime  clopidogrel Tablet 75 milliGRAM(s) Oral daily  enoxaparin Injectable 30 milliGRAM(s) SubCutaneous Once    MEDICATIONS  (PRN):    Allergies    iodinated radiocontrast agents (Anaphylaxis)    Intolerances      Vital Signs Last 24 Hrs  T(C): 36.8 (20 Feb 2020 11:15), Max: 36.8 (20 Feb 2020 11:15)  T(F): 98.3 (20 Feb 2020 11:15), Max: 98.3 (20 Feb 2020 11:15)  HR: 70 (20 Feb 2020 11:45) (70 - 77)  BP: 168/79 (20 Feb 2020 11:45) (168/68 - 187/89)  BP(mean): --  RR: 18 (20 Feb 2020 11:45) (18 - 18)  SpO2: 99% (20 Feb 2020 11:45) (97% - 99%)    Physical exam:  General: No acute distress, awake and alert  Neurologic:  -Mental status: Awake, alert, oriented to person, place, and time. Speech is fluent with intact naming, repetition, and comprehension, no dysarthria. Recent and remote memory intact. Follows commands.  -Cranial nerves:   II: Visual fields are full to confrontation.  III, IV, VI: Extraocular movements are intact without nystagmus. Pupils equally round and reactive to light  V:  Facial sensation V1-V3 equal and intact   VII: Face is symmetric with normal eye closure and smile  VIII: Hearing is decreased on right ear to finger rub.  Motor: Normal bulk and tone. No pronator drift. Strength bilateral upper extremity 5/5, right leg 5/5. Left leg 4/5 hip flexion  Sensation: Intact to light touch bilaterally. No neglect or extinction on double simultaneous testing.  Coordination: No dysmetria on finger-to-nos    NIHSS: 1  Fingerstick Blood Glucose: CAPILLARY BLOOD GLUCOSE      POCT Blood Glucose.: 140 mg/dL (20 Feb 2020 11:12)    LABS:                        12.6   4.98  )-----------( 210      ( 20 Feb 2020 11:57 )             38.8     02-20    136  |  98  |  9   ----------------------------<  135<H>  4.4   |  28  |  0.85    Ca    10.0      20 Feb 2020 11:57    TPro  7.9  /  Alb  4.5  /  TBili  0.3  /  DBili  x   /  AST  19  /  ALT  10  /  AlkPhos  80  02-20    PT/INR - ( 20 Feb 2020 11:57 )   PT: See note sec;   INR: See note           CARDIAC MARKERS ( 20 Feb 2020 11:57 )  x     / <0.01 ng/mL / x     / x     / x              RADIOLOGY & ADDITIONAL STUDIES:  < from: CT Brain Stroke Protocol (02.20.20 @ 11:26) >  FINDINGS:  Mild enlargement of the ventricles and cortical sulci consistent with age appropriate parenchymal volume loss.    There is no acute intracranial hemorrhage. There is no mass effect, midline shift or extra-axial collection. Scattered patchy areas of a ventricular white matter hypodensity consistent with chronic microvascular ischemia.    The gray white differentiation appears grossly preserved without acute transcortical infarction.    The bony windows demonstrates no acute/displaced fractures. The included paranasal sinuses and mastoid air cells are predominantly clear. The bilateral ocular lenses are absent consistent with prior cataract surgery.    IMPRESSION:    No acute intracranial hemorrhage, mass effect, or transcortical infarction.    The study was performed at 11:23 AM on 2/20/2020 and the above findings were discussed with KUN Garner at 11:26 AM.    -----------------------------------------------------------------------------------------------------------------  IV-tPA (Y/N):    no  Reason IV-tPA not given: out of window    ASSESSMENT/PLAN:  89y Female with PMHx of PMh of HTN, asthma, GERD, CAD with stent (ASA only, denies plavix), s/p PPM, PSH of partial hysterectomy 50 years ago, hemorrhoidectomy 50 years ago presents with complaints of 1 day dizziness, headache, left leg weakness. Patient with x 1 day sinus pressure, headache, and dizziness then this morning around 6am she felt like her head was "exploding and had 2 heads" she also noticed her left leg was weaker and presented to [facility]. Stroke code was called, NIHSS 1. HCT showed no acute infarct or hemorrhage. CTA/CTP not pursued as patient with allergy to iodine contrast. Tpa was not pursued as patient was out of window. Patient symptoms possibly stroke with need for further stroke up.    - Closely follow neuro checks every 2-4 hours   - Repeat HCT for acute changes in neuro exam  - Consult EP to look at PPM compatibility with MRI  - Obtain MRI Brain without contrast  - consider MRA brain and neck for blood vessel imaging vs carotid dopplers  - Obtain TTE  - Goal SBP < 200  - Bedside Dysphagia Screen  - PT/OT   - Obtain Hemoglobin A1C, Lipid Profile Panel, and TSH    Secondary Stroke Prevention Medication  - Start asa 81mg and plavix 75mg daily   - Start atorvastatin 80mg PO daily- cholesterol and stroke prevention   - Start lovenox SQ and SCDs for DVT prophylaxis                           Rozina Garner  Neurology Stroke NP  [phone]

## 2020-02-21 LAB
ANION GAP SERPL CALC-SCNC: 9 MMOL/L — SIGNIFICANT CHANGE UP (ref 5–17)
BASOPHILS # BLD AUTO: 0.04 K/UL — SIGNIFICANT CHANGE UP (ref 0–0.2)
BASOPHILS NFR BLD AUTO: 0.9 % — SIGNIFICANT CHANGE UP (ref 0–2)
BUN SERPL-MCNC: 9 MG/DL — SIGNIFICANT CHANGE UP (ref 7–23)
CALCIUM SERPL-MCNC: 9.4 MG/DL — SIGNIFICANT CHANGE UP (ref 8.4–10.5)
CHLORIDE SERPL-SCNC: 102 MMOL/L — SIGNIFICANT CHANGE UP (ref 96–108)
CHOLEST SERPL-MCNC: 168 MG/DL — SIGNIFICANT CHANGE UP (ref 10–199)
CO2 SERPL-SCNC: 26 MMOL/L — SIGNIFICANT CHANGE UP (ref 22–31)
CREAT SERPL-MCNC: 0.79 MG/DL — SIGNIFICANT CHANGE UP (ref 0.5–1.3)
EOSINOPHIL # BLD AUTO: 0.12 K/UL — SIGNIFICANT CHANGE UP (ref 0–0.5)
EOSINOPHIL NFR BLD AUTO: 2.7 % — SIGNIFICANT CHANGE UP (ref 0–6)
GLUCOSE SERPL-MCNC: 87 MG/DL — SIGNIFICANT CHANGE UP (ref 70–99)
HCT VFR BLD CALC: 35.1 % — SIGNIFICANT CHANGE UP (ref 34.5–45)
HDLC SERPL-MCNC: 62 MG/DL — SIGNIFICANT CHANGE UP
HGB BLD-MCNC: 12.1 G/DL — SIGNIFICANT CHANGE UP (ref 11.5–15.5)
IMM GRANULOCYTES NFR BLD AUTO: 0 % — SIGNIFICANT CHANGE UP (ref 0–1.5)
LIPID PNL WITH DIRECT LDL SERPL: 96 MG/DL — SIGNIFICANT CHANGE UP
LYMPHOCYTES # BLD AUTO: 1.77 K/UL — SIGNIFICANT CHANGE UP (ref 1–3.3)
LYMPHOCYTES # BLD AUTO: 39.8 % — SIGNIFICANT CHANGE UP (ref 13–44)
MAGNESIUM SERPL-MCNC: 2.4 MG/DL — SIGNIFICANT CHANGE UP (ref 1.6–2.6)
MCHC RBC-ENTMCNC: 24.8 PG — LOW (ref 27–34)
MCHC RBC-ENTMCNC: 34.5 GM/DL — SIGNIFICANT CHANGE UP (ref 32–36)
MCV RBC AUTO: 72.1 FL — LOW (ref 80–100)
MONOCYTES # BLD AUTO: 0.32 K/UL — SIGNIFICANT CHANGE UP (ref 0–0.9)
MONOCYTES NFR BLD AUTO: 7.2 % — SIGNIFICANT CHANGE UP (ref 2–14)
NEUTROPHILS # BLD AUTO: 2.2 K/UL — SIGNIFICANT CHANGE UP (ref 1.8–7.4)
NEUTROPHILS NFR BLD AUTO: 49.4 % — SIGNIFICANT CHANGE UP (ref 43–77)
NRBC # BLD: 0 /100 WBCS — SIGNIFICANT CHANGE UP (ref 0–0)
PLATELET # BLD AUTO: 180 K/UL — SIGNIFICANT CHANGE UP (ref 150–400)
POTASSIUM SERPL-MCNC: 4 MMOL/L — SIGNIFICANT CHANGE UP (ref 3.5–5.3)
POTASSIUM SERPL-SCNC: 4 MMOL/L — SIGNIFICANT CHANGE UP (ref 3.5–5.3)
RBC # BLD: 4.87 M/UL — SIGNIFICANT CHANGE UP (ref 3.8–5.2)
RBC # FLD: 16.8 % — HIGH (ref 10.3–14.5)
SODIUM SERPL-SCNC: 137 MMOL/L — SIGNIFICANT CHANGE UP (ref 135–145)
TOTAL CHOLESTEROL/HDL RATIO MEASUREMENT: 2.7 RATIO — LOW (ref 3.3–7.1)
TRIGL SERPL-MCNC: 51 MG/DL — SIGNIFICANT CHANGE UP (ref 10–149)
TSH SERPL-MCNC: 0.72 UIU/ML — SIGNIFICANT CHANGE UP (ref 0.35–4.94)
WBC # BLD: 4.45 K/UL — SIGNIFICANT CHANGE UP (ref 3.8–10.5)
WBC # FLD AUTO: 4.45 K/UL — SIGNIFICANT CHANGE UP (ref 3.8–10.5)

## 2020-02-21 PROCEDURE — 93308 TTE F-UP OR LMTD: CPT | Mod: 26

## 2020-02-21 PROCEDURE — 70450 CT HEAD/BRAIN W/O DYE: CPT | Mod: 26

## 2020-02-21 RX ORDER — CALCIUM CARBONATE 500(1250)
1 TABLET ORAL ONCE
Refills: 0 | Status: COMPLETED | OUTPATIENT
Start: 2020-02-21 | End: 2020-02-21

## 2020-02-21 RX ORDER — SENNA PLUS 8.6 MG/1
2 TABLET ORAL AT BEDTIME
Refills: 0 | Status: DISCONTINUED | OUTPATIENT
Start: 2020-02-21 | End: 2020-02-25

## 2020-02-21 RX ORDER — POLYETHYLENE GLYCOL 3350 17 G/17G
17 POWDER, FOR SOLUTION ORAL DAILY
Refills: 0 | Status: DISCONTINUED | OUTPATIENT
Start: 2020-02-21 | End: 2020-02-25

## 2020-02-21 RX ADMIN — CLOPIDOGREL BISULFATE 75 MILLIGRAM(S): 75 TABLET, FILM COATED ORAL at 13:30

## 2020-02-21 RX ADMIN — PANTOPRAZOLE SODIUM 40 MILLIGRAM(S): 20 TABLET, DELAYED RELEASE ORAL at 06:47

## 2020-02-21 RX ADMIN — Medication 1 TABLET(S): at 23:56

## 2020-02-21 RX ADMIN — Medication 10 MILLIGRAM(S): at 13:31

## 2020-02-21 RX ADMIN — Medication 81 MILLIGRAM(S): at 13:30

## 2020-02-21 RX ADMIN — ATORVASTATIN CALCIUM 40 MILLIGRAM(S): 80 TABLET, FILM COATED ORAL at 21:37

## 2020-02-21 RX ADMIN — SENNA PLUS 2 TABLET(S): 8.6 TABLET ORAL at 21:37

## 2020-02-21 RX ADMIN — ENOXAPARIN SODIUM 40 MILLIGRAM(S): 100 INJECTION SUBCUTANEOUS at 01:51

## 2020-02-21 RX ADMIN — POLYETHYLENE GLYCOL 3350 17 GRAM(S): 17 POWDER, FOR SOLUTION ORAL at 13:29

## 2020-02-21 NOTE — CONSULT NOTE ADULT - ASSESSMENT
per Neurology    Ms. Garcia is a 89-year-old female with a past medical history of HTN, GERD, Asthma, CAD status post stent and PPM who is presenting today with a 1-day history of headache, dizziness, sinus pressure, tinnitus, and left leg weakness. ED workup negative and admitted for further stroke workup.    Problem/Plan - 1:  ·  Problem: R/O Stroke.  Plan: Patient presenting with a 1 day history of headache, dizziness, tinnitus, and left leg weakness. NIHSS 1 in ED with negative CT and CTA deferred due to contrast allergy    -Continue neuro checks  -Consult EP to look at MRI compatability  -Follow up MRI  -Possible MRA brain and neck  -Follow up TTE  -Maintain SBP <200  -Bedside dysphagia  -PT evaluation  -OT evaluation  -Follow up HbA1C, Lipid, TSH  -Continue Aspirin 81mg QD  -Continue Atorvastatin 80mg QHS  -Continue Lovenox for DVT PPX.     Problem/Plan - 2:  ·  Problem: Atherosclerosis of coronary artery.  Plan: History of CAD status post stent but only on Aspirin    -Continue Aspirin 81mg QD  -Continue Atorvastatin 80mg QHS.     Problem/Plan - 3:  ·  Problem: Essential hypertension.  Plan: History of hypertension on Metoprolol and Amlodipine    -Holding home anti-hypertensives to allow permissive hypertension for now.     Problem/Plan - 4:  ·  Problem: Gastroesophageal reflux disease.  Plan: History of GERD on PPI at home    -Continue Protonix 40mg QD.     Problem/Plan - 5:  ·  Problem: Nutrition, metabolism, and development symptoms.  Plan: F: None  E: Replete PRN  N: DASH  GI PPX: Protonix  DVT PPX: Lovenox  Code: Full  Dispo: 7LA.

## 2020-02-21 NOTE — PROGRESS NOTE ADULT - SUBJECTIVE AND OBJECTIVE BOX
EPS Device interrogation    Indication: ?CVA    Device model: 	[employer] Accolade 		Implanting Physician:     Functioning Mode: DDD 70/130			    Underlying Rhythm: AP/    Pacemaker dependency:  yes    Battery status: 3.5 years  Interrogating parameters:   				RA			RV			LV    Sense:                                            2.2  mV               paced    Threshold:                               0.5 V @ 0.5 ms        0.8 V@ 0.5 ms    Pacing Impedance:                            663om              440  om    Shock Impedance:                                        n/a    Events/Alert:  none    Parameter change: 	none    Normal function PPM  Patient with a Pacemaker system that is from 2 different companies (generator and RA lead from [employer] and RV lead from [employer]). Individual component is MRI compatible.   Patient is pacemaker dependent, she will have to be reprogrammed to DOO and has to be on cardiac monitor during MRI.   Please call EP for device programming prior to pt going to MRI suite.   Discussed with Dr. Sullivan

## 2020-02-21 NOTE — OCCUPATIONAL THERAPY INITIAL EVALUATION ADULT - GENERAL OBSERVATIONS, REHAB EVAL
Patient cleared for OT evaluation by ALEXANDREA Jimenez. Patient received semi-blair, NAD, +IV heplock, +tele. Patient cleared for OT evaluation by ALEXANDREA Jimenez and for out of bed orders by Dr. Torrez. Patient received semi-blair, NAD, +IV heplock, +tele.

## 2020-02-21 NOTE — PHYSICAL THERAPY INITIAL EVALUATION ADULT - PERTINENT HX OF CURRENT PROBLEM, REHAB EVAL
Ms. Garcia is a 89-year-old female with a past medical history of HTN, GERD, Asthma, CAD status post stent and PPM who is presenting today with a 1-day history of headache, dizziness, sinus pressure, tinnitus, and left leg weakness. CT head neg for acute infarct

## 2020-02-21 NOTE — PHYSICAL THERAPY INITIAL EVALUATION ADULT - ADDITIONAL COMMENTS
Patient lives in apartment with 5 steps to enter. Ambulated with cane outdoors PTA. Patient has a grandson that helps her out with some shopping, chores. Denies recent falls

## 2020-02-21 NOTE — PHYSICAL THERAPY INITIAL EVALUATION ADULT - MODALITIES TREATMENT COMMENTS
smile symmetrical, tongue protrusion midline, opens/closes eyes intact bilaterally, raises eyebrows intact bilaterally, puffs cheeks intact bilaterally, SILT face, shoulder shrug WNL bilaterally

## 2020-02-21 NOTE — PROGRESS NOTE ADULT - PROBLEM SELECTOR PLAN 1
Patient presenting with a 1 day history of headache, dizziness, tinnitus, and left leg weakness. NIHSS 1 in ED with negative CT and CTA deferred due to contrast allergy    -Continue neuro checks  -Consult EP to look at MRI compatability  -Follow up MRI  -Possible MRA brain and neck  -Follow up TTE  -Maintain SBP <200  -Bedside dysphagia  -PT evaluation  -OT evaluation  -Follow up HbA1C, Lipid, TSH  -Continue Aspirin 81mg QD  -Continue Atorvastatin 80mg QHS  -Continue Lovenox for DVT PPX Patient presenting with a 1 day history of headache, dizziness, tinnitus, and left leg weakness. NIHSS 1 in ED with negative CT and CTA deferred due to contrast allergy. PT and OT recommending outpatient PT and OT. TTE normal. A1C and Lipid profile normal. Bedside dysphagia passed.    -Follow up MRI if pacemaker compatible, EP consulted  -Maintain SBP <200  -Continue Aspirin 81mg QD  -Continue Atorvastatin 80mg QHS  -Continue Lovenox for DVT PPX  -Repeat CT head today 2/21 normal

## 2020-02-21 NOTE — PHYSICAL THERAPY INITIAL EVALUATION ADULT - GENERAL OBSERVATIONS, REHAB EVAL
As per Dr. lobato medicine resident patient is clared for PT/OB and will states she will remove bedrest order. Received supine + telemetry, radha hartman at bedside, in NAD. Seen with Alea HERNANDEZ

## 2020-02-21 NOTE — CONSULT NOTE ADULT - SUBJECTIVE AND OBJECTIVE BOX
Patient is a 89y old  Female who presents with a chief complaint of R/O Stroke (21 Feb 2020 05:55)       HPI:  Ms. Garcia is a 89-year-old female with a past medical history of HTN, GERD, Asthma, CAD status post stent and PPM who is presenting today with a 1-day history of headache, dizziness, sinus pressure, tinnitus, and left leg weakness. She states that she attempted to sleep last night but could not and around 6AM she had acute onset severe headache that she describes as her heading exploding. It was at this time that she noticed some left leg weakness and decided to come to [facility]. Stroke code was called in the ED and NIHSS 0 on presentation. Head CT without evidence of infarct or hemorrhage. CTA/CTP not completed because she is allergic to iodine contrast. She was outside of the window for tPA. She was admitted for further stroke workup (20 Feb 2020 17:50)      PAST MEDICAL & SURGICAL HISTORY:  Pacemaker  Hemorrhoid  Essential hypertension: Hypertension  Pulmonary embolism with infarction: Pulmonary embolism  Atherosclerosis of coronary artery: CAD (coronary artery disease)  Gastroesophageal reflux disease: GERD (gastroesophageal reflux disease)  Artificial pacemaker  Status post total hysterectomy: S/P hysterectomy      MEDICATIONS  (STANDING):  aspirin enteric coated 81 milliGRAM(s) Oral daily  atorvastatin 40 milliGRAM(s) Oral at bedtime  clopidogrel Tablet 75 milliGRAM(s) Oral daily  enoxaparin Injectable 40 milliGRAM(s) SubCutaneous every 24 hours  pantoprazole    Tablet 40 milliGRAM(s) Oral before breakfast  polyethylene glycol 3350 17 Gram(s) Oral daily  senna 2 Tablet(s) Oral at bedtime    MEDICATIONS  (PRN):  acetaminophen    Suspension .. 650 milliGRAM(s) Oral every 6 hours PRN Temp greater or equal to 38C (100.4F), Mild Pain (1 - 3), Moderate Pain (4 - 6), Severe Pain (7 - 10)  magnesium hydroxide Suspension 30 milliLiter(s) Oral daily PRN Constipation      Social History:           -  Occupation: X           -  Home Living Status: lives alone in a ground floor apartment, 3-4 steps to enter, has a grandson who lives 3-4 blocks away           -  Prior Home Care Services:  X    Baseline Functional Level Prior to Admission:           - ADL's: states she is independent, goes to [employer] 3 times a week           - ambulates with a cane    FAMILY HISTORY:  Problems influencing health status: No family history of coronary artery disease      CBC Full  -  ( 21 Feb 2020 07:57 )  WBC Count : 4.45 K/uL  RBC Count : 4.87 M/uL  Hemoglobin : 12.1 g/dL  Hematocrit : 35.1 %  Platelet Count - Automated : 180 K/uL  Mean Cell Volume : 72.1 fl  Mean Cell Hemoglobin : 24.8 pg  Mean Cell Hemoglobin Concentration : 34.5 gm/dL  Auto Neutrophil # : 2.20 K/uL  Auto Lymphocyte # : 1.77 K/uL  Auto Monocyte # : 0.32 K/uL  Auto Eosinophil # : 0.12 K/uL  Auto Basophil # : 0.04 K/uL  Auto Neutrophil % : 49.4 %  Auto Lymphocyte % : 39.8 %  Auto Monocyte % : 7.2 %  Auto Eosinophil % : 2.7 %  Auto Basophil % : 0.9 %      02-20    136  |  98  |  9   ----------------------------<  135<H>  4.4   |  28  |  0.85    Ca    10.0      20 Feb 2020 11:57    TPro  7.9  /  Alb  4.5  /  TBili  0.3  /  DBili  x   /  AST  19  /  ALT  10  /  AlkPhos  80  02-20            Radiology:    < from: CT Brain Stroke Protocol (02.20.20 @ 11:26) >    EXAM:  CT BRAIN STROKE PROTOCOL                          PROCEDURE DATE:  02/20/2020          INTERPRETATION:  Tena MONTGOMERY MD, have reviewed the images and the report and agree with the findings.        PROCEDURE: CT head without intravenous contrast    INDICATION: Dizziness, headache, and left leg heaviness. Rule out CVA.    TECHNIQUE: Multiple axial images were obtained at 5 mm intervals from the skull base to the vertex. Sagittal and coronal reformatted images were created from the axial data set. The images were reviewed in brain and bone windows.    COMPARISON: CT head from 7/31/2019.    FINDINGS:  Mild enlargement of the ventricles and cortical sulci consistent with age appropriate parenchymal volume loss.    There is no acute intracranial hemorrhage. There is no mass effect, midline shift or extra-axial collection. Scattered patchy areas of a ventricular white matter hypodensity consistent with chronic microvascular ischemia.    The gray white differentiation appears grossly preserved without acute transcortical infarction.    The bony windows demonstrates no acute/displaced fractures. The included paranasal sinuses and mastoid air cells are predominantly clear. The bilateral ocular lenses are absent consistent with prior cataract surgery.    IMPRESSION:    No acute intracranial hemorrhage, mass effect, or transcortical infarction.            Vital Signs Last 24 Hrs  T(C): 36.8 (21 Feb 2020 06:11), Max: 36.8 (20 Feb 2020 11:15)  T(F): 98.2 (21 Feb 2020 06:11), Max: 98.3 (20 Feb 2020 11:15)  HR: 70 (21 Feb 2020 05:13) (64 - 77)  BP: 162/76 (21 Feb 2020 05:13) (134/61 - 187/89)  BP(mean): 109 (21 Feb 2020 05:13) (88 - 119)  RR: 18 (21 Feb 2020 05:13) (16 - 18)  SpO2: 98% (21 Feb 2020 05:13) (96% - 99%)    REVIEW OF SYSTEMS:    CONSTITUTIONAL: No fever, weight loss, or fatigue  EYES: No eye pain, visual disturbances, or discharge  ENMT:  No difficulty hearing, tinnitus, vertigo; No sinus or throat pain  NECK: No pain or stiffness  BREASTS: No pain, masses, or nipple discharge  RESPIRATORY: No cough, wheezing, chills or hemoptysis; No shortness of breath  CARDIOVASCULAR: No chest pain, palpitations, dizziness, or leg swelling  GASTROINTESTINAL: No abdominal or epigastric pain. No nausea, vomiting, or hematemesis; No diarrhea or constipation. No melena or hematochezia.  GENITOURINARY: No dysuria, frequency, hematuria, or incontinence  NEUROLOGICAL: dizziness, left leg weakness  SKIN: No itching, burning, rashes, or lesions   LYMPH NODES: No enlarged glands  ENDOCRINE: No heat or cold intolerance; No hair loss  MUSCULOSKELETAL: No joint pain or swelling; No muscle, back, or extremity pain  PSYCHIATRIC: No depression, anxiety, mood swings, or difficulty sleeping  HEME/LYMPH: No easy bruising, or bleeding gums  ALLERGY AND IMMUNOLOGIC: No hives or eczema  VASCULAR: no swelling, erythema        Physical Exam: faril 89 yo AA woman lying in semi Lewis's position, no acute complaints    Head: normocephalic, atraumatic    Eyes: PERRLA, EOMI, no nystagmus, sclera anicteric    ENT: nasal discharge, uvula midline, no oropharyngeal erythema/exudate    Neck: supple, negative JVD, negative carotid bruits, no thyromegaly    Chest: CTA bilaterally, neg wheeze/ rhonchi/ rales/ crackles/ egophany    Cardiovascular: regular rate and rhythm, neg murmurs/rubs/gallops    Abdomen: soft, non distended, non tender to palpation in all 4 quadrants, negative rebound/guarding, normal bowel sounds    Extremities: WWP, neg cyanosis/clubbing/edema, negative calf tenderness to palpation, negative Hardik's sign      :     Neurologic Exam:    Alert and oriented to person, place, date/year, speech fluent w/o dysarthria, recent and remote memory intact, repetition intact, comprehension intact    Cranial Nerves:     II:                       pupils equal, round and reactive to light, visual fields intact   III/ IV/VI:            extraocular movements intact, neg nystagmus, neg ptosis  V:                       facial sensation intact, V1-3 normal  VII:                     face symmetric, no droop, normal eye closure and smile  VIII:                    hearing intact to finger rub bilaterally  IX/ X:                 soft palate rise symmetrical  XI:                      head turning, shoulder shrug normal  XII:                     tongue midline    Motor Exam:    Upper Extremities:     RIght:   no focal weakness               negative drift    Left :   no focal weakness               negative drift    Lower Extremities:                 Right:   no focal weakness                 Left:      no focal weakness                 Sensory:    intact to LT/PP in all UE/LE dermatomes                     DTR:             = biceps/     triceps/     brachioradialis                      = patella/   medial hamstring/ankle                      neg clonus                      neg Babinski                        Finger to Nose:  wnl    Heel to Shin:  wnl    Rapid Alternating movements:  wnl    Joint Position Sense:  intact    Romberg:  not tested    Tandem Walking:  not tested    Gait:  not tested        PM&R Impression:    1) r/o acute stroke  2) deconditioned  3) no focal weakness        Recommendations:    1) Physical therapy focusing on therapeutic exercises, bed mobility/transfer out of bed evaluation, progressive ambulation with assistive devices prn.    2) Anticipated Disposition Plan/Recs: pending functional progress, MRI brain

## 2020-02-21 NOTE — OCCUPATIONAL THERAPY INITIAL EVALUATION ADULT - RANGE OF MOTION EXAMINATION, UPPER EXTREMITY
left shoulder flexion limited to ~100 degrees./bilateral UE Active ROM was WFL  (within functional limits)

## 2020-02-21 NOTE — OCCUPATIONAL THERAPY INITIAL EVALUATION ADULT - VISUAL ACUITY
Prescription already approved.    patient wears glasses at all times. Did not currently have them on during this evaluation.

## 2020-02-21 NOTE — PHYSICAL THERAPY INITIAL EVALUATION ADULT - SENSATION HOT/COLD, RLE, OT EVAL
Patient intact to light touch but reports feeling of numbness lower leg/veronica as PTA/within normal limits

## 2020-02-21 NOTE — PROGRESS NOTE ADULT - SUBJECTIVE AND OBJECTIVE BOX
INCOMPLETE  OVERNIGHT EVENTS:    SUBJECTIVE: Patient seen and examined at the bedside.     Vital Signs Last 12 Hrs  T(F): 97.7 (02-21-20 @ 02:20), Max: 97.7 (02-20-20 @ 21:38)  HR: 70 (02-21-20 @ 05:13) (70 - 70)  BP: 162/76 (02-21-20 @ 05:13) (134/61 - 162/76)  BP(mean): 109 (02-21-20 @ 05:13) (88 - 109)  RR: 18 (02-21-20 @ 05:13) (16 - 18)  SpO2: 98% (02-21-20 @ 05:13) (98% - 99%)  I&O's Summary      PHYSICAL EXAM:  General: In no acute distress, resting comfortably in bed  HEENT: NCAT, PERRL, EOMI, no conjunctival pallor or scleral icterus, MMM  Neck: Supple, no JVD  Respiratory: Clear to auscultation bilaterally with no wheezes, rales, or rhonchi appreciated  Cardiovascular: RRR, normal S1 and S2, no murmurs, rubs, or gallops appreciated  Vascular: 2+ radial and DP pulses  Abdomen: Soft, NT/ND. Bowel sounds present in all four quadrants with no guarding, rebound tenderness, or palpable masses  Extremities: Warm and well perfused. No clubbing, cyanosis, or edema noted  Skin: No gross skin abnormalities or rashes noted  Neuro:  - Mental Status:  AAOx3; speech is fluent with intact naming, repetition, and comprehension  - Cranial Nerves II-XII:    II:  PERRLA; visual fields are full to confrontation  III, IV, VI:  EOMI, no nystagmus  V:  facial sensation is intact in the V1-V3 distribution bilaterally.  VII:  face is symmetric with normal eye closure and smile  VIII:  hearing is intact to finger rub  IX, X:  uvula is midline and soft palate rises symmetrically  XI:  head turning and shoulder shrug are intact bilaterally  XII:  tongue protrudes in the midline  - Motor:  strength is 5/5 throughout; normal muscle bulk and tone throughout; no pronator drift  - Reflexes:  2+ and symmetric at the biceps, triceps, brachioradialis, knees, and ankles;  plantar reflexes downgoing bilaterally  - Sensory:  intact to light touch, pin prick, vibration, and joint-position sense throughout  - Coordination:  finger-nose-finger and heel-knee-shin intact without dysmetria; rapid alternating hand movements intact  - Gait:  normal steps, base, arm swing, and turning; heel and toe walking are normal; tandem gait is normal; Romberg testing is negative      LABS:                        12.6   4.98  )-----------( 210      ( 20 Feb 2020 11:57 )             38.8     02-20    136  |  98  |  9   ----------------------------<  135<H>  4.4   |  28  |  0.85    Ca    10.0      20 Feb 2020 11:57    TPro  7.9  /  Alb  4.5  /  TBili  0.3  /  DBili  x   /  AST  19  /  ALT  10  /  AlkPhos  80  02-20    PT/INR - ( 20 Feb 2020 11:57 )   PT: See note sec;   INR: See note               RADIOLOGY & ADDITIONAL TESTS: Reviewed.    MEDICATIONS  (STANDING):  aspirin enteric coated 81 milliGRAM(s) Oral daily  atorvastatin 40 milliGRAM(s) Oral at bedtime  clopidogrel Tablet 75 milliGRAM(s) Oral daily  enoxaparin Injectable 40 milliGRAM(s) SubCutaneous every 24 hours  pantoprazole    Tablet 40 milliGRAM(s) Oral before breakfast    MEDICATIONS  (PRN):  acetaminophen    Suspension .. 650 milliGRAM(s) Oral every 6 hours PRN Temp greater or equal to 38C (100.4F), Mild Pain (1 - 3), Moderate Pain (4 - 6), Severe Pain (7 - 10)  magnesium hydroxide Suspension 30 milliLiter(s) Oral daily PRN Constipation      Allergies    iodinated radiocontrast agents (Anaphylaxis)    Intolerances INCOMPLETE  OVERNIGHT EVENTS: No acute events overnight.    SUBJECTIVE: Patient seen and examined at the bedside. She says she still has ear ringing and remains dizzy but her headache is mildly improved. She is concerned about having her entire body immersed into the MRI because she is claustrophobic.    Vital Signs Last 12 Hrs  T(F): 97.7 (02-21-20 @ 02:20), Max: 97.7 (02-20-20 @ 21:38)  HR: 70 (02-21-20 @ 05:13) (70 - 70)  BP: 162/76 (02-21-20 @ 05:13) (134/61 - 162/76)  BP(mean): 109 (02-21-20 @ 05:13) (88 - 109)  RR: 18 (02-21-20 @ 05:13) (16 - 18)  SpO2: 98% (02-21-20 @ 05:13) (98% - 99%)  I&O's Summary      PHYSICAL EXAM:  General: In no acute distress, resting comfortably in bed  HEENT: NCAT, PERRL, EOMI, no conjunctival pallor or scleral icterus, MMM  Neck: Supple, no JVD  Respiratory: Clear to auscultation bilaterally  Cardiovascular: RRR, normal S1 and S2, no murmurs, rubs, or gallops  Vascular: 2+ radial and DP pulses  Abdomen: Soft, NT/ND. Bowel sounds present in all four quadrants  Extremities: Warm and well perfused. No clubbing, cyanosis, or edema noted  Skin: No gross skin abnormalities or rashes noted  Neuro:  - Mental Status:  AAOx3; speech is fluent with intact naming, repetition, and comprehension  - Cranial Nerves II-XII:    II:  PERRLA; visual fields are full to confrontation  III, IV, VI:  EOMI, no nystagmus  V:  facial sensation is intact in the V1-V3 distribution bilaterally.  VII:  face is symmetric with normal eye closure and smile  VIII:  hearing is intact to finger rub  IX, X:  uvula is midline and soft palate rises symmetrically  XI:  head turning and shoulder shrug are intact bilaterally  XII:  tongue protrudes in the midline  - Motor:  strength is 5/5 throughout except for LLE strength 4/5; normal muscle bulk and tone throughout; no pronator drift  - Sensory:  intact to light touch  - Gait: Deferred      LABS:                        12.6   4.98  )-----------( 210      ( 20 Feb 2020 11:57 )             38.8     02-20    136  |  98  |  9   ----------------------------<  135<H>  4.4   |  28  |  0.85    Ca    10.0      20 Feb 2020 11:57    TPro  7.9  /  Alb  4.5  /  TBili  0.3  /  DBili  x   /  AST  19  /  ALT  10  /  AlkPhos  80  02-20    PT/INR - ( 20 Feb 2020 11:57 )   PT: See note sec;   INR: See note               RADIOLOGY & ADDITIONAL TESTS: Reviewed.    MEDICATIONS  (STANDING):  aspirin enteric coated 81 milliGRAM(s) Oral daily  atorvastatin 40 milliGRAM(s) Oral at bedtime  clopidogrel Tablet 75 milliGRAM(s) Oral daily  enoxaparin Injectable 40 milliGRAM(s) SubCutaneous every 24 hours  pantoprazole    Tablet 40 milliGRAM(s) Oral before breakfast    MEDICATIONS  (PRN):  acetaminophen    Suspension .. 650 milliGRAM(s) Oral every 6 hours PRN Temp greater or equal to 38C (100.4F), Mild Pain (1 - 3), Moderate Pain (4 - 6), Severe Pain (7 - 10)  magnesium hydroxide Suspension 30 milliLiter(s) Oral daily PRN Constipation      Allergies    iodinated radiocontrast agents (Anaphylaxis)    Intolerances OVERNIGHT EVENTS: No acute events overnight.    SUBJECTIVE: Patient seen and examined at the bedside. She says she still has ear ringing and remains dizzy but her headache is mildly improved. She is concerned about having her entire body immersed into the MRI because she is claustrophobic.    Vital Signs Last 12 Hrs  T(F): 97.7 (02-21-20 @ 02:20), Max: 97.7 (02-20-20 @ 21:38)  HR: 70 (02-21-20 @ 05:13) (70 - 70)  BP: 162/76 (02-21-20 @ 05:13) (134/61 - 162/76)  BP(mean): 109 (02-21-20 @ 05:13) (88 - 109)  RR: 18 (02-21-20 @ 05:13) (16 - 18)  SpO2: 98% (02-21-20 @ 05:13) (98% - 99%)  I&O's Summary      PHYSICAL EXAM:  General: In no acute distress, resting comfortably in bed  HEENT: NCAT, PERRL, EOMI, no conjunctival pallor or scleral icterus, MMM  Neck: Supple, no JVD  Respiratory: Clear to auscultation bilaterally  Cardiovascular: RRR, normal S1 and S2, no murmurs, rubs, or gallops  Vascular: 2+ radial and DP pulses  Abdomen: Soft, NT/ND. Bowel sounds present in all four quadrants  Extremities: Warm and well perfused. No clubbing, cyanosis, or edema noted  Skin: No gross skin abnormalities or rashes noted  Neuro:  - Mental Status:  AAOx3; speech is fluent with intact naming, repetition, and comprehension  - Cranial Nerves II-XII:    II:  PERRLA; visual fields are full to confrontation  III, IV, VI:  EOMI, no nystagmus  V:  facial sensation is intact in the V1-V3 distribution bilaterally.  VII:  face is symmetric with normal eye closure and smile  VIII:  hearing is intact to finger rub  IX, X:  uvula is midline and soft palate rises symmetrically  XI:  head turning and shoulder shrug are intact bilaterally  XII:  tongue protrudes in the midline  - Motor:  strength is 5/5 throughout except for LLE strength 4/5; normal muscle bulk and tone throughout; no pronator drift  - Sensory:  intact to light touch  - Gait: Deferred      LABS:                        12.6   4.98  )-----------( 210      ( 20 Feb 2020 11:57 )             38.8     02-20    136  |  98  |  9   ----------------------------<  135<H>  4.4   |  28  |  0.85    Ca    10.0      20 Feb 2020 11:57    TPro  7.9  /  Alb  4.5  /  TBili  0.3  /  DBili  x   /  AST  19  /  ALT  10  /  AlkPhos  80  02-20    PT/INR - ( 20 Feb 2020 11:57 )   PT: See note sec;   INR: See note               RADIOLOGY & ADDITIONAL TESTS: Reviewed.    MEDICATIONS  (STANDING):  aspirin enteric coated 81 milliGRAM(s) Oral daily  atorvastatin 40 milliGRAM(s) Oral at bedtime  clopidogrel Tablet 75 milliGRAM(s) Oral daily  enoxaparin Injectable 40 milliGRAM(s) SubCutaneous every 24 hours  pantoprazole    Tablet 40 milliGRAM(s) Oral before breakfast    MEDICATIONS  (PRN):  acetaminophen    Suspension .. 650 milliGRAM(s) Oral every 6 hours PRN Temp greater or equal to 38C (100.4F), Mild Pain (1 - 3), Moderate Pain (4 - 6), Severe Pain (7 - 10)  magnesium hydroxide Suspension 30 milliLiter(s) Oral daily PRN Constipation      Allergies    iodinated radiocontrast agents (Anaphylaxis)    Intolerances

## 2020-02-21 NOTE — PHYSICAL THERAPY INITIAL EVALUATION ADULT - SENSATION HOT/COLD, LLE, OT EVAL
Patient intact to light touch but reports feeling of numbness lower leg/foot as PTA/within normal limits

## 2020-02-22 LAB
ANION GAP SERPL CALC-SCNC: 11 MMOL/L — SIGNIFICANT CHANGE UP (ref 5–17)
BASOPHILS # BLD AUTO: 0.03 K/UL — SIGNIFICANT CHANGE UP (ref 0–0.2)
BASOPHILS NFR BLD AUTO: 0.7 % — SIGNIFICANT CHANGE UP (ref 0–2)
BUN SERPL-MCNC: 9 MG/DL — SIGNIFICANT CHANGE UP (ref 7–23)
CALCIUM SERPL-MCNC: 9.7 MG/DL — SIGNIFICANT CHANGE UP (ref 8.4–10.5)
CHLORIDE SERPL-SCNC: 100 MMOL/L — SIGNIFICANT CHANGE UP (ref 96–108)
CO2 SERPL-SCNC: 25 MMOL/L — SIGNIFICANT CHANGE UP (ref 22–31)
CREAT SERPL-MCNC: 0.82 MG/DL — SIGNIFICANT CHANGE UP (ref 0.5–1.3)
EOSINOPHIL # BLD AUTO: 0.19 K/UL — SIGNIFICANT CHANGE UP (ref 0–0.5)
EOSINOPHIL NFR BLD AUTO: 4.2 % — SIGNIFICANT CHANGE UP (ref 0–6)
GLUCOSE SERPL-MCNC: 86 MG/DL — SIGNIFICANT CHANGE UP (ref 70–99)
HCT VFR BLD CALC: 34.8 % — SIGNIFICANT CHANGE UP (ref 34.5–45)
HGB BLD-MCNC: 11.9 G/DL — SIGNIFICANT CHANGE UP (ref 11.5–15.5)
IMM GRANULOCYTES NFR BLD AUTO: 0.2 % — SIGNIFICANT CHANGE UP (ref 0–1.5)
LYMPHOCYTES # BLD AUTO: 1.85 K/UL — SIGNIFICANT CHANGE UP (ref 1–3.3)
LYMPHOCYTES # BLD AUTO: 40.7 % — SIGNIFICANT CHANGE UP (ref 13–44)
MAGNESIUM SERPL-MCNC: 2.6 MG/DL — SIGNIFICANT CHANGE UP (ref 1.6–2.6)
MCHC RBC-ENTMCNC: 24.8 PG — LOW (ref 27–34)
MCHC RBC-ENTMCNC: 34.2 GM/DL — SIGNIFICANT CHANGE UP (ref 32–36)
MCV RBC AUTO: 72.5 FL — LOW (ref 80–100)
MONOCYTES # BLD AUTO: 0.3 K/UL — SIGNIFICANT CHANGE UP (ref 0–0.9)
MONOCYTES NFR BLD AUTO: 6.6 % — SIGNIFICANT CHANGE UP (ref 2–14)
NEUTROPHILS # BLD AUTO: 2.17 K/UL — SIGNIFICANT CHANGE UP (ref 1.8–7.4)
NEUTROPHILS NFR BLD AUTO: 47.6 % — SIGNIFICANT CHANGE UP (ref 43–77)
NRBC # BLD: 0 /100 WBCS — SIGNIFICANT CHANGE UP (ref 0–0)
PLATELET # BLD AUTO: 184 K/UL — SIGNIFICANT CHANGE UP (ref 150–400)
POTASSIUM SERPL-MCNC: 4.3 MMOL/L — SIGNIFICANT CHANGE UP (ref 3.5–5.3)
POTASSIUM SERPL-SCNC: 4.3 MMOL/L — SIGNIFICANT CHANGE UP (ref 3.5–5.3)
RBC # BLD: 4.8 M/UL — SIGNIFICANT CHANGE UP (ref 3.8–5.2)
RBC # FLD: 16.6 % — HIGH (ref 10.3–14.5)
SODIUM SERPL-SCNC: 136 MMOL/L — SIGNIFICANT CHANGE UP (ref 135–145)
WBC # BLD: 4.55 K/UL — SIGNIFICANT CHANGE UP (ref 3.8–10.5)
WBC # FLD AUTO: 4.55 K/UL — SIGNIFICANT CHANGE UP (ref 3.8–10.5)

## 2020-02-22 PROCEDURE — 99233 SBSQ HOSP IP/OBS HIGH 50: CPT

## 2020-02-22 RX ORDER — CALCIUM CARBONATE 500(1250)
1 TABLET ORAL ONCE
Refills: 0 | Status: COMPLETED | OUTPATIENT
Start: 2020-02-22 | End: 2020-02-22

## 2020-02-22 RX ORDER — ACETAMINOPHEN 500 MG
650 TABLET ORAL EVERY 6 HOURS
Refills: 0 | Status: DISCONTINUED | OUTPATIENT
Start: 2020-02-22 | End: 2020-02-25

## 2020-02-22 RX ADMIN — PANTOPRAZOLE SODIUM 40 MILLIGRAM(S): 20 TABLET, DELAYED RELEASE ORAL at 06:04

## 2020-02-22 RX ADMIN — CLOPIDOGREL BISULFATE 75 MILLIGRAM(S): 75 TABLET, FILM COATED ORAL at 13:00

## 2020-02-22 RX ADMIN — Medication 650 MILLIGRAM(S): at 07:40

## 2020-02-22 RX ADMIN — POLYETHYLENE GLYCOL 3350 17 GRAM(S): 17 POWDER, FOR SOLUTION ORAL at 13:00

## 2020-02-22 RX ADMIN — SENNA PLUS 2 TABLET(S): 8.6 TABLET ORAL at 21:19

## 2020-02-22 RX ADMIN — Medication 1 TABLET(S): at 18:27

## 2020-02-22 RX ADMIN — ATORVASTATIN CALCIUM 40 MILLIGRAM(S): 80 TABLET, FILM COATED ORAL at 21:19

## 2020-02-22 RX ADMIN — Medication 81 MILLIGRAM(S): at 13:00

## 2020-02-22 RX ADMIN — ENOXAPARIN SODIUM 40 MILLIGRAM(S): 100 INJECTION SUBCUTANEOUS at 06:06

## 2020-02-22 NOTE — PROGRESS NOTE ADULT - PROBLEM SELECTOR PLAN 1
Patient presenting with a 1 day history of headache, dizziness, tinnitus, and left leg weakness. NIHSS 1 in ED with negative CT and CTA deferred due to contrast allergy. PT and OT recommending outpatient PT and OT. TTE normal. A1C and Lipid profile normal. Bedside dysphagia passed.    -Pending MRI/MRA brain   -Follow up MRI if pacemaker compatible, EP consulted  -Maintain SBP <200  -Continue Aspirin 81mg QD  -Continue Atorvastatin 80mg QHS  -Continue Lovenox for DVT PPX  -Repeat CT head 2/21 normal Patient presenting with a 1 day history of headache, dizziness, tinnitus, and left leg weakness. NIHSS 1 in ED with negative CT and CTA deferred due to contrast allergy. PT and OT recommending outpatient PT and OT. TTE normal. A1C and Lipid profile normal. Bedside dysphagia passed.    -Pending MRI/MRA brain   -Follow up MRI if pacemaker compatible, EP consulted  -Maintain SBP <200  -Continue Aspirin 81mg QD  -Continue Atorvastatin 80mg QHS  -Continue Lovenox for DVT PPX  -Continue Plavix 75mg  -Repeat CT head 2/21 normal

## 2020-02-22 NOTE — PROGRESS NOTE ADULT - SUBJECTIVE AND OBJECTIVE BOX
OVERNIGHT EVENTS: Tums ordered     SUBJECTIVE / INTERVAL HPI: Patient seen and examined at bedside. Patient reporting squeezing and numbness in her legs. She thinks her blood pressure is elevated and says she gets that when this happens. Her legs feel weak and stiff. She still feels dizzy and has tinnitus. She admits to headache in the morning which has improved with Tylenol.     VITAL SIGNS:  Vital Signs Last 24 Hrs  T(C): 36.4 (22 Feb 2020 13:30), Max: 36.8 (22 Feb 2020 06:00)  T(F): 97.6 (22 Feb 2020 13:30), Max: 98.2 (22 Feb 2020 06:00)  HR: 70 (22 Feb 2020 13:05) (70 - 74)  BP: 195/88 (22 Feb 2020 13:05) (148/69 - 195/88)  BP(mean): 127 (22 Feb 2020 13:05) (96 - 132)  RR: 18 (22 Feb 2020 13:05) (14 - 22)  SpO2: 96% (22 Feb 2020 13:05) (96% - 99%)    PHYSICAL EXAM:    Physical exam:  General: No acute distress, awake and alert  Cardiovascular: Regular rate and rhythm, no murmurs, rubs, or gallops. No bruits  Pulmonary: Anterior breath sounds clear bilaterally, no crackles or wheezing. No use of accessory muscles  Extremities: Radial and DP pulses +2, no edema    Neurologic:  -Mental status: Awake, alert, oriented to person, place, and time. Speech is fluent with intact naming, repetition, and comprehension, no dysarthria. Recent and remote memory intact. Follows commands. Attention/concentration intact. Fund of knowledge appropriate.  -Cranial nerves:   II: Visual fields are full to confrontation.  III, IV, VI: Extraocular movements are intact without nystagmus. Pupils equally round and reactive to light  V:  Facial sensation V1-V3 equal and intact   VII: Face is symmetric with normal eye closure and smile  VIII: Hearing is bilaterally intact to finger rub  IX, X: Uvula is midline and soft palate rises symmetrically  XI: Head turning and shoulder shrug are intact.  XII: Tongue protrudes midline  Motor: Normal bulk and tone. No pronator drift. Strength 5/5 RUE and RLE, 4/5 LUE and LLE   Sensation: Intact to light touch bilaterally  Coordination: No dysmetria on finger-to-nose      MEDICATIONS:  MEDICATIONS  (STANDING):  aspirin enteric coated 81 milliGRAM(s) Oral daily  atorvastatin 40 milliGRAM(s) Oral at bedtime  clopidogrel Tablet 75 milliGRAM(s) Oral daily  enoxaparin Injectable 40 milliGRAM(s) SubCutaneous every 24 hours  pantoprazole    Tablet 40 milliGRAM(s) Oral before breakfast  polyethylene glycol 3350 17 Gram(s) Oral daily  senna 2 Tablet(s) Oral at bedtime    MEDICATIONS  (PRN):  acetaminophen   Tablet .. 650 milliGRAM(s) Oral every 6 hours PRN Moderate Pain (4 - 6)  bisacodyl Suppository 10 milliGRAM(s) Rectal daily PRN Constipation  magnesium hydroxide Suspension 30 milliLiter(s) Oral daily PRN Constipation      ALLERGIES:  Allergies    iodinated radiocontrast agents (Anaphylaxis)    Intolerances        LABS:                        11.9   4.55  )-----------( 184      ( 22 Feb 2020 07:24 )             34.8     02-22    136  |  100  |  9   ----------------------------<  86  4.3   |  25  |  0.82    Ca    9.7      22 Feb 2020 07:23  Mg     2.6     02-22          CAPILLARY BLOOD GLUCOSE          RADIOLOGY & ADDITIONAL TESTS: Reviewed.

## 2020-02-23 LAB
ANION GAP SERPL CALC-SCNC: 7 MMOL/L — SIGNIFICANT CHANGE UP (ref 5–17)
BUN SERPL-MCNC: 9 MG/DL — SIGNIFICANT CHANGE UP (ref 7–23)
CALCIUM SERPL-MCNC: 9.1 MG/DL — SIGNIFICANT CHANGE UP (ref 8.4–10.5)
CHLORIDE SERPL-SCNC: 101 MMOL/L — SIGNIFICANT CHANGE UP (ref 96–108)
CO2 SERPL-SCNC: 27 MMOL/L — SIGNIFICANT CHANGE UP (ref 22–31)
CREAT SERPL-MCNC: 1.03 MG/DL — SIGNIFICANT CHANGE UP (ref 0.5–1.3)
GLUCOSE SERPL-MCNC: 101 MG/DL — HIGH (ref 70–99)
HCT VFR BLD CALC: 35.3 % — SIGNIFICANT CHANGE UP (ref 34.5–45)
HGB BLD-MCNC: 11.7 G/DL — SIGNIFICANT CHANGE UP (ref 11.5–15.5)
MAGNESIUM SERPL-MCNC: 2.5 MG/DL — SIGNIFICANT CHANGE UP (ref 1.6–2.6)
MCHC RBC-ENTMCNC: 24.5 PG — LOW (ref 27–34)
MCHC RBC-ENTMCNC: 33.1 GM/DL — SIGNIFICANT CHANGE UP (ref 32–36)
MCV RBC AUTO: 73.8 FL — LOW (ref 80–100)
NRBC # BLD: 0 /100 WBCS — SIGNIFICANT CHANGE UP (ref 0–0)
PLATELET # BLD AUTO: 194 K/UL — SIGNIFICANT CHANGE UP (ref 150–400)
POTASSIUM SERPL-MCNC: 4.2 MMOL/L — SIGNIFICANT CHANGE UP (ref 3.5–5.3)
POTASSIUM SERPL-SCNC: 4.2 MMOL/L — SIGNIFICANT CHANGE UP (ref 3.5–5.3)
RBC # BLD: 4.78 M/UL — SIGNIFICANT CHANGE UP (ref 3.8–5.2)
RBC # FLD: 17.1 % — HIGH (ref 10.3–14.5)
SODIUM SERPL-SCNC: 135 MMOL/L — SIGNIFICANT CHANGE UP (ref 135–145)
WBC # BLD: 4.39 K/UL — SIGNIFICANT CHANGE UP (ref 3.8–10.5)
WBC # FLD AUTO: 4.39 K/UL — SIGNIFICANT CHANGE UP (ref 3.8–10.5)

## 2020-02-23 PROCEDURE — 99233 SBSQ HOSP IP/OBS HIGH 50: CPT

## 2020-02-23 RX ORDER — LABETALOL HCL 100 MG
5 TABLET ORAL ONCE
Refills: 0 | Status: COMPLETED | OUTPATIENT
Start: 2020-02-23 | End: 2020-02-23

## 2020-02-23 RX ORDER — TRAMADOL HYDROCHLORIDE 50 MG/1
25 TABLET ORAL ONCE
Refills: 0 | Status: DISCONTINUED | OUTPATIENT
Start: 2020-02-23 | End: 2020-02-23

## 2020-02-23 RX ADMIN — ENOXAPARIN SODIUM 40 MILLIGRAM(S): 100 INJECTION SUBCUTANEOUS at 11:33

## 2020-02-23 RX ADMIN — TRAMADOL HYDROCHLORIDE 25 MILLIGRAM(S): 50 TABLET ORAL at 06:42

## 2020-02-23 RX ADMIN — ATORVASTATIN CALCIUM 40 MILLIGRAM(S): 80 TABLET, FILM COATED ORAL at 21:13

## 2020-02-23 RX ADMIN — SENNA PLUS 2 TABLET(S): 8.6 TABLET ORAL at 21:13

## 2020-02-23 RX ADMIN — Medication 81 MILLIGRAM(S): at 11:33

## 2020-02-23 RX ADMIN — MAGNESIUM HYDROXIDE 30 MILLILITER(S): 400 TABLET, CHEWABLE ORAL at 12:30

## 2020-02-23 RX ADMIN — CLOPIDOGREL BISULFATE 75 MILLIGRAM(S): 75 TABLET, FILM COATED ORAL at 11:33

## 2020-02-23 RX ADMIN — Medication 5 MILLIGRAM(S): at 06:42

## 2020-02-23 RX ADMIN — PANTOPRAZOLE SODIUM 40 MILLIGRAM(S): 20 TABLET, DELAYED RELEASE ORAL at 06:12

## 2020-02-23 RX ADMIN — TRAMADOL HYDROCHLORIDE 25 MILLIGRAM(S): 50 TABLET ORAL at 07:42

## 2020-02-23 NOTE — PROGRESS NOTE ADULT - SUBJECTIVE AND OBJECTIVE BOX
OVERNIGHT EVENTS:    SUBJECTIVE: Patient seen and examined at the bedside.     Vital Signs Last 12 Hrs  T(F): 98.1 (02-23-20 @ 05:43), Max: 98.2 (02-22-20 @ 20:00)  HR: 70 (02-23-20 @ 04:16) (70 - 86)  BP: 167/80 (02-23-20 @ 04:16) (148/76 - 167/80)  BP(mean): 115 (02-23-20 @ 04:16) (115 - 115)  RR: 18 (02-23-20 @ 04:16) (18 - 18)  SpO2: 95% (02-23-20 @ 04:16) (95% - 98%)  I&O's Summary      PHYSICAL EXAM:  General: In no acute distress, resting comfortably in bed  HEENT: NCAT, PERRL, EOMI, no conjunctival pallor or scleral icterus, MMM  Neck: Supple, no JVD  Respiratory: Clear to auscultation bilaterally with no wheezes, rales, or rhonchi appreciated  Cardiovascular: RRR, normal S1 and S2, no murmurs, rubs, or gallops appreciated  Vascular: 2+ radial and DP pulses  Abdomen: Soft, NT/ND. Bowel sounds present in all four quadrants with no guarding, rebound tenderness, or palpable masses  Extremities: Warm and well perfused. No clubbing, cyanosis, or edema noted  Skin: No gross skin abnormalities or rashes noted  Neuro:  - Mental Status:  AAOx3; speech is fluent with intact naming, repetition, and comprehension  - Cranial Nerves II-XII:    II:  PERRLA; visual fields are full to confrontation  III, IV, VI:  EOMI, no nystagmus  V:  facial sensation is intact in the V1-V3 distribution bilaterally.  VII:  face is symmetric with normal eye closure and smile  VIII:  hearing is intact to finger rub  IX, X:  uvula is midline and soft palate rises symmetrically  XI:  head turning and shoulder shrug are intact bilaterally  XII:  tongue protrudes in the midline  - Motor:  strength is 5/5 throughout; normal muscle bulk and tone throughout; no pronator drift  - Reflexes:  2+ and symmetric at the biceps, triceps, brachioradialis, knees, and ankles;  plantar reflexes downgoing bilaterally  - Sensory:  intact to light touch, pin prick, vibration, and joint-position sense throughout  - Coordination:  finger-nose-finger and heel-knee-shin intact without dysmetria; rapid alternating hand movements intact  - Gait:  normal steps, base, arm swing, and turning; heel and toe walking are normal; tandem gait is normal; Romberg testing is negative      LABS:                        11.9   4.55  )-----------( 184      ( 22 Feb 2020 07:24 )             34.8     02-22    136  |  100  |  9   ----------------------------<  86  4.3   |  25  |  0.82    Ca    9.7      22 Feb 2020 07:23  Mg     2.6     02-22            RADIOLOGY & ADDITIONAL TESTS: Reviewed.    MEDICATIONS  (STANDING):  aspirin enteric coated 81 milliGRAM(s) Oral daily  atorvastatin 40 milliGRAM(s) Oral at bedtime  clopidogrel Tablet 75 milliGRAM(s) Oral daily  enoxaparin Injectable 40 milliGRAM(s) SubCutaneous every 24 hours  labetalol Injectable 5 milliGRAM(s) IV Push once  pantoprazole    Tablet 40 milliGRAM(s) Oral before breakfast  polyethylene glycol 3350 17 Gram(s) Oral daily  senna 2 Tablet(s) Oral at bedtime  traMADol 25 milliGRAM(s) Oral once    MEDICATIONS  (PRN):  acetaminophen   Tablet .. 650 milliGRAM(s) Oral every 6 hours PRN Moderate Pain (4 - 6)  bisacodyl Suppository 10 milliGRAM(s) Rectal daily PRN Constipation  magnesium hydroxide Suspension 30 milliLiter(s) Oral daily PRN Constipation      Allergies    iodinated radiocontrast agents (Anaphylaxis)    Intolerances OVERNIGHT EVENTS: Headache overnight.    SUBJECTIVE: Patient seen and examined at the bedside. She says her headache is much better now that but that it was horrible last night. Still has some acid reflux.    Vital Signs Last 12 Hrs  T(F): 98.1 (02-23-20 @ 05:43), Max: 98.2 (02-22-20 @ 20:00)  HR: 70 (02-23-20 @ 04:16) (70 - 86)  BP: 167/80 (02-23-20 @ 04:16) (148/76 - 167/80)  BP(mean): 115 (02-23-20 @ 04:16) (115 - 115)  RR: 18 (02-23-20 @ 04:16) (18 - 18)  SpO2: 95% (02-23-20 @ 04:16) (95% - 98%)  I&O's Summary      PHYSICAL EXAM:  General: In no acute distress, resting comfortably in bed  HEENT: NCAT, PERRL, EOMI, no conjunctival pallor or scleral icterus, MMM  Neck: Supple, no JVD  Respiratory: Clear to auscultation bilaterally with no wheezes, rales, or rhonchi appreciated  Cardiovascular: RRR, normal S1 and S2, no murmurs, rubs, or gallops appreciated  Vascular: 2+ radial and DP pulses  Abdomen: Soft, NT/ND. Bowel sounds present in all four quadrants with no guarding, rebound tenderness, or palpable masses  Extremities: Warm and well perfused. No clubbing, cyanosis, or edema noted  Skin: No gross skin abnormalities or rashes noted  Neuro:  - Mental Status:  AAOx3; speech is fluent with intact naming, repetition, and comprehension  - Cranial Nerves II-XII:    II:  PERRLA; visual fields are full to confrontation  III, IV, VI:  EOMI, no nystagmus  V:  facial sensation is intact in the V1-V3 distribution bilaterally.  VII:  face is symmetric with normal eye closure and smile  VIII:  hearing is intact to finger rub but decreased on right  IX, X:  uvula is midline and soft palate rises symmetrically  XI:  head turning and shoulder shrug are intact bilaterally  XII:  tongue protrudes in the midline  - Motor:  strength is 5/5 throughout; normal muscle bulk and tone throughout; no pronator drift  - Sensory:  intact  - Coordination:  finger-nose-finger and heel-knee-shin intact without dysmetria      LABS:                        11.9   4.55  )-----------( 184      ( 22 Feb 2020 07:24 )             34.8     02-22    136  |  100  |  9   ----------------------------<  86  4.3   |  25  |  0.82    Ca    9.7      22 Feb 2020 07:23  Mg     2.6     02-22            RADIOLOGY & ADDITIONAL TESTS: Reviewed.    MEDICATIONS  (STANDING):  aspirin enteric coated 81 milliGRAM(s) Oral daily  atorvastatin 40 milliGRAM(s) Oral at bedtime  clopidogrel Tablet 75 milliGRAM(s) Oral daily  enoxaparin Injectable 40 milliGRAM(s) SubCutaneous every 24 hours  labetalol Injectable 5 milliGRAM(s) IV Push once  pantoprazole    Tablet 40 milliGRAM(s) Oral before breakfast  polyethylene glycol 3350 17 Gram(s) Oral daily  senna 2 Tablet(s) Oral at bedtime  traMADol 25 milliGRAM(s) Oral once    MEDICATIONS  (PRN):  acetaminophen   Tablet .. 650 milliGRAM(s) Oral every 6 hours PRN Moderate Pain (4 - 6)  bisacodyl Suppository 10 milliGRAM(s) Rectal daily PRN Constipation  magnesium hydroxide Suspension 30 milliLiter(s) Oral daily PRN Constipation      Allergies    iodinated radiocontrast agents (Anaphylaxis)    Intolerances

## 2020-02-23 NOTE — PROGRESS NOTE ADULT - PROBLEM SELECTOR PLAN 1
Patient presenting with a 1 day history of headache, dizziness, tinnitus, and left leg weakness. NIHSS 1 in ED with negative CT and CTA deferred due to contrast allergy. PT and OT recommending outpatient PT and OT. TTE normal. A1C and Lipid profile normal. Bedside dysphagia passed. Repeat CT head normal.    -Pacemaker compatible, EP consulted and possible, but need to coordinate with radiology and EP  -Maintain SBP <200  -Continue Aspirin 81mg QD  -Continue Atorvastatin 80mg QHS  -Continue Lovenox for DVT PPX  -Continue Plavix 75mg Patient presenting with a 1 day history of headache, dizziness, tinnitus, and left leg weakness. NIHSS 1 in ED with negative CT and CTA deferred due to contrast allergy. PT and OT recommending outpatient PT and OT. TTE normal. A1C and Lipid profile normal. Bedside dysphagia passed. Repeat CT head normal.    -Pacemaker compatible, EP consulted and possible, but need to coordinate with radiology and EP on Monday  -Maintain SBP <200  -Continue Aspirin 81mg QD  -Continue Atorvastatin 80mg QHS  -Continue Lovenox for DVT PPX  -Continue Plavix 75mg

## 2020-02-24 ENCOUNTER — TRANSCRIPTION ENCOUNTER (OUTPATIENT)
Age: 85
End: 2020-02-24

## 2020-02-24 LAB — GLUCOSE BLDC GLUCOMTR-MCNC: 106 MG/DL — HIGH (ref 70–99)

## 2020-02-24 PROCEDURE — 99233 SBSQ HOSP IP/OBS HIGH 50: CPT

## 2020-02-24 RX ORDER — METOPROLOL TARTRATE 50 MG
50 TABLET ORAL EVERY 24 HOURS
Refills: 0 | Status: DISCONTINUED | OUTPATIENT
Start: 2020-02-24 | End: 2020-02-25

## 2020-02-24 RX ORDER — ATORVASTATIN CALCIUM 80 MG/1
1 TABLET, FILM COATED ORAL
Qty: 30 | Refills: 0
Start: 2020-02-24 | End: 2020-03-24

## 2020-02-24 RX ORDER — AMLODIPINE BESYLATE 2.5 MG/1
10 TABLET ORAL DAILY
Refills: 0 | Status: DISCONTINUED | OUTPATIENT
Start: 2020-02-24 | End: 2020-02-25

## 2020-02-24 RX ORDER — CLOPIDOGREL BISULFATE 75 MG/1
1 TABLET, FILM COATED ORAL
Qty: 30 | Refills: 0
Start: 2020-02-24 | End: 2020-03-24

## 2020-02-24 RX ORDER — LABETALOL HCL 100 MG
5 TABLET ORAL ONCE
Refills: 0 | Status: COMPLETED | OUTPATIENT
Start: 2020-02-24 | End: 2020-02-24

## 2020-02-24 RX ORDER — AMLODIPINE BESYLATE 2.5 MG/1
1 TABLET ORAL
Qty: 0 | Refills: 0 | DISCHARGE

## 2020-02-24 RX ADMIN — POLYETHYLENE GLYCOL 3350 17 GRAM(S): 17 POWDER, FOR SOLUTION ORAL at 11:59

## 2020-02-24 RX ADMIN — PANTOPRAZOLE SODIUM 40 MILLIGRAM(S): 20 TABLET, DELAYED RELEASE ORAL at 06:35

## 2020-02-24 RX ADMIN — Medication 50 MILLIGRAM(S): at 13:10

## 2020-02-24 RX ADMIN — ATORVASTATIN CALCIUM 40 MILLIGRAM(S): 80 TABLET, FILM COATED ORAL at 21:19

## 2020-02-24 RX ADMIN — MAGNESIUM HYDROXIDE 30 MILLILITER(S): 400 TABLET, CHEWABLE ORAL at 21:20

## 2020-02-24 RX ADMIN — ENOXAPARIN SODIUM 40 MILLIGRAM(S): 100 INJECTION SUBCUTANEOUS at 23:52

## 2020-02-24 RX ADMIN — AMLODIPINE BESYLATE 10 MILLIGRAM(S): 2.5 TABLET ORAL at 17:44

## 2020-02-24 RX ADMIN — SENNA PLUS 2 TABLET(S): 8.6 TABLET ORAL at 21:19

## 2020-02-24 RX ADMIN — Medication 81 MILLIGRAM(S): at 11:57

## 2020-02-24 RX ADMIN — ENOXAPARIN SODIUM 40 MILLIGRAM(S): 100 INJECTION SUBCUTANEOUS at 00:34

## 2020-02-24 RX ADMIN — CLOPIDOGREL BISULFATE 75 MILLIGRAM(S): 75 TABLET, FILM COATED ORAL at 11:57

## 2020-02-24 RX ADMIN — Medication 5 MILLIGRAM(S): at 07:32

## 2020-02-24 NOTE — PROGRESS NOTE ADULT - PROBLEM SELECTOR PLAN 1
Patient presenting with a 1 day history of headache, dizziness, tinnitus, and left leg weakness. NIHSS 1 in ED with negative CT and CTA deferred due to contrast allergy. PT and OT recommending outpatient PT and OT. TTE normal. A1C and Lipid profile normal. Bedside dysphagia passed. Repeat CT head normal.      -Maintain SBP <200  -Continue Aspirin 81mg QD  -Continue Atorvastatin 80mg QHS  -Continue Lovenox for DVT PPX  -Continue Plavix 75mg

## 2020-02-24 NOTE — PROGRESS NOTE ADULT - SUBJECTIVE AND OBJECTIVE BOX
TRANSFER FROM [facility] TO [facility]    HOSPITAL COURSE:  Ms. Garcia is a 89-year-old female with a past medical history of HTN, GERD, Asthma, CAD status post stent and PPM who presented with a 1-day history of headache, dizziness, sinus pressure, tinnitus, and left leg weakness. ED workup negative and admitted for further stroke workup. She underwent a repeat CT scan which was also negative. Her LLE weakness improved significantly and PT is recommending outpatient PT/OT at discharge. She has had a few episodes of hypertension requiring IV Labetalol 5mg with improvement. She continues to be hypertensive and having GERD symptoms. She is stable for stepdown to [facility] for further management of hypertension and GERD.    SUBJECTIVE:  Patient seen and examined at bedside.    Vital Signs Last 12 Hrs  T(F): 97.9 (02-24-20 @ 18:19), Max: 98 (02-24-20 @ 13:43)  HR: 70 (02-24-20 @ 17:05) (70 - 72)  BP: 186/91 (02-24-20 @ 17:05) (157/85 - 186/91)  BP(mean): 131 (02-24-20 @ 17:05) (110 - 131)  RR: 19 (02-24-20 @ 16:12) (19 - 20)  SpO2: 95% (02-24-20 @ 17:05) (91% - 97%)  I&O's Summary      PHYSICAL EXAM:  General: In no acute distress, resting comfortably in bed  HEENT: NCAT, PERRL, EOMI, no conjunctival pallor or scleral icterus, MMM  Neck: Supple, no JVD  Respiratory: Clear to auscultation bilaterally with no wheezes, rales, or rhonchi appreciated  Cardiovascular: RRR, normal S1 and S2, no murmurs, rubs, or gallops appreciated  Vascular: 2+ radial and DP pulses  Abdomen: Soft, NT/ND. Bowel sounds present in all four quadrants with no guarding, rebound tenderness, or palpable masses  Extremities: Warm and well perfused. No clubbing, cyanosis, or edema noted  Skin: No gross skin abnormalities or rashes noted  Neuro:  - Mental Status:  AAOx3; speech is fluent with intact naming, repetition, and comprehension  - Cranial Nerves II-XII:    II:  PERRLA; visual fields are full to confrontation  III, IV, VI:  EOMI, no nystagmus  V:  facial sensation is intact in the V1-V3 distribution bilaterally.  VII:  face is symmetric with normal eye closure and smile  VIII:  hearing is intact to finger rub  IX, X:  uvula is midline and soft palate rises symmetrically  XI:  head turning and shoulder shrug are intact bilaterally  XII:  tongue protrudes in the midline  - Motor:  strength is 5/5 throughout; normal muscle bulk and tone throughout; no pronator drift  - Sensory:  intact to light touch bilaterally  - Coordination:  finger-nose-finger intact without dysmetria      LABS:                        11.7   4.39  )-----------( 194      ( 23 Feb 2020 05:49 )             35.3     02-23    135  |  101  |  9   ----------------------------<  101<H>  4.2   |  27  |  1.03    Ca    9.1      23 Feb 2020 05:49  Mg     2.5     02-23              RADIOLOGY & ADDITIONAL TESTS:    MEDICATIONS  (STANDING):  amLODIPine   Tablet 10 milliGRAM(s) Oral daily  aspirin enteric coated 81 milliGRAM(s) Oral daily  atorvastatin 40 milliGRAM(s) Oral at bedtime  clopidogrel Tablet 75 milliGRAM(s) Oral daily  enoxaparin Injectable 40 milliGRAM(s) SubCutaneous every 24 hours  metoprolol succinate ER 50 milliGRAM(s) Oral every 24 hours  pantoprazole    Tablet 40 milliGRAM(s) Oral before breakfast  polyethylene glycol 3350 17 Gram(s) Oral daily  senna 2 Tablet(s) Oral at bedtime    MEDICATIONS  (PRN):  acetaminophen   Tablet .. 650 milliGRAM(s) Oral every 6 hours PRN Moderate Pain (4 - 6)  magnesium hydroxide Suspension 30 milliLiter(s) Oral daily PRN Constipation TRANSFER FROM [facility] TO [facility]    HOSPITAL COURSE:  Ms. Garcia is a 89-year-old female with a past medical history of HTN, GERD, Asthma, CAD status post stent and PPM who presented with a 1-day history of headache, dizziness, sinus pressure, tinnitus, and left leg weakness. ED workup negative and admitted for further stroke workup. She underwent a repeat CT scan which was also negative. Her LLE weakness improved significantly and PT is recommending outpatient PT/OT at discharge. She has had a few episodes of hypertension requiring IV Labetalol 5mg with improvement. She continues to be hypertensive and having GERD symptoms. She is stable for stepdown to [facility] for further management of hypertension and GERD.    SUBJECTIVE: Patient seen and examined at bedside. Patient appears comfortable and in no acute distress. She complains of mild dizziness but denies any headaches or blurry vision. She says that when her blood pressure goes up her lightheadedness feels worse and her whole body feels under pressure. Now she says that her pressure has been well controlled and she is feeling better. She denies any fevers, chills, and shortness of breath. She says she feels some heartburn intermittently not associated with any abdominal pain, nausea, vomiting, diarrhea, constipation.     Vital Signs Last 12 Hrs  T(F): 97.9 (02-24-20 @ 18:19), Max: 98 (02-24-20 @ 13:43)  HR: 70 (02-24-20 @ 17:05) (70 - 72)  BP: 186/91 (02-24-20 @ 17:05) (157/85 - 186/91)  BP(mean): 131 (02-24-20 @ 17:05) (110 - 131)  RR: 19 (02-24-20 @ 16:12) (19 - 20)  SpO2: 95% (02-24-20 @ 17:05) (91% - 97%)  I&O's Summary      PHYSICAL EXAM:  General: In no acute distress, resting comfortably in bed  HEENT: NCAT, PERRL, EOMI, no conjunctival pallor or scleral icterus, MMM  Neck: Supple, no JVD  Respiratory: Clear to auscultation bilaterally with no wheezes, rales, or rhonchi appreciated  Cardiovascular: RRR, normal S1 and S2, no murmurs, rubs, or gallops appreciated  Vascular: 2+ radial and DP pulses  Abdomen: Soft, NT/ND. Bowel sounds present in all four quadrants with no guarding, rebound tenderness, or palpable masses  Extremities: Warm and well perfused. No clubbing, cyanosis, or edema noted  Skin: No gross skin abnormalities or rashes noted  Neuro:  - Mental Status:  AAOx3; speech is fluent with intact naming, repetition, and comprehension  - Cranial Nerves II-XII:    II:  PERRLA; visual fields are full to confrontation  III, IV, VI:  EOMI, no nystagmus  V:  facial sensation is intact in the V1-V3 distribution bilaterally.  VII:  face is symmetric with normal eye closure and smile  VIII:  hearing is intact to finger rub  IX, X:  uvula is midline and soft palate rises symmetrically  XI:  head turning and shoulder shrug are intact bilaterally  XII:  tongue protrudes in the midline  - Motor:  strength is 5/5 throughout; normal muscle bulk and tone throughout; no pronator drift  - Sensory:  intact to light touch bilaterally  - Coordination:  finger-nose-finger intact without dysmetria      LABS:                        11.7   4.39  )-----------( 194      ( 23 Feb 2020 05:49 )             35.3     02-23    135  |  101  |  9   ----------------------------<  101<H>  4.2   |  27  |  1.03    Ca    9.1      23 Feb 2020 05:49  Mg     2.5     02-23              RADIOLOGY & ADDITIONAL TESTS:    MEDICATIONS  (STANDING):  amLODIPine   Tablet 10 milliGRAM(s) Oral daily  aspirin enteric coated 81 milliGRAM(s) Oral daily  atorvastatin 40 milliGRAM(s) Oral at bedtime  clopidogrel Tablet 75 milliGRAM(s) Oral daily  enoxaparin Injectable 40 milliGRAM(s) SubCutaneous every 24 hours  metoprolol succinate ER 50 milliGRAM(s) Oral every 24 hours  pantoprazole    Tablet 40 milliGRAM(s) Oral before breakfast  polyethylene glycol 3350 17 Gram(s) Oral daily  senna 2 Tablet(s) Oral at bedtime    MEDICATIONS  (PRN):  acetaminophen   Tablet .. 650 milliGRAM(s) Oral every 6 hours PRN Moderate Pain (4 - 6)  magnesium hydroxide Suspension 30 milliLiter(s) Oral daily PRN Constipation

## 2020-02-24 NOTE — CHART NOTE - NSCHARTNOTEFT_GEN_A_CORE
Goals reviewed at interdisciplinary rounds with case management, social work, physical therapy, occupational therapy, and speech language pathology.    Please see specific therapy  notes for in depth goals.    Highlights of goals are:    Patient will:   Physical therapy  [] remain on bedrest  [] get out of bed to chair [] ambulate with assistance [x] ambulate without assistance  []today       [] by discharge    Occupational therapy  [] N/a, independent []go from supine to seated independently [] balance sitting at the edge of the bed to do grooming task []stand at sink to perform grooming task  [] dress self   [] gain strength to feed self  [x] other: continue working on walking balance  []today         [x]by discharge    Speech therapy  [x] N/a, no speech deficit [] vocalize [] respond to yes/no questions given cues with 80% accuracy [] name common objects [] express basic needs/wants  []other:  []today        [] by discharge      Swallow therapy   [] remain NPO, receive oral care to minimize aspirating bacteria mouth bacteria [] tolerate pureed diet  [] tolerate mechanical soft diet [x] tolerate regular diet  [] tolerate tube feeds [] tolerate thin liquids [] tolerate nectar thick liquids [] other:  [] with assistance  [] today       [] by discharge

## 2020-02-24 NOTE — DISCHARGE NOTE PROVIDER - NSDCFUADDAPPT_GEN_ALL_CORE_FT
You have an appointment scheduled with Dr. Delacruz's NP, Donn, on March 9 at 3PM    Please also see your PCP within a week to have your blood pressure checked You have an appointment scheduled with Dr. Delacruz's NP, Donn, on March 9 at 3PM    Please also see your PCP within a week to have your blood pressure checked.     Please continue with outpatient physical therapy and occupational therapy.

## 2020-02-24 NOTE — DISCHARGE NOTE PROVIDER - NSDCMRMEDTOKEN_GEN_ALL_CORE_FT
aspirin 81 mg oral tablet, chewable: 1 tab(s) orally once a day  atorvastatin 80 mg oral tablet: 1 tab(s) orally once a day (at bedtime) MDD:1 tab  clopidogrel 75 mg oral tablet: 1 tab(s) orally once a day MDD:1 tab  metoprolol succinate 50 mg oral tablet, extended release: 1 tab(s) orally once a day  Protonix 40 mg oral delayed release tablet: 1 tab(s) orally once a day aspirin 81 mg oral tablet, chewable: 1 tab(s) orally once a day  atorvastatin 80 mg oral tablet: 1 tab(s) orally once a day (at bedtime) MDD:1 tab  clopidogrel 75 mg oral tablet: 1 tab(s) orally once a day MDD:1 tab  metoprolol succinate 50 mg oral tablet, extended release: 1 tab(s) orally once a day  Norvasc 10 mg oral tablet: 1 tab(s) orally once a day  Protonix 40 mg oral delayed release tablet: 1 tab(s) orally once a day

## 2020-02-24 NOTE — PROGRESS NOTE ADULT - SUBJECTIVE AND OBJECTIVE BOX
Physical Medicine and Rehabilitation Progress Note:    Patient is a 89y old  Female who presents with a chief complaint of Stroke (24 Feb 2020 13:40)      HPI:  Ms. Garcia is a 89-year-old female with a past medical history of HTN, GERD, Asthma, CAD status post stent and PPM who is presenting today with a 1-day history of headache, dizziness, sinus pressure, tinnitus, and left leg weakness. She states that she attempted to sleep last night but could not and around 6AM she had acute onset severe headache that she describes as her heading exploding. It was at this time that she noticed some left leg weakness and decided to come to [facility]. Stroke code was called in the ED and NIHSS 0 on presentation. Head CT without evidence of infarct or hemorrhage. CTA/CTP not completed because she is allergic to iodine contrast. She was outside of the window for tPA. She was admitted for further stroke workup (20 Feb 2020 17:50)                            11.7   4.39  )-----------( 194      ( 23 Feb 2020 05:49 )             35.3       02-23    135  |  101  |  9   ----------------------------<  101<H>  4.2   |  27  |  1.03    Ca    9.1      23 Feb 2020 05:49  Mg     2.5     02-23      Vital Signs Last 24 Hrs  T(C): 36.7 (24 Feb 2020 13:43), Max: 36.9 (23 Feb 2020 21:59)  T(F): 98 (24 Feb 2020 13:43), Max: 98.5 (23 Feb 2020 21:59)  HR: 72 (24 Feb 2020 12:00) (70 - 72)  BP: 169/80 (24 Feb 2020 12:00) (122/59 - 211/94)  BP(mean): 115 (24 Feb 2020 12:00) (85 - 115)  RR: 20 (24 Feb 2020 12:00) (16 - 20)  SpO2: 95% (24 Feb 2020 12:00) (95% - 100%)    MEDICATIONS  (STANDING):  aspirin enteric coated 81 milliGRAM(s) Oral daily  atorvastatin 40 milliGRAM(s) Oral at bedtime  clopidogrel Tablet 75 milliGRAM(s) Oral daily  enoxaparin Injectable 40 milliGRAM(s) SubCutaneous every 24 hours  metoprolol succinate ER 50 milliGRAM(s) Oral every 24 hours  pantoprazole    Tablet 40 milliGRAM(s) Oral before breakfast  polyethylene glycol 3350 17 Gram(s) Oral daily  senna 2 Tablet(s) Oral at bedtime    MEDICATIONS  (PRN):  acetaminophen   Tablet .. 650 milliGRAM(s) Oral every 6 hours PRN Moderate Pain (4 - 6)  bisacodyl Suppository 10 milliGRAM(s) Rectal daily PRN Constipation  magnesium hydroxide Suspension 30 milliLiter(s) Oral daily PRN Constipation    Currently Undergoing Physical Therapy at bedside.    Functional Status Assessment:  2/21/2020    Previous Level of Function:     · Ambulation Skills	independent; needs device	  · Transfer Skills	independent; needs device	  · ADL Skills	independent; needs device	  · Work/Leisure Activity	independent; needs device	  · Additional Comments	Patient lives in apartment with 5 steps to enter. Ambulated with cane outdoors PTA. Patient has a grandson that helps her out with some shopping, chores. Denies recent falls	    Cognitive Status Examination:   · Orientation	oriented to person, place, time and situation	  · Level of Consciousness	alert	  · Follows Commands and Answers Questions	100% of the time; able to follow single-step instructions	  · Personal Safety and Judgment	intact	    Range of Motion Exam:   · Active Range of Motion Examination	bilateral upper extremity Active ROM was WFL (within functional limits); bilateral  lower extremity Active ROM was WFL (within functional limits)	    Manual Muscle Testing:   · Manual Muscle Testing Results	Both UE/LE at least 3+/5 throughout	    Bed Mobility: Sit to Supine:     · Level of [place]	independent	    Bed Mobility: Supine to Sit:     · Level of [place]	independent	    Transfer: Sit to Stand:     · Level of [place]	supervision	  · Physical Assist/Nonphysical Assist	supervision	  · Weight-Bearing Restrictions	weight-bearing as tolerated	  · Assistive Device	straight cane	    Transfer: Stand to Sit:     · Level of [place]	supervision	  · Physical Assist/Nonphysical Assist	supervision; verbal cues	  · Weight-Bearing Restrictions	weight-bearing as tolerated	  · Assistive Device	straight cane	    Sit/Stand Transfer Safety Analysis:     · Impairments Contributing to Impaired Transfers	impaired balance; dizziness	    Gait Skills:     · Level of [place]	supervision; contact guard	  · Physical Assist/Nonphysical Assist	1 person assist; verbal cues	  · Weight-Bearing Restrictions	weight-bearing as tolerated	  · Gait Distance	50 feet	    Gait Analysis:     · Gait Pattern Used	3-point gait	  · Gait Deviations Noted	decreased kaley; decreased step length	  · Impairments Contributing to Gait Deviations	impaired balance; dizziness	    Stair Negotiation:     · Level of [place]	TBA	    Balance Skills Assessment:     · Sitting Balance: Static	normal balance	  · Sitting Balance: Dynamic	normal balance	  · Sit-to-Stand Balance	fair balance	  · Standing Balance: Static	good balance	  · Standing Balance: Dynamic	fair balance	    Sensory Examination:   Sensory Examination:    Hot/Cold Sensation:   · Left UE	within normal limits	  · Right UE	within normal limits	  · Left LE	within normal limits  Patient intact to light touch but reports feeling of numbness lower leg/foot as PTA	  · Right LE	within normal limits  Patient intact to light touch but reports feeling of numbness lower leg/veronica as PTA	    · Coordination Assessed	finger to nose; heel to shin; Grossly intact bilaterally	      Proprioception:   · Coordination Assessed	finger to nose; heel to shin; Grossly intact bilaterally	      Treatment Location:   · Comments	smile symmetrical, tongue protrusion midline, opens/closes eyes intact bilaterally, raises eyebrows intact bilaterally, puffs cheeks intact bilaterally, SILT face, shoulder shrug WNL bilaterally	    Clinical Impressions:   · Criteria for Skilled Therapeutic Interventions	impairments found; rehab potential; therapy frequency; anticipated discharge recommendation	  · Impairments Found (describe specific impairments)	gait, locomotion, and balance	  · Rehab Potential	good, to achieve stated therapy goals	  · Therapy Frequency	2-3x/week	        PM&R Impression: as above    Current Disposition Plan Recommendations:  d/c home, outpatient physical therapy

## 2020-02-24 NOTE — PROGRESS NOTE ADULT - PROBLEM SELECTOR PLAN 1
Patient presenting with a 1 day history of headache, dizziness, tinnitus, and left leg weakness. NIHSS 1 in ED with negative CT and CTA deferred due to contrast allergy. PT and OT recommending outpatient PT and OT. TTE normal. A1C and Lipid profile normal. Bedside dysphagia passed. Repeat CT head normal.    -Pacemaker compatible, EP consulted and possible, but need to coordinate with radiology and EP on Monday  -Maintain SBP <200  -Continue Aspirin 81mg QD  -Continue Atorvastatin 80mg QHS  -Continue Lovenox for DVT PPX  -Continue Plavix 75mg Patient presenting with a 1 day history of headache, dizziness, tinnitus, and left leg weakness. NIHSS 1 in ED with negative CT and CTA deferred due to contrast allergy. PT and OT recommending outpatient PT and OT. TTE normal. A1C and Lipid profile normal. Bedside dysphagia passed. Repeat CT head normal.      -Maintain SBP <200  -Continue Aspirin 81mg QD  -Continue Atorvastatin 80mg QHS  -Continue Lovenox for DVT PPX  -Continue Plavix 75mg

## 2020-02-24 NOTE — PROGRESS NOTE ADULT - PROBLEM SELECTOR PROBLEM 4
Gastroesophageal reflux disease

## 2020-02-24 NOTE — PROGRESS NOTE ADULT - PROBLEM SELECTOR PLAN 3
History of hypertension on Metoprolol and Amlodipine    -Holding home anti-hypertensives to allow permissive hypertension for now History of hypertension on Metoprolol and Amlodipine    -Restarted home Metoprolol today

## 2020-02-24 NOTE — PROGRESS NOTE ADULT - PROBLEM SELECTOR PLAN 6
1) PCP Contacted on Admission: (Y/N) --> Name & Phone #:  2) Date of Contact with PCP:  3) PCP Contacted at Discharge: (Y/N)  4) Summary of Handoff Given to PCP:   5) Post-Discharge Appointment Date and Location:

## 2020-02-24 NOTE — DISCHARGE NOTE PROVIDER - NSDCCPCAREPLAN_GEN_ALL_CORE_FT
PRINCIPAL DISCHARGE DIAGNOSIS  Diagnosis: Stroke  Assessment and Plan of Treatment: You came to the emergency department with left leg weakness. You were worked up for a stroke while you were in the hospital. Your CT head scan was negative twice but given your symptoms, the history, and the way you've improved, we believe you had a small stroke. You are being discharged on an increased dose of Atorvastatin now 80mg a day at night, and Plavix 75mg a day. You have follow up scheduled with Dr. Delacruz.      SECONDARY DISCHARGE DIAGNOSES  Diagnosis: Hypertension  Assessment and Plan of Treatment: You have a history of high blood pressure and take Metoprolol and Amlodipine at home. Your medications were initially held but we've restarted your Metoprolol.  Please do not take your Amlodipine until you follow up with Dr. Delacruz  Please follow up with your PCP and have at least 1 blood pressure check PRINCIPAL DISCHARGE DIAGNOSIS  Diagnosis: Stroke  Assessment and Plan of Treatment: You came to the emergency department with left leg weakness. You were worked up for a stroke while you were in the hospital. Your CT head scan was negative twice but given your symptoms, the history, and the way you've improved, we believe you had a small stroke. You are being discharged on an increased dose of Atorvastatin now 80mg a day at night, and Plavix 75mg a day. You have follow up scheduled with Dr. Delacruz.      SECONDARY DISCHARGE DIAGNOSES  Diagnosis: Hypertension  Assessment and Plan of Treatment: You have a history of high blood pressure and take Metoprolol and Amlodipine at home. Your medications were initially held but we've restarted your Metoprolol and amlodipine. Please follow-up with Dr. Delacruz on your prescheduled appointment.

## 2020-02-24 NOTE — PROGRESS NOTE ADULT - PROBLEM SELECTOR PLAN 4
History of GERD on PPI at home    -Continue Protonix 40mg QD

## 2020-02-24 NOTE — DISCHARGE NOTE PROVIDER - HOSPITAL COURSE
#Discharge: do not delete        Ms. Garcia is a 89-year-old female with a past medical history of HTN, GERD, Asthma, CAD status post stent and PPM who is presenting today with a 1-day history of headache, dizziness, sinus pressure, tinnitus, and left leg weakness. ED workup negative and admitted for further stroke workup.        Problem List/Main Diagnoses (system-based):     1. Stroke - CT negative, CTA deferred due to contrast allergy. TTE normal. Repeat CT normal. PT and OT cleared for discharge. Symptoms dramatically improved. TSH A1C Lipids normal. Discharge with follow-up. Aspirin, Plavix, Statin    2. CAD - Aspirin 81mg QD and Atorvastatin 80mg QHS    3. HTN - Restarted home Metoprolol and holding Amlodipine at DC    4. GERD - Protonix 40mg QD, Maalox/TUMS PRN    5.        Inpatient treatment course: Metoprolol, CT head, Labetalol,     New medications: Atorvastatin 80mg QHS and Plavix 75mg QD    Labs to be followed outpatient: None    Exam to be followed outpatient: Blood pressure checks #Discharge: do not delete        Ms. Garcia is a 89-year-old female with a past medical history of HTN, GERD, Asthma, CAD status post stent and PPM who presented with a 1-day history of headache, dizziness, sinus pressure, tinnitus, and left leg weakness. ED workup negative and admitted for further stroke workup. She underwent a repeat CT scan which was also negative.         Problem List/Main Diagnoses (system-based):     1. Stroke - CT negative, CTA deferred due to contrast allergy. TTE normal. Repeat CT normal. PT and OT cleared for discharge. Symptoms dramatically improved. TSH A1C Lipids normal. Discharge with follow-up. Aspirin, Plavix, Statin    2. CAD - Aspirin 81mg QD and Atorvastatin 80mg QHS    3. HTN - Restarted home Metoprolol and holding Amlodipine at DC    4. GERD - Protonix 40mg QD, Maalox/TUMS PRN    5.        Inpatient treatment course: Metoprolol, CT head, Labetalol,     New medications: Atorvastatin 80mg QHS and Plavix 75mg QD    Labs to be followed outpatient: None    Exam to be followed outpatient: Blood pressure checks #Discharge: do not delete        Ms. Garcia is a 89-year-old female with a past medical history of HTN, GERD, Asthma, CAD status post stent and PPM who presented with a 1-day history of headache, dizziness, sinus pressure, tinnitus, and left leg weakness. ED workup negative and admitted for further stroke workup. She underwent a repeat CT scan which was also negative.         Problem List/Main Diagnoses (system-based):     1. Stroke - CT negative, CTA deferred due to contrast allergy. TTE normal. Repeat CT normal. PT and OT cleared for discharge. Symptoms dramatically improved. TSH A1C Lipids normal. Discharge with follow-up. Aspirin, Plavix, Statin    2. CAD - Aspirin 81mg QD and Atorvastatin 80mg QHS    3. HTN - Restarted home Metoprolol and Amlodipine    4. GERD - Protonix 40mg QD, Maalox/TUMS PRN        Inpatient treatment course: Metoprolol, CT head, Labetalol,     New medications: Atorvastatin 80mg QHS and Plavix 75mg QD    Labs to be followed outpatient: None    Exam to be followed outpatient: Blood pressure checks

## 2020-02-24 NOTE — PROGRESS NOTE ADULT - PROBLEM SELECTOR PLAN 5
F: None  E: Replete PRN  N: DASH  GI PPX: Protonix  DVT PPX: Lovenox  Code: Full  Dispo: [facility] F: None  E: Replete PRN  N: DASH  GI PPX: Protonix  DVT PPX: Lovenox  Code: Full  Dispo: [facility]

## 2020-02-24 NOTE — PROGRESS NOTE ADULT - ASSESSMENT
Ms. Garcia is a 89-year-old female with a past medical history of HTN, GERD, Asthma, CAD status post stent and PPM who is presenting today with a 1-day history of headache, dizziness, sinus pressure, tinnitus, and left leg weakness. ED workup negative and admitted for further stroke workup.
per Neurology    Ms. Garcia is a 89-year-old female with a past medical history of HTN, GERD, Asthma, CAD status post stent and PPM who is presenting today with a 1-day history of headache, dizziness, sinus pressure, tinnitus, and left leg weakness. ED workup negative and admitted for further stroke workup.    Problem/Plan - 1:  ·  Problem: R/O Stroke.  Plan: Patient presenting with a 1 day history of headache, dizziness, tinnitus, and left leg weakness. NIHSS 1 in ED with negative CT and CTA deferred due to contrast allergy. PT and OT recommending outpatient PT and OT. TTE normal. A1C and Lipid profile normal. Bedside dysphagia passed. Repeat CT head normal.      -Maintain SBP <200  -Continue Aspirin 81mg QD  -Continue Atorvastatin 80mg QHS  -Continue Lovenox for DVT PPX  -Continue Plavix 75mg.     Problem/Plan - 2:  ·  Problem: Atherosclerosis of coronary artery.  Plan: History of CAD status post stent but only on Aspirin    -Continue Aspirin 81mg QD  -Continue Atorvastatin 80mg QHS.     Problem/Plan - 3:  ·  Problem: Essential hypertension.  Plan: History of hypertension on Metoprolol and Amlodipine    -Restarted home Metoprolol today.     Problem/Plan - 4:  ·  Problem: Gastroesophageal reflux disease.  Plan: History of GERD on PPI at home    -Continue Protonix 40mg QD.     Problem/Plan - 5:  ·  Problem: Nutrition, metabolism, and development symptoms.  Plan: F: None  E: Replete PRN  N: DASH  GI PPX: Protonix  DVT PPX: Lovenox  Code: Full  Dispo: 7LA.
Ms. Garcia is a 89-year-old female with a past medical history of HTN, GERD, Asthma, CAD status post stent and PPM who is presenting today with a 1-day history of headache, dizziness, sinus pressure, tinnitus, and left leg weakness. ED workup negative and admitted for further stroke workup.

## 2020-02-24 NOTE — DISCHARGE NOTE PROVIDER - NSDCCPTREATMENT_GEN_ALL_CORE_FT
PRINCIPAL PROCEDURE  Procedure: CT head wo con  Findings and Treatment: IMPRESSION:   1. No acute intracranial hemorrhage or transcortical infarct.  2. Age-appropriate volume loss and chronic microangiopathic disease.

## 2020-02-24 NOTE — DISCHARGE NOTE PROVIDER - CARE PROVIDER_API CALL
Melissa Delacruz)  Neurology; Vascular Neurology  [address], [address] [place] [address]  Phone: [phone]  Fax: [phone]  Follow Up Time:

## 2020-02-24 NOTE — PROGRESS NOTE ADULT - SUBJECTIVE AND OBJECTIVE BOX
OVERNIGHT EVENTS:    SUBJECTIVE: Patient seen and examined at the bedside.     Vital Signs Last 12 Hrs  T(F): 98.1 (02-24-20 @ 06:00), Max: 98.5 (02-23-20 @ 21:59)  HR: 70 (02-24-20 @ 04:05) (70 - 72)  BP: 163/69 (02-24-20 @ 04:05) (122/59 - 163/69)  BP(mean): 99 (02-24-20 @ 04:05) (85 - 99)  RR: 18 (02-24-20 @ 04:05) (16 - 18)  SpO2: 100% (02-24-20 @ 04:05) (96% - 100%)  I&O's Summary      PHYSICAL EXAM:  General: In no acute distress, resting comfortably in bed  HEENT: NCAT, PERRL, EOMI, no conjunctival pallor or scleral icterus, MMM  Neck: Supple, no JVD  Respiratory: Clear to auscultation bilaterally with no wheezes, rales, or rhonchi appreciated  Cardiovascular: RRR, normal S1 and S2, no murmurs, rubs, or gallops appreciated  Vascular: 2+ radial and DP pulses  Abdomen: Soft, NT/ND. Bowel sounds present in all four quadrants with no guarding, rebound tenderness, or palpable masses  Extremities: Warm and well perfused. No clubbing, cyanosis, or edema noted  Skin: No gross skin abnormalities or rashes noted  Neuro: AAOx3 with no cranial nerve deficits. Strength and sensation intact throughout.    LABS:                        11.7   4.39  )-----------( 194      ( 23 Feb 2020 05:49 )             35.3     02-23    135  |  101  |  9   ----------------------------<  101<H>  4.2   |  27  |  1.03    Ca    9.1      23 Feb 2020 05:49  Mg     2.5     02-23            RADIOLOGY & ADDITIONAL TESTS: Reviewed.    MEDICATIONS  (STANDING):  aspirin enteric coated 81 milliGRAM(s) Oral daily  atorvastatin 40 milliGRAM(s) Oral at bedtime  clopidogrel Tablet 75 milliGRAM(s) Oral daily  enoxaparin Injectable 40 milliGRAM(s) SubCutaneous every 24 hours  pantoprazole    Tablet 40 milliGRAM(s) Oral before breakfast  polyethylene glycol 3350 17 Gram(s) Oral daily  senna 2 Tablet(s) Oral at bedtime    MEDICATIONS  (PRN):  acetaminophen   Tablet .. 650 milliGRAM(s) Oral every 6 hours PRN Moderate Pain (4 - 6)  bisacodyl Suppository 10 milliGRAM(s) Rectal daily PRN Constipation  magnesium hydroxide Suspension 30 milliLiter(s) Oral daily PRN Constipation      Allergies    iodinated radiocontrast agents (Anaphylaxis)    Intolerances OVERNIGHT EVENTS: Hypertensive >200 in AM, given Labetalol 5mg IV    SUBJECTIVE: Patient seen and examined at the bedside. She says she is still having headaches and her legs cramp when they happen.    Vital Signs Last 12 Hrs  T(F): 98.1 (02-24-20 @ 06:00), Max: 98.5 (02-23-20 @ 21:59)  HR: 70 (02-24-20 @ 04:05) (70 - 72)  BP: 163/69 (02-24-20 @ 04:05) (122/59 - 163/69)  BP(mean): 99 (02-24-20 @ 04:05) (85 - 99)  RR: 18 (02-24-20 @ 04:05) (16 - 18)  SpO2: 100% (02-24-20 @ 04:05) (96% - 100%)  I&O's Summary      PHYSICAL EXAM:  General: In no acute distress, resting comfortably in bed  HEENT: NCAT, PERRL, EOMI, no conjunctival pallor or scleral icterus, MMM  Neck: Supple, no JVD  Respiratory: Clear to auscultation bilaterally with no wheezes, rales, or rhonchi appreciated  Cardiovascular: RRR, normal S1 and S2, no murmurs, rubs, or gallops appreciated  Vascular: 2+ radial and DP pulses  Abdomen: Soft, NT/ND. Bowel sounds present in all four quadrants with no guarding, rebound tenderness, or palpable masses  Extremities: Warm and well perfused. No clubbing, cyanosis, or edema noted  Skin: No gross skin abnormalities or rashes noted  Neuro:  - Mental Status:  AAOx3; speech is fluent with intact naming, repetition, and comprehension  - Cranial Nerves II-XII:    II:  PERRLA; visual fields are full to confrontation  III, IV, VI:  EOMI, no nystagmus  V:  facial sensation is intact in the V1-V3 distribution bilaterally.  VII:  face is symmetric with normal eye closure and smile  VIII:  hearing is intact to finger rub  IX, X:  uvula is midline and soft palate rises symmetrically  XI:  head turning and shoulder shrug are intact bilaterally  XII:  tongue protrudes in the midline  - Motor:  strength is 5/5 throughout; normal muscle bulk and tone throughout; no pronator drift  - Sensory:  intact to light touch bilaterally  - Coordination:  finger-nose-finger intact without dysmetria        LABS:                        11.7   4.39  )-----------( 194      ( 23 Feb 2020 05:49 )             35.3     02-23    135  |  101  |  9   ----------------------------<  101<H>  4.2   |  27  |  1.03    Ca    9.1      23 Feb 2020 05:49  Mg     2.5     02-23            RADIOLOGY & ADDITIONAL TESTS: Reviewed.    MEDICATIONS  (STANDING):  aspirin enteric coated 81 milliGRAM(s) Oral daily  atorvastatin 40 milliGRAM(s) Oral at bedtime  clopidogrel Tablet 75 milliGRAM(s) Oral daily  enoxaparin Injectable 40 milliGRAM(s) SubCutaneous every 24 hours  pantoprazole    Tablet 40 milliGRAM(s) Oral before breakfast  polyethylene glycol 3350 17 Gram(s) Oral daily  senna 2 Tablet(s) Oral at bedtime    MEDICATIONS  (PRN):  acetaminophen   Tablet .. 650 milliGRAM(s) Oral every 6 hours PRN Moderate Pain (4 - 6)  bisacodyl Suppository 10 milliGRAM(s) Rectal daily PRN Constipation  magnesium hydroxide Suspension 30 milliLiter(s) Oral daily PRN Constipation      Allergies    iodinated radiocontrast agents (Anaphylaxis)    Intolerances PGY-1 TRANSFER NOTE - 7LA TO [facility]    HOSPITAL COURSE  Ms. Garcia is a 89-year-old female with a past medical history of HTN, GERD, Asthma, CAD status post stent and PPM who presented with a 1-day history of headache, dizziness, sinus pressure, tinnitus, and left leg weakness. ED workup negative and admitted for further stroke workup. She underwent a repeat CT scan which was also negative. Her LLE weakness improved significantly and PT is recommending outpatient PT/OT at discharge. She has had a few episodes of hypertension requiring IV Labetalol 5mg with improvement. She continues to be hypertensive and having GERD symptoms. She is stable for stepdown to [facility] for further management of hypertension and GERD.    OVERNIGHT EVENTS: Hypertensive >200 in AM, given Labetalol 5mg IV    SUBJECTIVE: Patient seen and examined at the bedside. She says she is still having headaches and her legs cramp when they happen.    Vital Signs Last 12 Hrs  T(F): 98.1 (02-24-20 @ 06:00), Max: 98.5 (02-23-20 @ 21:59)  HR: 70 (02-24-20 @ 04:05) (70 - 72)  BP: 163/69 (02-24-20 @ 04:05) (122/59 - 163/69)  BP(mean): 99 (02-24-20 @ 04:05) (85 - 99)  RR: 18 (02-24-20 @ 04:05) (16 - 18)  SpO2: 100% (02-24-20 @ 04:05) (96% - 100%)  I&O's Summary      PHYSICAL EXAM:  General: In no acute distress, resting comfortably in bed  HEENT: NCAT, PERRL, EOMI, no conjunctival pallor or scleral icterus, MMM  Neck: Supple, no JVD  Respiratory: Clear to auscultation bilaterally with no wheezes, rales, or rhonchi appreciated  Cardiovascular: RRR, normal S1 and S2, no murmurs, rubs, or gallops appreciated  Vascular: 2+ radial and DP pulses  Abdomen: Soft, NT/ND. Bowel sounds present in all four quadrants with no guarding, rebound tenderness, or palpable masses  Extremities: Warm and well perfused. No clubbing, cyanosis, or edema noted  Skin: No gross skin abnormalities or rashes noted  Neuro:  - Mental Status:  AAOx3; speech is fluent with intact naming, repetition, and comprehension  - Cranial Nerves II-XII:    II:  PERRLA; visual fields are full to confrontation  III, IV, VI:  EOMI, no nystagmus  V:  facial sensation is intact in the V1-V3 distribution bilaterally.  VII:  face is symmetric with normal eye closure and smile  VIII:  hearing is intact to finger rub  IX, X:  uvula is midline and soft palate rises symmetrically  XI:  head turning and shoulder shrug are intact bilaterally  XII:  tongue protrudes in the midline  - Motor:  strength is 5/5 throughout; normal muscle bulk and tone throughout; no pronator drift  - Sensory:  intact to light touch bilaterally  - Coordination:  finger-nose-finger intact without dysmetria        LABS:                        11.7   4.39  )-----------( 194      ( 23 Feb 2020 05:49 )             35.3     02-23    135  |  101  |  9   ----------------------------<  101<H>  4.2   |  27  |  1.03    Ca    9.1      23 Feb 2020 05:49  Mg     2.5     02-23            RADIOLOGY & ADDITIONAL TESTS: Reviewed.    MEDICATIONS  (STANDING):  aspirin enteric coated 81 milliGRAM(s) Oral daily  atorvastatin 40 milliGRAM(s) Oral at bedtime  clopidogrel Tablet 75 milliGRAM(s) Oral daily  enoxaparin Injectable 40 milliGRAM(s) SubCutaneous every 24 hours  pantoprazole    Tablet 40 milliGRAM(s) Oral before breakfast  polyethylene glycol 3350 17 Gram(s) Oral daily  senna 2 Tablet(s) Oral at bedtime    MEDICATIONS  (PRN):  acetaminophen   Tablet .. 650 milliGRAM(s) Oral every 6 hours PRN Moderate Pain (4 - 6)  bisacodyl Suppository 10 milliGRAM(s) Rectal daily PRN Constipation  magnesium hydroxide Suspension 30 milliLiter(s) Oral daily PRN Constipation      Allergies    iodinated radiocontrast agents (Anaphylaxis)    Intolerances

## 2020-02-25 ENCOUNTER — TRANSCRIPTION ENCOUNTER (OUTPATIENT)
Age: 85
End: 2020-02-25

## 2020-02-25 VITALS — SYSTOLIC BLOOD PRESSURE: 160 MMHG | DIASTOLIC BLOOD PRESSURE: 79 MMHG | HEART RATE: 72 BPM

## 2020-02-25 PROCEDURE — 93306 TTE W/DOPPLER COMPLETE: CPT

## 2020-02-25 PROCEDURE — 97530 THERAPEUTIC ACTIVITIES: CPT

## 2020-02-25 PROCEDURE — 85027 COMPLETE CBC AUTOMATED: CPT

## 2020-02-25 PROCEDURE — 80061 LIPID PANEL: CPT

## 2020-02-25 PROCEDURE — 84484 ASSAY OF TROPONIN QUANT: CPT

## 2020-02-25 PROCEDURE — 80053 COMPREHEN METABOLIC PANEL: CPT

## 2020-02-25 PROCEDURE — 93005 ELECTROCARDIOGRAM TRACING: CPT

## 2020-02-25 PROCEDURE — 82962 GLUCOSE BLOOD TEST: CPT

## 2020-02-25 PROCEDURE — 36415 COLL VENOUS BLD VENIPUNCTURE: CPT

## 2020-02-25 PROCEDURE — 83036 HEMOGLOBIN GLYCOSYLATED A1C: CPT

## 2020-02-25 PROCEDURE — 86901 BLOOD TYPING SEROLOGIC RH(D): CPT

## 2020-02-25 PROCEDURE — 80307 DRUG TEST PRSMV CHEM ANLYZR: CPT

## 2020-02-25 PROCEDURE — 99285 EMERGENCY DEPT VISIT HI MDM: CPT | Mod: 25

## 2020-02-25 PROCEDURE — 97161 PT EVAL LOW COMPLEX 20 MIN: CPT

## 2020-02-25 PROCEDURE — 97116 GAIT TRAINING THERAPY: CPT

## 2020-02-25 PROCEDURE — 85025 COMPLETE CBC W/AUTO DIFF WBC: CPT

## 2020-02-25 PROCEDURE — 80048 BASIC METABOLIC PNL TOTAL CA: CPT

## 2020-02-25 PROCEDURE — 83735 ASSAY OF MAGNESIUM: CPT

## 2020-02-25 PROCEDURE — 86850 RBC ANTIBODY SCREEN: CPT

## 2020-02-25 PROCEDURE — 70450 CT HEAD/BRAIN W/O DYE: CPT

## 2020-02-25 PROCEDURE — 84443 ASSAY THYROID STIM HORMONE: CPT

## 2020-02-25 PROCEDURE — 97110 THERAPEUTIC EXERCISES: CPT

## 2020-02-25 PROCEDURE — 85610 PROTHROMBIN TIME: CPT

## 2020-02-25 PROCEDURE — 99233 SBSQ HOSP IP/OBS HIGH 50: CPT

## 2020-02-25 RX ORDER — AMLODIPINE BESYLATE 2.5 MG/1
1 TABLET ORAL
Qty: 30 | Refills: 1
Start: 2020-02-25 | End: 2020-04-24

## 2020-02-25 RX ADMIN — Medication 81 MILLIGRAM(S): at 13:33

## 2020-02-25 RX ADMIN — AMLODIPINE BESYLATE 10 MILLIGRAM(S): 2.5 TABLET ORAL at 05:44

## 2020-02-25 RX ADMIN — PANTOPRAZOLE SODIUM 40 MILLIGRAM(S): 20 TABLET, DELAYED RELEASE ORAL at 06:21

## 2020-02-25 RX ADMIN — Medication 650 MILLIGRAM(S): at 09:55

## 2020-02-25 RX ADMIN — CLOPIDOGREL BISULFATE 75 MILLIGRAM(S): 75 TABLET, FILM COATED ORAL at 13:33

## 2020-02-25 RX ADMIN — Medication 650 MILLIGRAM(S): at 10:30

## 2020-02-25 RX ADMIN — Medication 50 MILLIGRAM(S): at 13:33

## 2020-02-25 NOTE — DIETITIAN INITIAL EVALUATION ADULT. - PROBLEM SELECTOR PLAN 1
Patient presenting with a 1 day history of headache, dizziness, tinnitus, and left leg weakness. 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

## 2020-02-25 NOTE — DIETITIAN INITIAL EVALUATION ADULT. - ENERGY NEEDS
Ht: 5'5", Wt: 73.2kg, IBW: 56.8kg, 128.9%IBW.   Calculations done using IBW as pt's current wt is >120% IBW. Needs calculated based on [facility] standards of care. Needs adjusted for age. Upper end of kcal range should be used.

## 2020-02-25 NOTE — DISCHARGE NOTE NURSING/CASE MANAGEMENT/SOCIAL WORK - NSDCPEPTSTRK_GEN_ALL_CORE
Prescribed medications/Risk factors for stroke/Stroke warning signs and symptoms/Signs and symptoms of stroke/Call 911 for stroke/Need for follow up after discharge/Stroke education booklet/Stroke support groups for patients, families, and friends

## 2020-02-25 NOTE — DISCHARGE NOTE NURSING/CASE MANAGEMENT/SOCIAL WORK - NSDCFUADDAPPT_GEN_ALL_CORE_FT
You have an appointment scheduled with Dr. Delacruz's NP, Donn, on March 9 at 3PM    Please also see your PCP within a week to have your blood pressure checked.     Please continue with outpatient physical therapy and occupational therapy.

## 2020-02-25 NOTE — DISCHARGE NOTE NURSING/CASE MANAGEMENT/SOCIAL WORK - PATIENT PORTAL LINK FT
You can access the FollowMyHealth Patient Portal offered by [facility] by registering at the following website: [email or web address]. By joining [employer]’s FollowMyHealth portal, you will also be able to view your health information using other applications (apps) compatible with our system.

## 2020-02-25 NOTE — DIETITIAN INITIAL EVALUATION ADULT. - OTHER INFO
Pt seen for initial assessment. 89-year-old female with a past medical history of HTN, GERD, Asthma, CAD status post stent and PPM who is presenting today with a 1-day history of headache, dizziness, sinus pressure, tinnitus, and left leg weakness. ED workup negative and admitted for further stroke workup. Stepped down to [facility] for further management of hypertension and GERD. S/P GOC 2/25, DNR. Skin: 1+ edema b/l ankles, intact. Pt seen resting in bed, awake, alert. Pt is on DASH diet with fair appetite, although consumed 100% of breakfast tray this AM, observed at bedside. Reports good appetite PTA, lives alone and prepares own meals, avoids acidic and spicy foods 2/2 GERD, denies difficulty chewing/swallowing. NKFA. Denies N/V, endorses GERD, denies D, reports constipation last BM 2/23. Not reporting pain at this time. Reports wt has been stable >3 months ~149-150lbs, admit wt noted to be 161.6lbs. RD to follow up per protocol.

## 2020-02-25 NOTE — GOALS OF CARE CONVERSATION - ADVANCED CARE PLANNING - CONVERSATION DETAILS
I discussed with patient the diagnosis of possible stroke given her headache, tinnitus, and that these symptoms may improve over time (imaging was negative). Apart from her admitting symptoms, I asked the patient that despite her current medical status to be stable, what she would like her future treatment options regarding end of life care to be like. She stated that "I know God is good, and I am blessed to be living today. If the time comes, I would like to be by His side, without question. I do not want any life support, life saving measures. I surely do not want to be revived and be taken from being by His side when the time comes." Patient understands the process of CPR and airway intubation, and did not want both. Her next of kin is a son in [place], though her healthcare proxy is her grandson in [place].

## 2020-02-27 ENCOUNTER — INPATIENT (INPATIENT)
Facility: HOSPITAL | Age: 85
LOS: 4 days | Discharge: HOME HEALTH | DRG: 149 | End: 2020-03-03
Attending: INTERNAL MEDICINE | Admitting: INTERNAL MEDICINE
Payer: MEDICARE

## 2020-02-27 VITALS
RESPIRATION RATE: 18 BRPM | HEIGHT: 65 IN | SYSTOLIC BLOOD PRESSURE: 182 MMHG | TEMPERATURE: 98 F | DIASTOLIC BLOOD PRESSURE: 83 MMHG | OXYGEN SATURATION: 99 % | WEIGHT: 149.91 LBS | HEART RATE: 85 BPM

## 2020-02-27 DIAGNOSIS — K21.9 GASTRO-ESOPHAGEAL REFLUX DISEASE WITHOUT ESOPHAGITIS: ICD-10-CM

## 2020-02-27 DIAGNOSIS — D64.9 ANEMIA, UNSPECIFIED: ICD-10-CM

## 2020-02-27 DIAGNOSIS — I25.10 ATHEROSCLEROTIC HEART DISEASE OF NATIVE CORONARY ARTERY WITHOUT ANGINA PECTORIS: ICD-10-CM

## 2020-02-27 DIAGNOSIS — R42 DIZZINESS AND GIDDINESS: ICD-10-CM

## 2020-02-27 DIAGNOSIS — Z91.89 OTHER SPECIFIED PERSONAL RISK FACTORS, NOT ELSEWHERE CLASSIFIED: ICD-10-CM

## 2020-02-27 DIAGNOSIS — Z95.0 PRESENCE OF CARDIAC PACEMAKER: Chronic | ICD-10-CM

## 2020-02-27 DIAGNOSIS — R63.8 OTHER SYMPTOMS AND SIGNS CONCERNING FOOD AND FLUID INTAKE: ICD-10-CM

## 2020-02-27 DIAGNOSIS — R53.1 WEAKNESS: ICD-10-CM

## 2020-02-27 DIAGNOSIS — I10 ESSENTIAL (PRIMARY) HYPERTENSION: ICD-10-CM

## 2020-02-27 DIAGNOSIS — Z95.0 PRESENCE OF CARDIAC PACEMAKER: ICD-10-CM

## 2020-02-27 LAB
ALBUMIN SERPL ELPH-MCNC: 3.7 G/DL — SIGNIFICANT CHANGE UP (ref 3.3–5)
ALP SERPL-CCNC: 77 U/L — SIGNIFICANT CHANGE UP (ref 40–120)
ALT FLD-CCNC: 13 U/L — SIGNIFICANT CHANGE UP (ref 10–45)
ANION GAP SERPL CALC-SCNC: 11 MMOL/L — SIGNIFICANT CHANGE UP (ref 5–17)
APPEARANCE UR: CLEAR — SIGNIFICANT CHANGE UP
AST SERPL-CCNC: 20 U/L — SIGNIFICANT CHANGE UP (ref 10–40)
BASOPHILS # BLD AUTO: 0.03 K/UL — SIGNIFICANT CHANGE UP (ref 0–0.2)
BASOPHILS NFR BLD AUTO: 0.7 % — SIGNIFICANT CHANGE UP (ref 0–2)
BILIRUB SERPL-MCNC: 0.3 MG/DL — SIGNIFICANT CHANGE UP (ref 0.2–1.2)
BILIRUB UR-MCNC: NEGATIVE — SIGNIFICANT CHANGE UP
BUN SERPL-MCNC: 11 MG/DL — SIGNIFICANT CHANGE UP (ref 7–23)
CALCIUM SERPL-MCNC: 9.4 MG/DL — SIGNIFICANT CHANGE UP (ref 8.4–10.5)
CHLORIDE SERPL-SCNC: 103 MMOL/L — SIGNIFICANT CHANGE UP (ref 96–108)
CO2 SERPL-SCNC: 21 MMOL/L — LOW (ref 22–31)
COLOR SPEC: YELLOW — SIGNIFICANT CHANGE UP
CREAT SERPL-MCNC: 0.76 MG/DL — SIGNIFICANT CHANGE UP (ref 0.5–1.3)
DIFF PNL FLD: NEGATIVE — SIGNIFICANT CHANGE UP
EOSINOPHIL # BLD AUTO: 0.07 K/UL — SIGNIFICANT CHANGE UP (ref 0–0.5)
EOSINOPHIL NFR BLD AUTO: 1.5 % — SIGNIFICANT CHANGE UP (ref 0–6)
GLUCOSE SERPL-MCNC: 90 MG/DL — SIGNIFICANT CHANGE UP (ref 70–99)
GLUCOSE UR QL: NEGATIVE — SIGNIFICANT CHANGE UP
HCT VFR BLD CALC: 34.3 % — LOW (ref 34.5–45)
HGB BLD-MCNC: 11.9 G/DL — SIGNIFICANT CHANGE UP (ref 11.5–15.5)
IMM GRANULOCYTES NFR BLD AUTO: 0.2 % — SIGNIFICANT CHANGE UP (ref 0–1.5)
KETONES UR-MCNC: NEGATIVE — SIGNIFICANT CHANGE UP
LEUKOCYTE ESTERASE UR-ACNC: NEGATIVE — SIGNIFICANT CHANGE UP
LYMPHOCYTES # BLD AUTO: 1.9 K/UL — SIGNIFICANT CHANGE UP (ref 1–3.3)
LYMPHOCYTES # BLD AUTO: 41.7 % — SIGNIFICANT CHANGE UP (ref 13–44)
MAGNESIUM SERPL-MCNC: 2.3 MG/DL — SIGNIFICANT CHANGE UP (ref 1.6–2.6)
MCHC RBC-ENTMCNC: 24.7 PG — LOW (ref 27–34)
MCHC RBC-ENTMCNC: 34.7 GM/DL — SIGNIFICANT CHANGE UP (ref 32–36)
MCV RBC AUTO: 71.3 FL — LOW (ref 80–100)
MONOCYTES # BLD AUTO: 0.3 K/UL — SIGNIFICANT CHANGE UP (ref 0–0.9)
MONOCYTES NFR BLD AUTO: 6.6 % — SIGNIFICANT CHANGE UP (ref 2–14)
NEUTROPHILS # BLD AUTO: 2.25 K/UL — SIGNIFICANT CHANGE UP (ref 1.8–7.4)
NEUTROPHILS NFR BLD AUTO: 49.3 % — SIGNIFICANT CHANGE UP (ref 43–77)
NITRITE UR-MCNC: NEGATIVE — SIGNIFICANT CHANGE UP
NRBC # BLD: 0 /100 WBCS — SIGNIFICANT CHANGE UP (ref 0–0)
PH UR: 7.5 — SIGNIFICANT CHANGE UP (ref 5–8)
PLATELET # BLD AUTO: 185 K/UL — SIGNIFICANT CHANGE UP (ref 150–400)
POTASSIUM SERPL-MCNC: 4 MMOL/L — SIGNIFICANT CHANGE UP (ref 3.5–5.3)
POTASSIUM SERPL-SCNC: 4 MMOL/L — SIGNIFICANT CHANGE UP (ref 3.5–5.3)
PROT SERPL-MCNC: 7.2 G/DL — SIGNIFICANT CHANGE UP (ref 6–8.3)
PROT UR-MCNC: NEGATIVE MG/DL — SIGNIFICANT CHANGE UP
RBC # BLD: 4.81 M/UL — SIGNIFICANT CHANGE UP (ref 3.8–5.2)
RBC # FLD: 16.7 % — HIGH (ref 10.3–14.5)
SODIUM SERPL-SCNC: 135 MMOL/L — SIGNIFICANT CHANGE UP (ref 135–145)
SP GR SPEC: 1.01 — SIGNIFICANT CHANGE UP (ref 1–1.03)
UROBILINOGEN FLD QL: 0.2 E.U./DL — SIGNIFICANT CHANGE UP
WBC # BLD: 4.56 K/UL — SIGNIFICANT CHANGE UP (ref 3.8–10.5)
WBC # FLD AUTO: 4.56 K/UL — SIGNIFICANT CHANGE UP (ref 3.8–10.5)

## 2020-02-27 PROCEDURE — 93280 PM DEVICE PROGR EVAL DUAL: CPT | Mod: 26

## 2020-02-27 PROCEDURE — 99285 EMERGENCY DEPT VISIT HI MDM: CPT

## 2020-02-27 PROCEDURE — 70450 CT HEAD/BRAIN W/O DYE: CPT | Mod: 26

## 2020-02-27 PROCEDURE — 93010 ELECTROCARDIOGRAM REPORT: CPT

## 2020-02-27 PROCEDURE — 99222 1ST HOSP IP/OBS MODERATE 55: CPT | Mod: GC

## 2020-02-27 RX ORDER — SENNA PLUS 8.6 MG/1
2 TABLET ORAL
Qty: 0 | Refills: 0 | DISCHARGE

## 2020-02-27 RX ORDER — CLOPIDOGREL BISULFATE 75 MG/1
75 TABLET, FILM COATED ORAL DAILY
Refills: 0 | Status: DISCONTINUED | OUTPATIENT
Start: 2020-02-27 | End: 2020-03-03

## 2020-02-27 RX ORDER — ACETAMINOPHEN 500 MG
650 TABLET ORAL EVERY 6 HOURS
Refills: 0 | Status: DISCONTINUED | OUTPATIENT
Start: 2020-02-27 | End: 2020-03-03

## 2020-02-27 RX ORDER — LABETALOL HCL 100 MG
5 TABLET ORAL ONCE
Refills: 0 | Status: DISCONTINUED | OUTPATIENT
Start: 2020-02-27 | End: 2020-02-27

## 2020-02-27 RX ORDER — AMLODIPINE BESYLATE 2.5 MG/1
10 TABLET ORAL DAILY
Refills: 0 | Status: DISCONTINUED | OUTPATIENT
Start: 2020-02-27 | End: 2020-02-29

## 2020-02-27 RX ORDER — ENOXAPARIN SODIUM 100 MG/ML
40 INJECTION SUBCUTANEOUS EVERY 24 HOURS
Refills: 0 | Status: DISCONTINUED | OUTPATIENT
Start: 2020-02-27 | End: 2020-03-03

## 2020-02-27 RX ORDER — ATORVASTATIN CALCIUM 80 MG/1
80 TABLET, FILM COATED ORAL AT BEDTIME
Refills: 0 | Status: DISCONTINUED | OUTPATIENT
Start: 2020-02-27 | End: 2020-03-03

## 2020-02-27 RX ORDER — ASPIRIN/CALCIUM CARB/MAGNESIUM 324 MG
81 TABLET ORAL DAILY
Refills: 0 | Status: DISCONTINUED | OUTPATIENT
Start: 2020-02-27 | End: 2020-03-03

## 2020-02-27 RX ORDER — METOPROLOL TARTRATE 50 MG
50 TABLET ORAL DAILY
Refills: 0 | Status: DISCONTINUED | OUTPATIENT
Start: 2020-02-27 | End: 2020-03-03

## 2020-02-27 RX ORDER — SENNA PLUS 8.6 MG/1
2 TABLET ORAL AT BEDTIME
Refills: 0 | Status: DISCONTINUED | OUTPATIENT
Start: 2020-02-27 | End: 2020-03-03

## 2020-02-27 RX ORDER — PANTOPRAZOLE SODIUM 20 MG/1
40 TABLET, DELAYED RELEASE ORAL
Refills: 0 | Status: DISCONTINUED | OUTPATIENT
Start: 2020-02-27 | End: 2020-03-03

## 2020-02-27 RX ADMIN — SENNA PLUS 2 TABLET(S): 8.6 TABLET ORAL at 22:19

## 2020-02-27 RX ADMIN — ATORVASTATIN CALCIUM 80 MILLIGRAM(S): 80 TABLET, FILM COATED ORAL at 22:19

## 2020-02-27 RX ADMIN — ENOXAPARIN SODIUM 40 MILLIGRAM(S): 100 INJECTION SUBCUTANEOUS at 22:19

## 2020-02-27 NOTE — H&P ADULT - PROBLEM SELECTOR PLAN 4
On home protonix 40 qd.   - c/w home med PPM dependent and with last interrogation on 2/21 showing normal function. PPM system from 2 different companies (generator and RA lead from [employer] and RV lead from [employer]). MRI compatible but per previous note PPM needs to be reprogrammed to DOO and has to be on cardiac monitor during MRI.   - consult EP for PPM interrogation and order MRI per their recs PPM dependent and with last interrogation on 2/21 showing normal function. PPM system from 2 different companies (generator and RA lead from [employer] and RV lead from [employer]). MRI compatible but per previous note PPM needs to be reprogrammed to DOO and has to be on cardiac monitor during MRI.   - EKG this admission with paced rhythm @ 70bpms  - consult EP for PPM interrogation and order MRI per their recs

## 2020-02-27 NOTE — CONSULT NOTE ADULT - SUBJECTIVE AND OBJECTIVE BOX
**STROKE CODE CONSULT NOTE**    HPI:  89F with PMHx of HTN, GERD, Asthma, CAD, recently admitted for stroke workup (CT  negative, -) presents to [facility] ED with dizziness and weakness. Patient reports sudden onset weakness and dizziness accompanied with headache and bilateral lower extremity numbness (L>R), which started 6am this morning. Patient also endorses episodes of "hot flashes" and "leg tightening" that she states come about when her BP was in the systolic 200s this morning. Neuro/stroke consulted as patient was recently admitted to the stroke unit for similar symptoms last week, recommended at discharge to follow up with PMD for better BP control, cleared from the neuro/stroke standpoint to be discharged . NIHSS 1 for mild sensory loss, no tPA given due to non-disabling symptoms. Patient currently denies difficulty talking, slurred speech, visual changes, and blurry vision.       PAST MEDICAL & SURGICAL HISTORY:  Pacemaker  Hemorrhoid  Essential hypertension: Hypertension  Pulmonary embolism with infarction: Pulmonary embolism  Atherosclerosis of coronary artery: CAD (coronary artery disease)  Gastroesophageal reflux disease: GERD (gastroesophageal reflux disease)  Artificial pacemaker  Status post total hysterectomy: S/P hysterectomy      FAMILY HISTORY:  Problems influencing health status: No family history of coronary artery disease      SOCIAL HISTORY:  Denies smoking, drinking, or drug use    ROS:  Constitutional: No fever, weight loss or fatigue  Eyes: No eye pain, visual disturbances, or discharge  ENMT:  No difficulty hearing, tinnitus, vertigo; No sinus or throat pain  Neck: No pain or stiffness  Respiratory: No cough, wheezing, chills or hemoptysis  Cardiovascular: No chest pain, palpitations, shortness of breath or leg swelling  Gastrointestinal: No abdominal pain. No nausea, vomiting or hematemesis; No diarrhea or constipation. No hematochezia.  Genitourinary: No dysuria, frequency, hematuria or incontinence  Neurological: As per HPI      MEDICATIONS  (STANDING):    MEDICATIONS  (PRN):      Allergies  iodinated radiocontrast agents (Anaphylaxis)      Vital Signs Last 24 Hrs  T(C): 36.6 (2020 17:21), Max: 36.8 (2020 09:02)  T(F): 97.8 (2020 17:21), Max: 98.2 (2020 09:02)  HR: 69 (2020 17:21) (68 - 85)  BP: 151/81 (2020 17:21) (142/72 - 189/87)  BP(mean): --  RR: 16 (2020 17:21) (16 - 18)  SpO2: 98% (2020 17:21) (97% - 99%)    PHYSICAL EXAM:  Constitutional: WDWN; NAD    Neurologic:  -Mental status: Awake, alert and oriented to person, age, and month. Speech is fluent with intact naming, able to describe picture in full.  Recent and remote memory intact.  Attention/concentration intact.  No dysarthria, no aphasia.  Fund of knowledge appropriate.      -Cranial nerves:  II: Visual fields full to confrontation and finger counting. Pupils PERRL b/l.  III, IV, VI: Extraocular movements are intact without nystagmus.  V: Facial sensation intact V1 through V3 intact bilaterally.  VII: No facial droop. Face is symmetric with normal eye closure and smile.  -Motor:  No pronator drift. Normal bulk and tone, strength 5/5 in bilateral upper and lower extremities.   strength 5/5.    -Sensation: Decreased sensation along both lower extremities. No neglect.   -Coordination: No dysmetria on finger-to-nose b/l.   -Gait: Deferred.     NIHSS: 1, no tPA for nondisabling symptoms    Fingerstick Blood Glucose: CAPILLARY BLOOD GLUCOSE      POCT Blood Glucose.: 99 mg/dL (2020 09:00)       LABS:                        11.9   4.56  )-----------( 185      ( 2020 11:54 )             34.3         135  |  103  |  11  ----------------------------<  90  4.0   |  21<L>  |  0.76    Ca    9.4      2020 10:28    TPro  7.2  /  Alb  3.7  /  TBili  0.3  /  DBili  x   /  AST  20  /  ALT  13  /  AlkPhos  77          Urinalysis Basic - ( 2020 11:22 )    Color: Yellow / Appearance: Clear / S.015 / pH: x  Gluc: x / Ketone: NEGATIVE  / Bili: Negative / Urobili: 0.2 E.U./dL   Blood: x / Protein: NEGATIVE mg/dL / Nitrite: NEGATIVE   Leuk Esterase: NEGATIVE / RBC: x / WBC x   Sq Epi: x / Non Sq Epi: x / Bacteria: x        RADIOLOGY & ADDITIONAL STUDIES:  - CTH   < from: CT Head No Cont (20 @ 11:31) >  IMPRESSION: No acute intracranial hemorrhage or transcortical infarction.    - CTH (last admission)  < from: CT Head No Cont (20 @ 14:13) >  IMPRESSION:   1. No acute intracranial hemorrhage or transcortical infarct.  2. Age-appropriate volume loss and chronic microangiopathic disease.          ASSESSMENT/PLAN:  89F with PMHx of HTN, GERD, Asthma, CAD, recently admitted for stroke workup (CT  negative, -) presents to [facility] ED with dizziness, generalized weakness, bilateral lower extremity numbness (L>R) and episodes of "hot flashes" that she states come about when her BP was in the systolic 200s. Neuro/stroke consulted as patient was recently admitted to dtroke unit for similar symptoms last week, recommended to follow up with PMD for better BP control. NIHSS 1, no tPA given due to non-disabling symptoms. CTH  negative for infarct or hemorrhage. Given presentation and negative imaging, low suspicion for stroke. Pursue other differential, recommend medicine consult for BP management.     - No need for further stroke imaging at this time.   - Advise medicine consult, needs BP control.   - Dispo per ED. **STROKE CODE CONSULT NOTE**    HPI:  89F with PMHx of HTN, GERD, Asthma, CAD, recently admitted for stroke workup (CT  negative, -) presents to [facility] ED with dizziness and weakness. Patient reports sudden onset weakness and dizziness accompanied with headache and bilateral lower extremity numbness (L>R), which started 6am this morning. Patient also endorses episodes of "hot flashes" and "leg tightening" that she states come about when her BP was in the systolic 200s this morning. Neuro/stroke consulted as patient was recently admitted to the stroke unit for similar symptoms last week, recommended at discharge to follow up with PMD for better BP control, cleared from the neuro/stroke standpoint to be discharged . NIHSS 1 for mild sensory loss, no tPA given due to non-disabling symptoms. Patient currently denies difficulty talking, slurred speech, visual changes, and blurry vision.       PAST MEDICAL & SURGICAL HISTORY:  Pacemaker  Hemorrhoid  Essential hypertension: Hypertension  Pulmonary embolism with infarction: Pulmonary embolism  Atherosclerosis of coronary artery: CAD (coronary artery disease)  Gastroesophageal reflux disease: GERD (gastroesophageal reflux disease)  Artificial pacemaker  Status post total hysterectomy: S/P hysterectomy      FAMILY HISTORY:  Problems influencing health status: No family history of coronary artery disease      SOCIAL HISTORY:  Denies smoking, drinking, or drug use    ROS:  Constitutional: No fever, weight loss or fatigue  Eyes: No eye pain, visual disturbances, or discharge  ENMT:  No difficulty hearing, tinnitus, vertigo; No sinus or throat pain  Neck: No pain or stiffness  Respiratory: No cough, wheezing, chills or hemoptysis  Cardiovascular: No chest pain, palpitations, shortness of breath or leg swelling  Gastrointestinal: No abdominal pain. No nausea, vomiting or hematemesis; No diarrhea or constipation. No hematochezia.  Genitourinary: No dysuria, frequency, hematuria or incontinence  Neurological: As per HPI      MEDICATIONS  (STANDING):    MEDICATIONS  (PRN):      Allergies  iodinated radiocontrast agents (Anaphylaxis)      Vital Signs Last 24 Hrs  T(C): 36.6 (2020 17:21), Max: 36.8 (2020 09:02)  T(F): 97.8 (2020 17:21), Max: 98.2 (2020 09:02)  HR: 69 (2020 17:21) (68 - 85)  BP: 151/81 (2020 17:21) (142/72 - 189/87)  BP(mean): --  RR: 16 (2020 17:21) (16 - 18)  SpO2: 98% (2020 17:21) (97% - 99%)    PHYSICAL EXAM:  Constitutional: WDWN; NAD    Neurologic:  -Mental status: Awake, alert and oriented to person, age, and month. Speech is fluent with intact naming, able to describe picture in full.  Recent and remote memory intact.  Attention/concentration intact.  No dysarthria, no aphasia.  Fund of knowledge appropriate.      -Cranial nerves:  II: Visual fields full to confrontation and finger counting. Pupils PERRL b/l.  III, IV, VI: Extraocular movements are intact without nystagmus.  V: Facial sensation intact V1 through V3 intact bilaterally.  VII: No facial droop. Face is symmetric with normal eye closure and smile.  -Motor:  No pronator drift. Normal bulk and tone, strength 5/5 in bilateral upper and lower extremities.   strength 5/5.    -Sensation: Decreased sensation along both lower extremities. No neglect.   -Coordination: No dysmetria on finger-to-nose b/l.   -Gait: Deferred.     NIHSS: 1, no tPA for nondisabling symptoms    Fingerstick Blood Glucose: CAPILLARY BLOOD GLUCOSE      POCT Blood Glucose.: 99 mg/dL (2020 09:00)       LABS:                        11.9   4.56  )-----------( 185      ( 2020 11:54 )             34.3         135  |  103  |  11  ----------------------------<  90  4.0   |  21<L>  |  0.76    Ca    9.4      2020 10:28    TPro  7.2  /  Alb  3.7  /  TBili  0.3  /  DBili  x   /  AST  20  /  ALT  13  /  AlkPhos  77          Urinalysis Basic - ( 2020 11:22 )    Color: Yellow / Appearance: Clear / S.015 / pH: x  Gluc: x / Ketone: NEGATIVE  / Bili: Negative / Urobili: 0.2 E.U./dL   Blood: x / Protein: NEGATIVE mg/dL / Nitrite: NEGATIVE   Leuk Esterase: NEGATIVE / RBC: x / WBC x   Sq Epi: x / Non Sq Epi: x / Bacteria: x        RADIOLOGY & ADDITIONAL STUDIES:  - CTH   < from: CT Head No Cont (20 @ 11:31) >  IMPRESSION: No acute intracranial hemorrhage or transcortical infarction.    - CTH (last admission)  < from: CT Head No Cont (20 @ 14:13) >  IMPRESSION:   1. No acute intracranial hemorrhage or transcortical infarct.  2. Age-appropriate volume loss and chronic microangiopathic disease.          ASSESSMENT/PLAN:  89F with PMHx of HTN, GERD, Asthma, CAD, recently admitted for stroke workup (CT  negative, -) presents to [facility] ED with dizziness, generalized weakness, bilateral lower extremity numbness (L>R) and episodes of "hot flashes" that she states come about when her BP was in the systolic 200s. Neuro/stroke consulted as patient was recently admitted to dtroke unit for similar symptoms last week, recommended to follow up with PMD for better BP control. NIHSS 1, no tPA given due to non-disabling symptoms. CTH  negative for infarct or hemorrhage. Given presentation and negative imaging, low suspicion for stroke. Pursue other differential, recommend medicine consult for BP management.     - No need for further stroke imaging at this time.   - Plan remains the same from discharge, recommend seeing PMD and follow-up with outpatient neurology in Dr. Delacruz's office to determine further workup.   - Advise medicine consult, needs BP control.   - Dispo per ED.

## 2020-02-27 NOTE — ED PROVIDER NOTE - CLINICAL SUMMARY MEDICAL DECISION MAKING FREE TEXT BOX
PT is a 89 y/o F w/ PMH HTN, GERD, asthma, CAD s/p stent presenting w/ sudden onset weakness and dizziness accompanied with HA and lower extremity numbness (L>R) starting at 6am. Pt was able to stand and walk but felt she was moving slower. Given history and symptoms, consider stroke, UTI, metabolic or  vestibular pathology. Plan: labs (CBC, CMP, gluc), UA, CT head no contrast, EKG, PT eval.

## 2020-02-27 NOTE — ED PROVIDER NOTE - ATTENDING CONTRIBUTION TO CARE
89 y/o with HTN, GERD, asthma, CAD s/p stent with dizziness and weakness. St B/P has been labile despite compliance. Head CT- negative. Neuro consulted and st no new findings from inpt. visit 2 days ago.  Patient may benefit from MRI but will confirm compliance with pacemaker. No sig lab abn. PT consulted pt with unsteady gait. Patient lives alone  and is a fall risk at home. Case discussed with case management.

## 2020-02-27 NOTE — ED ADULT NURSE NOTE - NSIMPLEMENTINTERV_GEN_ALL_ED
Implemented All Universal Safety Interventions:  [place] to call system. Call bell, personal items and telephone within reach. Instruct patient to call for assistance. Room bathroom lighting operational. Non-slip footwear when patient is off stretcher. Physically safe environment: no spills, clutter or unnecessary equipment. Stretcher in lowest position, wheels locked, appropriate side rails in place. Specific interventions were implemented:

## 2020-02-27 NOTE — H&P ADULT - ASSESSMENT
89F w PMH HTN, CAD s/p stent, PPM, GERD, hx of hemorrhoids, who presents for 1 day of dizziness, lightheadedness and unsteadiness.

## 2020-02-27 NOTE — H&P ADULT - NSHPREVIEWOFSYSTEMS_GEN_ALL_CORE
Denies chest pain, sob, palpitations, visual changes, diarrhea, melena, hematochezia, fevers, chills, cough, dysuria. Denies chest pain, sob, palpitations, visual changes, diarrhea, melena, hematochezia, fevers, chills, cough, dysuria.  12pt ROS otherwise negative.

## 2020-02-27 NOTE — ED ADULT NURSE REASSESSMENT NOTE - NS ED NURSE REASSESS COMMENT FT1
Transfer of care acknowledged with bedside rounding, lab results reviewed, will continue with plan of care.

## 2020-02-27 NOTE — PHYSICAL THERAPY INITIAL EVALUATION ADULT - PERTINENT HX OF CURRENT PROBLEM, REHAB EVAL
Pt is a 89 yo female presenting w/ dizziness and weakness. Pt had sudden onset weakness and dizziness accompanied with HA and lower extremity numbness (L>R)

## 2020-02-27 NOTE — ED ADULT NURSE NOTE - ED CARDIAC RHYTHM
[facility] AMBULATORY ENCOUNTER  RHEUMATOLOGY OFFICE VISIT    CHIEF COMPLAINT:   Chief Complaint   Patient presents with   • Follow-up     Elevated antinuclear antibody (HARPREET) level        HISTORY OF PRESENT ILLNESS:   Maranda Garcia is a 33 year old female who presents for a follow-up of elevated HARPREET and trochanteric bursitis.      PROBLEMS:  1. Elevated HARPREET at 1:80 with a homogenous and nucleolar pattern moderate fatigue and history of rash on scalp and knees which she believes is \"psoriasis\" and family history of psoriasis in her mother with occasional pain in elbows, shoulders, right trochanteric bursa, neck and thoracic spine.     We discussed potential etiologies such as psoriatic arthritis, systemic sclerosis and undifferentiated connective tissue disease.     We discussed that the slight elevation in sedimentation rate could be due to weight but also could be due to potential inflammation.     Upon further discussion of potential etiologies she became quite anxious and was not able to carry on any further discussion. The appointment was then stopped due to her anxiety due to \"what could be wrong\". I suggest we follow up in 2 months and talk further.     2. Rash/Possible psoriasis on her knees and scalp with family history of psoriasis in her mother.   She notes she's never seen a dermatologist for this.     We discussed potential dermatology consultation.     3. Family history of psoriasis in mother.  Patient Active Problem List   Diagnosis   • Hyperlipemia   • Polycystic ovarian syndrome   • RLS (restless legs syndrome)   • Depression   • Personality disorder   • Asthma   • Moderate sleep apnea   • Morbid obesity with BMI of 60.0-69.9, adult (CMS/[facility])   • Low iron stores   • Delayed sleep phase syndrome   • Unspecified episodic mood disorder   • Tobacco use   • PTSD (post-traumatic stress disorder)   • Elevated antinuclear antibody (HARPREET) level   • Rash   • Family history of psoriasis in mother   • Pain of  left heel   • Left foot pain   • Centromere antibody positive   • Trochanteric bursitis of right hip       PAST HISTORIES:   I have reviewed the past medical history, family history, social history, medications and allergies listed in the medical record as obtained by my nursing staff and support staff and agree with their documentation.  ALLERGIES:   Allergen Reactions   • Advil [Ibuprofen] HIVES     Hives, Rash, difficult breathing   • Ciprofloxacin HIVES   • Latex RASH   • Zithromax [Azithromycin Dihydrate] HIVES     Current Outpatient Prescriptions   Medication Sig Dispense Refill   • hydrOXYzine (ATARAX) 25 MG tablet Take 1-2 po q 4 hr prn 60 tablet 1   • escitalopram (LEXAPRO) 20 MG tablet Take 1 tablet by mouth daily. 90 tablet 1   • lamoTRIgine (LAMICTAL) 100 MG tablet Take 2.5 tablets by mouth daily. 225 tablet 1   • lovastatin (MEVACOR) 40 MG tablet Take 1 tablet by mouth nightly. 90 tablet 3   • metroNIDAZOLE (METROCREAM) 0.75 % cream Apply to affected areas on face twice daily as needed 45 g 5   • eflornithine 13.9 % cream Apply topically 2 times daily. 45 g 5   • Vitamin D, Ergocalciferol, 86550 UNITS capsule Take 1 capsule by mouth once a week. 10 capsule 0   • Ascorbic Acid (VITAMIN C PO)      • DISPENSE Pair of knee high compression stockings. 1 each 0   • hydrocortisone (PROCTOSOL HC) 2.5 % rectal cream Apply thin layer to external rectal area 3 times daily for one week. 30 g 0   • PROAIR  (90 BASE) MCG/ACT inhaler INHALE 2 PUFFS EVERY 4 HOURS AS NEEDED FOR SHORTNESS OF BREATH OR WHEEZING 8.5 g 0   • vitamin E 400 UNIT capsule Take 2 capsules by mouth daily. 60 capsule 5   • rOPINIRole (REQUIP) 1 MG tablet TAKE 1 TAB 2-3 HOURS BEFORE BEDTIME. 30 tablet 5   • ferrous sulfate 325 (65 FE) MG tablet Take 325 mg by mouth daily (with breakfast).     • predniSONE (DELTASONE) 10 MG tablet Take 5 po today, then 4 po day 2, then 3 po day 3, then 2 po day 4, 1 po day 5, then stop. 15 tablet 0     No  current facility-administered medications for this visit.      Past Medical History:   Diagnosis Date   • Asthma    • Calculus of distal left ureter 11/3/2015    Passed, 80% Ca Phos   • Depression    • Hyperlipemia 2/4/2013   • Inflammatory bowel disease jan 2016    coming for egd and colonoscopy   • PCOS (polycystic ovarian syndrome) 2/4/2013    follows with Dr Jenkins   • Personality disorder     follows with Dr Sampson, therapist Shannan Salvador.   • PTSD (post-traumatic stress disorder) 9/2/2016   • RLS (restless legs syndrome)     follows with Dr Hutchison    • UTI (urinary tract infection)      Past Surgical History:   Procedure Laterality Date   • COLONOSCOPY DIAGNOSTIC  1/26/16    Dr. Camejo-normal   • ESOPHAGOGASTRODUODENOSCOPY TRANSORAL FLEX W/BX SINGLE OR MULT  1/26/16    Dr. Camejo-gastritis   • LAPAROSCOPY, CHOLECYSTECTOMY  3/2/2016    Dr. Lopez   • NECK SURGERY      ganglion mass   • PAST SURGICAL HISTORY      PSH of vein surgery on the left side of her neck as a child   • REFRACTIVE SURGERY     • TYMPANOSTOMY     • WISDOM TOOTH EXTRACTION       Family History   Problem Relation Age of Onset   • Asthma Sister    • Cancer Maternal Grandmother 90     bowel cancer?   • Depression Mother    • Arthritis Mother    • Psoriasis Mother    • Depression Father    • Depression Brother    • Arthritis Maternal Aunt    • NEGATIVE FAMILY HX OF Other             INTERVAL HISTORY:  The patient returns on 6/28/2017 for followup of the above-mentioned rheumatological problems.      Since the last visit her symptoms have been getting better .  She does have  and joint pain, predominantly affecting the feet/heel (left worse then the right), right trochanteric bursa.  The pain has been intermittent.  The pain is worse in the evening.  The pain is pressure and minimal throbbing in nature.  The pain is rated as 2 on a good day, 4 on a bad day, and 2 on average. It is aggravated by standing for long periods of time and  walking a lot.  The pain is alleviated by sitting with feet elevated, a couple ibuprofen, massaging feet.  She does have joint warmth of left heel.  She does experience morning stiffness of back which lasts until moving around.   She has difficulty with Sifting cat liter.  She uses no assistive devices.  She does experience fatigue. She states the fatigue is moderate but does note that she has insomnia and has a lot of stress in her life right now attributing to her fatigue.  Denies fevers, chills or night sweats.  She has noted weight stability.      REVIEW OF SYSTEMS:    All other systems are reviewed and are negative except as documented above.     PHYSICAL EXAM:   Vital Signs:    Visit Vitals  /80 (BP Location: [facility], Patient Position: Sitting, Cuff Size: Regular)   Pulse 80   Resp 18   Ht 5' 5\" (1.651 m)   Wt (!) 167.2 kg Comment: Patient's last weight was incorrectly documented.   BMI 61.34 kg/m²     General:  Alert, awake, oriented x3, not in acute distress.  Pleasant and appears stated age who is obese.  Skin:  No palpable purpura, psoriasis or livedo on the upper and lower extremities.  HEENT:  Pink palpebral conjunctivae, anicteric sclerae.  No malar rash.  No heliotrope rash.  Extremities:  No bipedal edema.  No sclerodactyly, periungual erythema, nail pitting, subcutaneous nodules, fat pad atrophy or digital ulcerations.  No Gottron's papule or telangiectasia.  No bilateral puffy fingers. No periungual nailfold changes.  Neurological:  No focal muscle weakness or sensory deficits on the upper and lower extremities.  Musculoskeletal:   Complains of tenderness of the fifth metacarpal phalange on the right and the third distal interphalange on the left with palpation. All distal interphalangeals, proximal interphalangeals and metacarpophalangeals with good range of motion without synovial thickening.  Ulnar aspect of the left wrist tender to palpation. Wrists and elbows with good range of motion without  swelling.  Shoulders with good range of motion without pain on motion.  Hips with good range of motion without pain on motion.  Knees are cool without effusion.  Normal range of motion.  Ankles are nontender without swelling.  Normal range of motion.  Metatarsals nontender to palpation bilaterally.  Palpation of the spine from the cervical to the lower lumbar spine elicits no pain.  No tenderness along bilateral sacroiliac joints.  No tenderness of bilateral trochanteric bursa.  In total,  0 of 18 tender points and  0 swollen joints.      LAB RESULTS:   Lab Results   Component Value Date    WBC 5.9 01/14/2017    RBC 4.63 01/14/2017    HGB 12.2 01/14/2017    HCT 37.8 01/14/2017    MCV 81.6 01/14/2017    MCH 26.3 01/14/2017    MCHC 32.3 01/14/2017    RDWCV 14.6 01/14/2017    SEG 67 01/14/2017    TLYMPH 26 01/14/2017    PMON 7 01/14/2017    PEOS 0 01/14/2017    PBASO 0 01/14/2017    ANEUT 4.0 01/14/2017    ALYMS 1.5 01/14/2017    COBY 0.4 01/14/2017    AEOS 0.0 (L) 01/14/2017    ABASO 0.0 01/14/2017     ESR (mm/hr)   Date Value   01/14/2017 25 (H)   02/27/2016 21 (H)     C-REACTIVE PROTEIN (mg/dL)   Date Value   01/14/2017 0.8     VITAMIND, 25 HYDROXY (ng/mL)   Date Value   01/14/2017 9.1 (L)     Lab Results   Component Value Date    SODIUM 140 01/14/2017    POTASSIUM 4.0 01/14/2017    CHLORIDE 109 (H) 01/14/2017    CO2 20 (L) 01/14/2017    CREATININE 0.67 01/14/2017    TOTPROTEIN 6.3 (L) 01/14/2017    ALBUMIN 3.7 01/14/2017    CALCIUM 8.9 01/14/2017    BILIRUBIN 0.4 01/14/2017    ALKPT 126 (H) 01/14/2017    GPT 67 01/14/2017    BCRAT 15 01/14/2017    GLOB 2.6 01/14/2017    GFRNA >90 01/14/2017    GFRA >90 01/14/2017     HARPREET SCREEN WITH REFL (no units)   Date Value   01/14/2017 POSITIVE (A)     HARPREET TITER (1:NN)   Date Value   01/14/2017 80 (H)     HARPREET PATTERN (no units)   Date Value   01/14/2017 MIXED HOMOGENEOUS AND NUCLEOLAR     ANTI CENTROMERE AB 1.5  0 - 0.9 AI Final 01/14/2017  9:01 AM       ASSESSMENT:   1.  Elevated ИВАН at 1:80 with a homogenous and nucleolar pattern with positive anti-centromere antibody, moderate fatigue with occasional pain in elbows, shoulders, neck and thoracic spine.     We discussed potential etiologies such as psoriatic arthritis, systemic sclerosis and undifferentiated connective tissue disease.  She has had a difficult time dealing with her diagnosis or potential diagnosis.  We will deal with the situation carefully.    We discussed that the slight elevation in sedimentation rate could be due to weight but also could be potential inflammation.     She remains anxious over potential etiologies.     2. Rash/Possible psoriasis on her knees and scalp with family history of psoriasis in her mother.   She notes she's never seen a dermatologist for this.     I placed a referral for a dermatology consultation.     3. Family history of psoriasis in mother.     4. Right trochanteric bursitis.    I did offer a cortisone injection into her right trochanteric bursa and she will consider that for the future but at this point she feels it is not as bad.      5. Left heel pain.    Will get an angiotensin converting enzyme to further evaluate for sarcoidosis.    6. Vitamin D deficiency with level of 9.1.    She informs me that she has had not taken her vitamin D supplementation. I did stress the importance of continuing with supplementation.      PLAN:  Orders Placed This Encounter   • Angiotensin Converting Enzyme   • SERVICE TO PODIATRY       Return in about 1 month (around 7/28/2017) for Elevated иван.    Instructions provided as documented in the After Visit Summary (AVS).    The patient indicated understanding of the diagnosis and agreed with the plan of care.   regular

## 2020-02-27 NOTE — PHYSICAL THERAPY INITIAL EVALUATION ADULT - MODALITIES TREATMENT COMMENTS
CN Testing: B/L Frontalis intact; B/L buccinator intact; smile symmetrical; tongue protrusion at midline; B/L eyes open/close intact; PT eval completed. Pt tolerates eval poorly. Pt only tolerate sitting at EOB for~ 4 mins, unable to sit longer and perform transfer/gait training due to increased dizziness and blood pressure( blood pressure 196/80 in sitting ), Dr Ochoa is notified post PT session.  Functionally, pt demonstrates impaired bed mobility, decreased sitting tolerance. Further skilled PT assessment is needed to determine appropriate DC Plan for pt. Locomotion FIM: 0

## 2020-02-27 NOTE — H&P ADULT - PROBLEM SELECTOR PLAN 7
F: PO  E: replete prn  N: DASH TLC  DVT ppx: Lovenox Hgb 11.9 on admission, with no s/s of bleed.  - F/u iron studies in AM  - transfuse for hgb <7   - T+S ordered for AM

## 2020-02-27 NOTE — H&P ADULT - PROBLEM SELECTOR PLAN 8
- PCP Contacted on Admission: (Y/N) --> Name & Phone #: Dr Mckeon [address]  - Date of Contact with PCP: F: PO  E: replete prn  N: DASH TLC  DVT ppx: Lovenox

## 2020-02-27 NOTE — ED ADULT NURSE NOTE - OBJECTIVE STATEMENT
patient alert and oriented x 3 came BIBA c/o dizziness , generalized weakness , nausea and headache since monday got worse today , was seen in the ER monday for the same reason . Denies any vomiting , fever nor chills ,Seen and examined by Dr. Ochoa , not in distress . safety maintained , will continue to monitor .

## 2020-02-27 NOTE — ED ADULT NURSE NOTE - INTERVENTIONS DEFINITIONS
Physically safe environment: no spills, clutter or unnecessary equipment/Provide visual cue, wrist band, yellow gown, etc./Stretcher in lowest position, wheels locked, appropriate side rails in place/Instruct patient to call for assistance

## 2020-02-27 NOTE — H&P ADULT - PROBLEM SELECTOR PLAN 9
- PCP Contacted on Admission: (Y/N) --> Name & Phone #: Dr Mckeon [address]  - Date of Contact with PCP:

## 2020-02-27 NOTE — PHYSICAL THERAPY INITIAL EVALUATION ADULT - SENSORY TESTS
CN Testing: B/L Frontalis intact; B/L buccinator intact; smile symmetrical; tongue protrusion at midline; B/L eyes open/close intact;

## 2020-02-27 NOTE — H&P ADULT - PROBLEM SELECTOR PLAN 3
On Norvasc 10, Toprol XL 50 qd at home. Presented with HTN urgency /87 and HA but no On Norvasc 10, Toprol XL 50 qd at home and reports she took her medications this AM. Presented with HTN urgency /87 and HA, dizziness. No visual changes, CP, SOB, palpitations. No signs of end organ damage on labs  - continue home meds  - IV labetalol 5mg pushes for SBP >180   - re titration of home antihypertensive regimen and close PCP follow up upon discharge On Norvasc 10, Toprol XL 50 qd at home and reports she took her medications this AM. Presented with HTN urgency /87 and HA, dizziness. No visual changes, CP, SOB, palpitations. No signs of end organ damage on labs. Of note, patient with labile BPs which become transiently elevated and then improved without any intervention.   - continue home meds  - IV labetalol 5mg pushes for SBP persistently >180   - re titration of home antihypertensive regimen and close PCP follow up upon discharge

## 2020-02-27 NOTE — PHYSICAL THERAPY INITIAL EVALUATION ADULT - LEVEL OF INDEPENDENCE: SIT/STAND, REHAB EVAL
secondary pt complains increased dizziness with sitting and start leaning back, vital signs taken in flow sheet, PT session terminated due to increased blood pressure and  pt's symptom, Dr Ochoa is notified post PT session/unable to perform

## 2020-02-27 NOTE — PHYSICAL THERAPY INITIAL EVALUATION ADULT - ADDITIONAL COMMENTS
As per pt, pt lives alone in an apartment with 1 flight walk up(~8 steps), Prior to admission, pt ambulated independently with SC.

## 2020-02-27 NOTE — ED ADULT NURSE REASSESSMENT NOTE - NS ED NURSE REASSESS COMMENT FT1
Patient pending admission. Patient resting comfortably with family members at bedside and aware with plan of care.

## 2020-02-27 NOTE — H&P ADULT - PROBLEM SELECTOR PLAN 6
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 PPM system from 2 different companies (generator and RA lead from [employer] and RV lead from [employer]). MRI compatible but per previous note PPM needs to be reprogrammed to DOO and has to be on cardiac monitor during MRI.   - consult EP for PPM interrogation and order MRI per their recs On ASA 81 at home, started on plavix last admission.  - c/w ASA 81, Plavix 75 daily

## 2020-02-27 NOTE — ED PROVIDER NOTE - OBJECTIVE STATEMENT
PT is a 89 y/o F w/ PMH HTN, GERD, asthma, CAD s/p stent presenting w/ dizziness and weakness. Pt had sudden onset weakness and dizziness accompanied with HA and lower extremity numbness (L>R) starting at 6am. Pt was able to stand and walk but felt she was moving slower. Pt endorses gastrointestinal reflux pain 7/0 and some nausea, and denies trauma, CP, SOB, vomiting.    Of note, pt was recently admitted at [facility] for HA, dizziness, sinus pressure, tinnitus 2/20-2/25. CT head 2/20 and 2/21 negative, TTE/TSH, A1C/lipids WNL.

## 2020-02-27 NOTE — ED CLERICAL - NS ED CLERK NOTE PRE-ARRIVAL INFORMATION; ADDITIONAL PRE-ARRIVAL INFORMATION
This patient is enrolled in the Stroke STAR readmission reduction program and has active care navigation. This patient can be followed up by the care navigation team within 24 hours. To arrange close follow-up or to obtain additional clinical information about this patient, please call the contact number above.  Please contact Dr. Melissa Delacruz () or Dr. Elena Gonzalez () if admission is being considered prior to the final disposition decision

## 2020-02-27 NOTE — H&P ADULT - PROBLEM SELECTOR PLAN 2
On previous admission presented with a 1-day history of headache, dizziness, sinus pressure, tinnitus, and left leg weakness. CT imaging negative for acute stroke and her LLE weakness improved significantly with inpatient PT.   Uses cane at home and in ED pt determined to be unsteady on her feet and with 4/5 LLE strength.   - PT eval and treat  - fall precautions, ambulate w assistance

## 2020-02-27 NOTE — H&P ADULT - PROBLEM SELECTOR PLAN 5
On ASA 81 at home, started on plavix last admission.  - c/w ASA 81, Plavix 75 daily On home protonix 40 qd.   - c/w home med

## 2020-02-27 NOTE — PHYSICAL THERAPY INITIAL EVALUATION ADULT - GENERAL OBSERVATIONS, REHAB EVAL
Pt received supine in bed, +hep-lock, NAD, friend present, Pt left as found, NAD, friend present. Dr Ocoha is notified about pt's vital sign during PT session and pts' functional status.

## 2020-02-27 NOTE — ED ADULT NURSE NOTE - CHPI ED NUR SYMPTOMS NEG
no nausea/no fever/no loss of consciousness/no numbness/no blurred vision/no change in level of consciousness/no vomiting/no confusion

## 2020-02-27 NOTE — ED ADULT NURSE REASSESSMENT NOTE - NS ED NURSE REASSESS COMMENT FT1
Dr. Cardona at bedside discussing plan of care with patient and family members at bedside, will continue with plan of care.

## 2020-02-27 NOTE — H&P ADULT - NSHPPHYSICALEXAM_GEN_ALL_CORE
VITAL SIGNS:  T(C): 36.9 (02-27-20 @ 18:40), Max: 36.9 (02-27-20 @ 18:40)  T(F): 98.4 (02-27-20 @ 18:40), Max: 98.4 (02-27-20 @ 18:40)  HR: 70 (02-27-20 @ 18:40) (68 - 85)  BP: 179/76 (02-27-20 @ 18:40) (142/72 - 189/87)  BP(mean): --  RR: 18 (02-27-20 @ 18:40) (16 - 18)  SpO2: 96% (02-27-20 @ 18:40) (96% - 99%)  Wt(kg): --    PHYSICAL EXAM:    Constitutional: elderly female resting comfortably in bed; NAD  Head: NC/AT  Eyes: PERRL, EOMI, anicteric sclera, no nystagmus   ENT: no nasal discharge; MMM  Neck: supple; no JVD or thyromegaly  Respiratory: CTA B/L; no W/R/R, no retractions. Breath sounds diminished at B/L bases   Cardiac: +S1/S2; RRR; no M/R/G; PMI non-displaced  Gastrointestinal: abdomen soft, NT/ND; no rebound or guarding; +BSx4  Genitourinary: no alvarez  Back: spine midline, no bony tenderness or step-offs; no CVAT B/L  Extremities: WWP, no clubbing or cyanosis; no peripheral edema  Musculoskeletal: no joint swelling, tenderness or erythema  Vascular: radial and DP pulses palpable B/L  Dermatologic: skin warm, dry and intact; no rashes  Lymphatic: no submandibular or cervical LAD  Neurologic: AAOx3; CNII-XII grossly intact; no focal deficits. Strength UE 5/5, RLE 5/5, LLE 4/5. B/L LE sensation intact   Psychiatric: affect and characteristics of appearance, verbalizations, behaviors are appropriate VITAL SIGNS:  T(C): 36.9 (02-27-20 @ 18:40), Max: 36.9 (02-27-20 @ 18:40)  T(F): 98.4 (02-27-20 @ 18:40), Max: 98.4 (02-27-20 @ 18:40)  HR: 70 (02-27-20 @ 18:40) (68 - 85)  BP: 179/76 (02-27-20 @ 18:40) (142/72 - 189/87)  BP(mean): --  RR: 18 (02-27-20 @ 18:40) (16 - 18)  SpO2: 96% (02-27-20 @ 18:40) (96% - 99%)  Wt(kg): --    PHYSICAL EXAM:    Constitutional: elderly female resting comfortably in bed; NAD  Head: NC/AT  Eyes: PERRL, EOMI, anicteric sclera, no nystagmus   ENT: no nasal discharge; MMM  Neck: supple; no JVD or thyromegaly  Respiratory: CTA B/L; no W/R/R, no retractions. Breath sounds diminished at B/L bases   Cardiac: +S1/S2; RRR; no M/R/G; PMI non-displaced  Gastrointestinal: abdomen soft, NT/ND; no rebound or guarding; +BSx4  Genitourinary: no alvarez  Back: spine midline, no bony tenderness or step-offs; no CVAT B/L  Extremities: WWP, no clubbing or cyanosis; no peripheral edema  Musculoskeletal: no joint swelling, tenderness or erythema  Vascular: radial and DP pulses palpable B/L  Dermatologic: skin warm, dry and intact; no rashes  Lymphatic: no submandibular or cervical LAD  Neurologic: AAOx3; CNII-XII grossly intact; no focal deficits. Strength UE 5/5, RLE 5/5, LLE 4/5. B/L LE sensation intact. No dysmetria.   Psychiatric: affect and characteristics of appearance, verbalizations, behaviors are appropriate

## 2020-02-27 NOTE — H&P ADULT - PROBLEM SELECTOR PLAN 1
Presenting with 1d of dizziness, HA. Worse with head movement, positional changes, and accompanied by B/L tinnitus and unsteadiness while walking. With known prior LLE weakness from prior admission which had improved upon discharge from most recent admission few days ago. Presenting with 1d of dizziness, HA. Worse with head movement, positional changes, and accompanied by B/L tinnitus and unsteadiness while walking. With known prior LLE weakness from prior admission which had improved upon discharge from most recent admission few days ago. CT head at that time negative for stroke however no MRI done. BPs transiently elevated to 189/87 in ED which improved w/o additional antihypertensives, orthostatic negative in ED. EKG with paced rhythm. Possibly 2/2 HTN vs PPM dysfunction vs BPPV.   - CT head this admission with no evidence of acute stroke, showed chronic microvascular changes, B/L catracts   - consult EP for PPM interrogation, and programming prior to MRI  - MRI Head Non con, obtain collateral from [facility] Re: MRI in 10/2019 Presenting with 1d of dizziness, HA. Worse with head movement, positional changes, and accompanied by B/L tinnitus and unsteadiness while walking. With known prior LLE weakness from prior admission which had improved upon discharge from most recent admission few days ago. CT head at that time negative for stroke however no MRI done. BPs transiently elevated to 189/87 in ED which improved w/o additional antihypertensives, orthostatic negative in ED. EKG with paced rhythm. Possibly 2/2 HTN vs PPM dysfunction vs BPPV.   - CT head this admission with no evidence of acute stroke, showed chronic microvascular changes, B/L cataracts, mild to moderate calcifications of B/L carotid arteries    - consult EP for PPM interrogation, and programming prior to MRI  - MRI Head Non con, obtain collateral from [facility] Re: MRI in 10/2019 Presenting with 1d of dizziness, room spinning sensation, HA. Worse with head movement, positional changes, and accompanied by B/L tinnitus and unsteadiness while walking. With known prior LLE weakness from prior admission which had improved upon discharge from most recent admission few days ago. CT head at that time negative for stroke however no MRI done. BPs transiently elevated to 189/87 in ED which improved w/o additional antihypertensives, orthostatic negative in ED. EKG with paced rhythm. Possibly 2/2 HTN vs PPM dysfunction vs BPPV.   - CT head this admission with no evidence of acute stroke, showed chronic microvascular changes, B/L cataracts, mild to moderate calcifications of B/L carotid arteries    - consult EP for PPM interrogation, and programming prior to MRI  - MRI Head Non con, obtain collateral from [facility] Re: MRI in 10/2019  - repeat orthostatic testing   - consider prn meclizine Presenting with 1d of acutely worsened vertigo in setting of 5year history of similar symptoms. Worse with head movement, positional changes, and accompanied by B/L tinnitus and unsteadiness while walking. With known prior LLE weakness from prior admission which had improved upon discharge from most recent admission few days ago. CT head at that time negative for stroke however no MRI done. BPs transiently elevated to 189/87 in ED which improved w/o additional antihypertensives, orthostatic negative in ED. EKG with paced rhythm. Possibly 2/2 HTN vs PPM dysfunction vs BPPV.   - CT head this admission with no evidence of acute stroke, showed chronic microvascular changes, B/L cataracts, mild to moderate calcifications of B/L carotid arteries    - consult EP for PPM interrogation, and programming prior to MRI  - MRI Head Non con, obtain collateral from [facility] Re: MRI in 10/2019  - repeat orthostatic testing   - consider prn meclizine Presenting with 1d of acutely worsened vertigo in setting of 5year history of similar symptoms. Worse with head movement, positional changes, and accompanied by B/L tinnitus and unsteadiness while walking. With known prior LLE weakness from prior admission which had improved upon discharge from most recent admission few days ago. CT head at that time negative for stroke however no MRI done. BPs transiently elevated to 189/87 in ED which improved w/o additional antihypertensives, orthostatic negative in ED. EKG with paced rhythm. Possibly 2/2 HTN vs PPM dysfunction vs BPPV.   - CT head this admission with no evidence of acute stroke, showed chronic microvascular changes, B/L cataracts, mild to moderate calcifications of B/L carotid arteries    - consult EP for PPM interrogation, and programming prior to MRI  - MRI Head Non con, obtain collateral from [facility] Re: MRI in 10/2019  - repeat orthostatic testing   - consider prn meclizine  - consider AM neuro consult

## 2020-02-27 NOTE — ED PROVIDER NOTE - CONSTITUTIONAL, MLM
normal... Well appearing, awake, alert, oriented to person, place, time/situation and in no apparent distress. declines

## 2020-02-27 NOTE — H&P ADULT - NSHPLABSRESULTS_GEN_ALL_CORE
LABS:                         11.9   4.56  )-----------( 185      ( 2020 11:54 )             34.3         135  |  103  |  11  ----------------------------<  90  4.0   |  21<L>  |  0.76    Ca    9.4      2020 10:28    TPro  7.2  /  Alb  3.7  /  TBili  0.3  /  DBili  x   /  AST  20  /  ALT  13  /  AlkPhos  77        Urinalysis Basic - ( 2020 11:22 )    Color: Yellow / Appearance: Clear / S.015 / pH: x  Gluc: x / Ketone: NEGATIVE  / Bili: Negative / Urobili: 0.2 E.U./dL   Blood: x / Protein: NEGATIVE mg/dL / Nitrite: NEGATIVE   Leuk Esterase: NEGATIVE / RBC: x / WBC x   Sq Epi: x / Non Sq Epi: x / Bacteria: x                RADIOLOGY, EKG & ADDITIONAL TESTS: Reviewed.   < from: CT Head No Cont (20 @ 11:31) >    VENTRICLES AND SULCI: The ventricles and cortical sulci are mildly enlarged consistent with age-appropriate parenchymal volume loss.  INTRA-AXIAL: No intracranial mass, acute hemorrhage, or midline shift is present. The gray-white differentiation is preserved without acute transcortical infarction. Patchy periventricular hypodensities consistent with chronic microvascular ischemia.  EXTRA-AXIAL: No extra-axial fluid collection is present.   VISUALIZED SINUSES: No air-fluid levels are identified.   VISUALIZED MASTOIDS: Clear.  CALVARIUM: Hyperostosis frontalis interna.  MISCELLANEOUS: Bilateral absent ocular lenses consistent with prior cataract surgery. Mild to moderate calcifications within the cavernous and clinoid segments of the bilateral internal carotid arteries.    IMPRESSION: No acute intracranial hemorrhage or transcortical infarction.    < end of copied text >

## 2020-02-27 NOTE — H&P ADULT - HISTORY OF PRESENT ILLNESS
89F w PMH HTN, CAD s/p stent, PPM, GERD, hx of hemorrhoids, who presents for 1 day of dizziness, lightheadedness and unsteadiness. Pt reports that this morning she was in her usual state of health and took all of her medications prior to eating breakfast. She subsequently felt hot, sweaty, and began to feel dizzy. She also endorsed a HA, sharp pain on the left side of her neck, felt LLE numbness and tingling and nausea w/o emesis. She when she attempted to get up, she continued to feel dizzy and felt unsteady on her feet so she called her friend from [employer] who subsequently brought her to [facility] ER. She reports her dizziness is worse with positional changes, worsens with sudden head movement, and that she has b/l chronic ringing sensation in her ears.     Of note, patient was recently dc'd few days ago for similar admission and was admitted for stroke workup. CT imaging at that time was negative for workup and MATT was negative for PFO, however pt did not have an MRI. Pt reports she had an MRI in 10/2019 at [facility] (PPM in place at time of MRI) for similar symptoms and "nothing was found".    She was evaluated in ED by neuro PA who determined NIHSS 1 and rec'd CT head which was negative for acute stroke, but positive for chronic microvascular ischemic changes, B/L cataracts.     ED Vitals: T 97.4-98.2, HR 68-85, -72 - 189/87, orthostatic testing negative, RR 16-18, SPO2 97-99  ED Labs: WBC 4.56, Hgb 11.9, Plt 185. CMP electrolytes WNL, BUN 11, Cr .76, LFTs WNL. UA negative. EKG with paced rhythm.   ED treatment: None  ED consults: Neurology 89F w PMH HTN, CAD s/p stent, PPM, GERD, hx of hemorrhoids, who presents for 1 day of dizziness, lightheadedness and unsteadiness on her feet. Pt reports that this morning she was in her usual state of health and took all of her medications prior to eating breakfast. She subsequently felt hot, sweaty, and began to feel dizzy. She also endorsed a HA, sharp pain on the left side of her neck, felt LLE numbness and tingling and nausea w/o emesis. She when she attempted to get up, she continued to feel dizzy and felt unsteady on her feet so she called her friend from [employer] who subsequently brought her to [facility] ER. She reports her dizziness is worse with positional changes, worsens with sudden head movement, and that she has b/l chronic ringing sensation in her ears.     Of note, patient was recently dc'd few days ago for similar admission and was admitted for stroke workup. CT imaging at that time was negative for workup and MATT was negative for PFO, however pt did not have an MRI. Pt reports she had an MRI in 10/2019 at [facility] (PPM in place at time of MRI) for similar symptoms and "nothing was found".    She was evaluated in ED by neuro PA who determined NIHSS 1 and rec'd CT head which was negative for acute stroke, but positive for chronic microvascular ischemic changes, B/L cataracts.     ED Vitals: T 97.4-98.2, HR 68-85, -72 - 189/87, orthostatic testing negative, RR 16-18, SPO2 97-99  ED Labs: WBC 4.56, Hgb 11.9, Plt 185. CMP electrolytes WNL, BUN 11, Cr .76, LFTs WNL. UA negative. EKG with paced rhythm.   ED treatment: None  ED consults: Neurology 89F w PMH HTN, CAD s/p stent, PPM, GERD, hx of hemorrhoids, who presents for 1 day of dizziness, lightheadedness and unsteadiness on her feet. Pt reports that this morning she was in her usual state of health and took all of her medications prior to eating breakfast. She subsequently felt hot, sweaty, and began to feel dizzy and that the room was spinning. She also endorsed a HA, sharp pain on the left side of her neck, felt LLE numbness and tingling and nausea w/o emesis. She when she attempted to get up, she continued to feel dizzy and felt unsteady on her feet so she called her friend from [employer] who subsequently brought her to [facility] ER. She reports her dizziness is worse with positional changes, worsens with sudden head movement, and that she has b/l chronic ringing sensation in her ears.     Of note, patient was recently dc'd few days ago for similar admission and was admitted for stroke workup. CT imaging at that time was negative for workup and MATT was negative for PFO, however pt did not have an MRI. Pt reports she had an MRI in 10/2019 at [facility] (PPM in place at time of MRI) for similar symptoms and "nothing was found".    She was evaluated in ED by neuro PA who determined NIHSS 1, CT head was obtained was negative for acute stroke, but positive for chronic microvascular ischemic changes, B/L cataracts.     ED Vitals: T 97.4-98.2, HR 68-85, /87 - 142/72, orthostatic testing negative, RR 16-18, SPO2 97-99  ED Labs: WBC 4.56, Hgb 11.9, Plt 185. CMP electrolytes WNL, BUN 11, Cr .76, LFTs WNL. UA negative. EKG with paced rhythm.   ED treatment: None   ED consults: Neurology 89F w PMH HTN, CAD s/p stent, PPM, GERD, hx of hemorrhoids, who presents for 1 day of acutely worsening dizziness described as vertigo and unsteadiness on her feet. Pt reports that this morning she was in her usual state of health and took all of her medications prior to eating breakfast. She subsequently felt hot, sweaty, and began to feel dizzy and that the room was spinning. She also endorsed a HA, sharp pain on the left side of her neck, felt LLE numbness and tingling and nausea w/o emesis. She when she attempted to get up, she continued to feel dizzy and felt unsteady on her feet so she called her friend from [employer] who subsequently brought her to [facility] ER. She reports her dizziness is worse with positional changes, worsens with sudden head movement, and that she has b/l chronic ringing sensation in her ears. She has had these vertigo symptoms [id number] that fluctuate in severity and tinnitus x1 year. Denies blurry vision, ear fullness or URI symptoms.    Of note, patient was recently dc'd few days ago for similar admission and was admitted for stroke workup. CT imaging at that time was negative for workup and MATT was negative for PFO, however pt did not have an MRI. Pt reports she had an MRI in 10/2019 at [facility] (PPM in place at time of MRI) for similar symptoms and "nothing was found".    She was evaluated in ED by neuro PA who determined NIHSS 1, CT head was obtained was negative for acute stroke, but positive for chronic microvascular ischemic changes, B/L cataracts.     ED Vitals: T 97.4-98.2, HR 68-85, /87 - 142/72, orthostatic testing negative, RR 16-18, SPO2 97-99  ED Labs: WBC 4.56, Hgb 11.9, Plt 185. CMP electrolytes WNL, BUN 11, Cr .76, LFTs WNL. UA negative. EKG with paced rhythm.   ED treatment: None   ED consults: Neurology

## 2020-02-27 NOTE — ED ADULT TRIAGE NOTE - CHIEF COMPLAINT QUOTE
PT BIBA c/o " still feeling weak since Monday" pt reports being here on Monday for dizziness. FS 99.

## 2020-02-28 ENCOUNTER — TRANSCRIPTION ENCOUNTER (OUTPATIENT)
Age: 85
End: 2020-02-28

## 2020-02-28 LAB
ANION GAP SERPL CALC-SCNC: 13 MMOL/L — SIGNIFICANT CHANGE UP (ref 5–17)
BLD GP AB SCN SERPL QL: NEGATIVE — SIGNIFICANT CHANGE UP
BUN SERPL-MCNC: 11 MG/DL — SIGNIFICANT CHANGE UP (ref 7–23)
CALCIUM SERPL-MCNC: 9.5 MG/DL — SIGNIFICANT CHANGE UP (ref 8.4–10.5)
CHLORIDE SERPL-SCNC: 103 MMOL/L — SIGNIFICANT CHANGE UP (ref 96–108)
CO2 SERPL-SCNC: 24 MMOL/L — SIGNIFICANT CHANGE UP (ref 22–31)
CREAT SERPL-MCNC: 0.83 MG/DL — SIGNIFICANT CHANGE UP (ref 0.5–1.3)
FERRITIN SERPL-MCNC: 23 NG/ML — SIGNIFICANT CHANGE UP (ref 15–150)
GLUCOSE SERPL-MCNC: 89 MG/DL — SIGNIFICANT CHANGE UP (ref 70–99)
HCT VFR BLD CALC: 33.4 % — LOW (ref 34.5–45)
HGB BLD-MCNC: 11.5 G/DL — SIGNIFICANT CHANGE UP (ref 11.5–15.5)
IRON SATN MFR SERPL: 29 % — SIGNIFICANT CHANGE UP (ref 14–50)
IRON SATN MFR SERPL: 76 UG/DL — SIGNIFICANT CHANGE UP (ref 30–160)
MAGNESIUM SERPL-MCNC: 2.2 MG/DL — SIGNIFICANT CHANGE UP (ref 1.6–2.6)
MCHC RBC-ENTMCNC: 24.8 PG — LOW (ref 27–34)
MCHC RBC-ENTMCNC: 34.4 GM/DL — SIGNIFICANT CHANGE UP (ref 32–36)
MCV RBC AUTO: 72.1 FL — LOW (ref 80–100)
NRBC # BLD: 0 /100 WBCS — SIGNIFICANT CHANGE UP (ref 0–0)
PLATELET # BLD AUTO: 169 K/UL — SIGNIFICANT CHANGE UP (ref 150–400)
POTASSIUM SERPL-MCNC: 4.3 MMOL/L — SIGNIFICANT CHANGE UP (ref 3.5–5.3)
POTASSIUM SERPL-SCNC: 4.3 MMOL/L — SIGNIFICANT CHANGE UP (ref 3.5–5.3)
RBC # BLD: 4.63 M/UL — SIGNIFICANT CHANGE UP (ref 3.8–5.2)
RBC # FLD: 16.9 % — HIGH (ref 10.3–14.5)
RH IG SCN BLD-IMP: POSITIVE — SIGNIFICANT CHANGE UP
SODIUM SERPL-SCNC: 140 MMOL/L — SIGNIFICANT CHANGE UP (ref 135–145)
TIBC SERPL-MCNC: 262 UG/DL — SIGNIFICANT CHANGE UP (ref 220–430)
UIBC SERPL-MCNC: 186 UG/DL — SIGNIFICANT CHANGE UP (ref 110–370)
WBC # BLD: 4.2 K/UL — SIGNIFICANT CHANGE UP (ref 3.8–10.5)
WBC # FLD AUTO: 4.2 K/UL — SIGNIFICANT CHANGE UP (ref 3.8–10.5)

## 2020-02-28 PROCEDURE — 99233 SBSQ HOSP IP/OBS HIGH 50: CPT | Mod: GC

## 2020-02-28 RX ORDER — SODIUM CHLORIDE 9 MG/ML
500 INJECTION INTRAMUSCULAR; INTRAVENOUS; SUBCUTANEOUS ONCE
Refills: 0 | Status: COMPLETED | OUTPATIENT
Start: 2020-02-28 | End: 2020-02-28

## 2020-02-28 RX ADMIN — PANTOPRAZOLE SODIUM 40 MILLIGRAM(S): 20 TABLET, DELAYED RELEASE ORAL at 05:56

## 2020-02-28 RX ADMIN — AMLODIPINE BESYLATE 10 MILLIGRAM(S): 2.5 TABLET ORAL at 05:56

## 2020-02-28 RX ADMIN — CLOPIDOGREL BISULFATE 75 MILLIGRAM(S): 75 TABLET, FILM COATED ORAL at 11:39

## 2020-02-28 RX ADMIN — SODIUM CHLORIDE 1000 MILLILITER(S): 9 INJECTION INTRAMUSCULAR; INTRAVENOUS; SUBCUTANEOUS at 10:36

## 2020-02-28 RX ADMIN — ENOXAPARIN SODIUM 40 MILLIGRAM(S): 100 INJECTION SUBCUTANEOUS at 18:59

## 2020-02-28 RX ADMIN — Medication 50 MILLIGRAM(S): at 05:55

## 2020-02-28 RX ADMIN — ATORVASTATIN CALCIUM 80 MILLIGRAM(S): 80 TABLET, FILM COATED ORAL at 21:36

## 2020-02-28 RX ADMIN — Medication 81 MILLIGRAM(S): at 11:39

## 2020-02-28 RX ADMIN — Medication 30 MILLILITER(S): at 21:39

## 2020-02-28 RX ADMIN — SENNA PLUS 2 TABLET(S): 8.6 TABLET ORAL at 21:36

## 2020-02-28 NOTE — PROGRESS NOTE ADULT - SUBJECTIVE AND OBJECTIVE BOX
SUBJECTIVE / INTERVAL HPI: Patient seen and examined at bedside.     VITAL SIGNS:  Vital Signs Last 24 Hrs  T(C): 36.9 (2020 10:04), Max: 36.9 (2020 18:40)  T(F): 98.5 (2020 10:04), Max: 98.5 (2020 10:04)  HR: 70 (2020 10:04) (68 - 70)  BP: 138/72 (2020 10:13) (130/68 - 189/87)  BP(mean): --  RR: 18 (2020 10:04) (16 - 18)  SpO2: 98% (2020 10:04) (96% - 99%)        PHYSICAL EXAM:  General: a very pleasant elderly woman, NAD, Laying comfortably in bed  HEENT: NC/AT, anicteric sclera, MMM  Neck: supple  Cardiovascular: +S1/S2, RRR, No murmurs, rubs, gallops  Respiratory: CTA B/L, no W/R/R  Gastrointestinal: soft, NT/ND, +BSx4  Extremities: WWP, no edema, clubbing or cyanosis  Vascular: 2+ radial, DP/PT pulses B/L  Neurological: AAOx3, no focal deficits    MEDICATIONS  (STANDING):  amLODIPine   Tablet 10 milliGRAM(s) Oral daily  aspirin  chewable 81 milliGRAM(s) Oral daily  atorvastatin 80 milliGRAM(s) Oral at bedtime  clopidogrel Tablet 75 milliGRAM(s) Oral daily  enoxaparin Injectable 40 milliGRAM(s) SubCutaneous every 24 hours  metoprolol succinate ER 50 milliGRAM(s) Oral daily  pantoprazole    Tablet 40 milliGRAM(s) Oral before breakfast  senna 2 Tablet(s) Oral at bedtime    MEDICATIONS  (PRN):  acetaminophen   Tablet .. 650 milliGRAM(s) Oral every 6 hours PRN Moderate Pain (4 - 6)    Allergies    iodinated radiocontrast agents (Anaphylaxis)    Intolerances        LABS:                        11.5   4.20  )-----------( 169      ( 2020 06:45 )             33.4     02-28    140  |  103  |  11  ----------------------------<  89  4.3   |  24  |  0.83    Ca    9.5      2020 06:45  Mg     2.2         TPro  7.2  /  Alb  3.7  /  TBili  0.3  /  DBili  x   /  AST  20  /  ALT  13  /  AlkPhos  77        Urinalysis Basic - ( 2020 11:22 )    Color: Yellow / Appearance: Clear / S.015 / pH: x  Gluc: x / Ketone: NEGATIVE  / Bili: Negative / Urobili: 0.2 E.U./dL   Blood: x / Protein: NEGATIVE mg/dL / Nitrite: NEGATIVE   Leuk Esterase: NEGATIVE / RBC: x / WBC x   Sq Epi: x / Non Sq Epi: x / Bacteria: x      CAPILLARY BLOOD GLUCOSE      POCT Blood Glucose.: 99 mg/dL (2020 09:00)          RADIOLOGY & ADDITIONAL TESTS: Reviewed. SUBJECTIVE / INTERVAL HPI: Patient seen and examined at bedside. Patient today says she feels better than yesterday. However, still reports some dizziness. She also says her L leg from below the knee has decreased sensation as compared to her R leg. She denies chest pain, SOB, abdominal pain, vision changes, headache, diarrhea, constipation.     VITAL SIGNS:  Vital Signs Last 24 Hrs  T(C): 36.9 (2020 10:04), Max: 36.9 (2020 18:40)  T(F): 98.5 (2020 10:04), Max: 98.5 (2020 10:04)  HR: 70 (2020 10:04) (68 - 70)  BP: 138/72 (2020 10:13) (130/68 - 189/87)  BP(mean): --  RR: 18 (2020 10:04) (16 - 18)  SpO2: 98% (2020 10:04) (96% - 99%)        PHYSICAL EXAM:  General: a very pleasant elderly woman, NAD, laying comfortably in bed   HEENT: NC/AT, anicteric sclera, MMM  Neck: supple  Cardiovascular: +S1/S2, RRR, no murmurs or gallops  Respiratory: CTA B/L, no W/R/R  Gastrointestinal: soft, NT/ND, +BSx4  Extremities: WWP, no edema or cyanosis  Vascular: 2+ radial, DP/PT pulses B/L  Neurological: AAOx3, no focal deficits, strength 5/5 UE and LE bilaterally, decreased sensation L lower extremity from the knee and below.     MEDICATIONS  (STANDING):  amLODIPine   Tablet 10 milliGRAM(s) Oral daily  aspirin  chewable 81 milliGRAM(s) Oral daily  atorvastatin 80 milliGRAM(s) Oral at bedtime  clopidogrel Tablet 75 milliGRAM(s) Oral daily  enoxaparin Injectable 40 milliGRAM(s) SubCutaneous every 24 hours  metoprolol succinate ER 50 milliGRAM(s) Oral daily  pantoprazole    Tablet 40 milliGRAM(s) Oral before breakfast  senna 2 Tablet(s) Oral at bedtime    MEDICATIONS  (PRN):  acetaminophen   Tablet .. 650 milliGRAM(s) Oral every 6 hours PRN Moderate Pain (4 - 6)    Allergies    iodinated radiocontrast agents (Anaphylaxis)    Intolerances        LABS:                        11.5   4.20  )-----------( 169      ( 2020 06:45 )             33.4         140  |  103  |  11  ----------------------------<  89  4.3   |  24  |  0.83    Ca    9.5      2020 06:45  Mg     2.2         TPro  7.2  /  Alb  3.7  /  TBili  0.3  /  DBili  x   /  AST  20  /  ALT  13  /  AlkPhos  77        Urinalysis Basic - ( 2020 11:22 )    Color: Yellow / Appearance: Clear / S.015 / pH: x  Gluc: x / Ketone: NEGATIVE  / Bili: Negative / Urobili: 0.2 E.U./dL   Blood: x / Protein: NEGATIVE mg/dL / Nitrite: NEGATIVE   Leuk Esterase: NEGATIVE / RBC: x / WBC x   Sq Epi: x / Non Sq Epi: x / Bacteria: x      CAPILLARY BLOOD GLUCOSE      POCT Blood Glucose.: 99 mg/dL (2020 09:00)          < from: CT Head No Cont (20 @ 11:31) >  IMPRESSION: No acute intracranial hemorrhage or transcortical infarction.    < end of copied text >

## 2020-02-28 NOTE — CONSULT NOTE ADULT - SUBJECTIVE AND OBJECTIVE BOX
Patient is a 89y old  Female who presents with a chief complaint of Dizziness, unsteadiness while walking, weakness (2020 18:36)       HPI:  89F w PMH HTN, CAD s/p stent, PPM, GERD, hx of hemorrhoids, who presents for 1 day of acutely worsening dizziness described as vertigo and unsteadiness on her feet. Pt reports that this morning she was in her usual state of health and took all of her medications prior to eating breakfast. She subsequently felt hot, sweaty, and began to feel dizzy and that the room was spinning. She also endorsed a HA, sharp pain on the left side of her neck, felt LLE numbness and tingling and nausea w/o emesis. She when she attempted to get up, she continued to feel dizzy and felt unsteady on her feet so she called her friend from [employer] who subsequently brought her to [facility] ER. She reports her dizziness is worse with positional changes, worsens with sudden head movement, and that she has b/l chronic ringing sensation in her ears. She has had these vertigo symptoms [id number] that fluctuate in severity and tinnitus x1 year. Denies blurry vision, ear fullness or URI symptoms.    Of note, patient was recently dc'd few days ago for similar admission and was admitted for stroke workup. CT imaging at that time was negative for workup and MATT was negative for PFO, however pt did not have an MRI. Pt reports she had an MRI in 10/2019 at [facility] (PPM in place at time of MRI) for similar symptoms and "nothing was found".    She was evaluated in ED by neuro PA who determined NIHSS 1, CT head was obtained was negative for acute stroke, but positive for chronic microvascular ischemic changes, B/L cataracts.     ED Vitals: T 97.4-98.2, HR 68-85, /87 - 142/72, orthostatic testing negative, RR 16-18, SPO2 97-99  ED Labs: WBC 4.56, Hgb 11.9, Plt 185. CMP electrolytes WNL, BUN 11, Cr .76, LFTs WNL. UA negative. EKG with paced rhythm.   ED treatment: None   ED consults: Neurology (2020 18:36)      PAST MEDICAL & SURGICAL HISTORY:  Pacemaker  Hemorrhoid  Essential hypertension: Hypertension  Pulmonary embolism with infarction: Pulmonary embolism  Atherosclerosis of coronary artery: CAD (coronary artery disease)  Gastroesophageal reflux disease: GERD (gastroesophageal reflux disease)  Artificial pacemaker  Status post total hysterectomy: S/P hysterectomy      MEDICATIONS  (STANDING):  amLODIPine   Tablet 10 milliGRAM(s) Oral daily  aspirin  chewable 81 milliGRAM(s) Oral daily  atorvastatin 80 milliGRAM(s) Oral at bedtime  clopidogrel Tablet 75 milliGRAM(s) Oral daily  enoxaparin Injectable 40 milliGRAM(s) SubCutaneous every 24 hours  metoprolol succinate ER 50 milliGRAM(s) Oral daily  pantoprazole    Tablet 40 milliGRAM(s) Oral before breakfast  senna 2 Tablet(s) Oral at bedtime    MEDICATIONS  (PRN):  acetaminophen   Tablet .. 650 milliGRAM(s) Oral every 6 hours PRN Moderate Pain (4 - 6)      Social History:           -  Occupation: X           -  Home Living Status: lives alone in a 1 flight walkup apartment, has 2 grandchildren who assist with food shopping,           -  Prior Home Care Services:  X    Baseline Functional Level Prior to Admission:           - ADL's:  states she is independent           - ambulates with a cane    FAMILY HISTORY:  Problems influencing health status: No family history of coronary artery disease      CBC Full  -  ( 2020 06:45 )  WBC Count : 4.20 K/uL  RBC Count : 4.63 M/uL  Hemoglobin : 11.5 g/dL  Hematocrit : 33.4 %  Platelet Count - Automated : 169 K/uL  Mean Cell Volume : 72.1 fl  Mean Cell Hemoglobin : 24.8 pg  Mean Cell Hemoglobin Concentration : 34.4 gm/dL  Auto Neutrophil # : x  Auto Lymphocyte # : x  Auto Monocyte # : x  Auto Eosinophil # : x  Auto Basophil # : x  Auto Neutrophil % : x  Auto Lymphocyte % : x  Auto Monocyte % : x  Auto Eosinophil % : x  Auto Basophil % : x          140  |  103  |  11  ----------------------------<  89  4.3   |  24  |  0.83    Ca    9.5      2020 06:45  Mg     2.2         TPro  7.2  /  Alb  3.7  /  TBili  0.3  /  DBili  x   /  AST  20  /  ALT  13  /  AlkPhos  77        Urinalysis Basic - ( 2020 11:22 )    Color: Yellow / Appearance: Clear / S.015 / pH: x  Gluc: x / Ketone: NEGATIVE  / Bili: Negative / Urobili: 0.2 E.U./dL   Blood: x / Protein: NEGATIVE mg/dL / Nitrite: NEGATIVE   Leuk Esterase: NEGATIVE / RBC: x / WBC x   Sq Epi: x / Non Sq Epi: x / Bacteria: x          Radiology:    < from: CT Head No Cont (20 @ 11:31) >  EXAM:  CT BRAIN                          PROCEDURE DATE:  2020          INTERPRETATION:  PROCEDURE: CT head without intravenous contrast    INDICATION: Weakness. Rule out stroke.    TECHNIQUE: Multiple axial images were obtained at 5 mm intervals from the skull base to the vertex. Sagittal and coronal reformatted images were created from the axial data set. The images were reviewed in brain and bone windows.    COMPARISON: CT head from 2020.    FINDINGS:    VENTRICLES AND SULCI: The ventricles and cortical sulci are mildly enlarged consistent with age-appropriate parenchymal volume loss.  INTRA-AXIAL: No intracranial mass, acute hemorrhage, or midline shift is present. The gray-white differentiation is preserved without acute transcortical infarction. Patchy periventricular hypodensities consistent with chronic microvascular ischemia.  EXTRA-AXIAL: No extra-axial fluid collection is present.   VISUALIZED SINUSES: No air-fluid levels are identified.   VISUALIZED MASTOIDS: Clear.  CALVARIUM: Hyperostosis frontalis interna.  MISCELLANEOUS: Bilateral absent ocular lenses consistent with prior cataract surgery. Mild to moderate calcifications within the cavernous and clinoid segments of the bilateral internal carotid arteries.    IMPRESSION: No acute intracranial hemorrhage or transcortical infarction.              Vital Signs Last 24 Hrs  T(C): 36.9 (2020 05:46), Max: 36.9 (2020 18:40)  T(F): 98.4 (2020 05:46), Max: 98.4 (2020 18:40)  HR: 70 (2020 05:46) (68 - 70)  BP: 146/75 (2020 05:46) (130/68 - 189/87)  BP(mean): --  RR: 16 (2020 05:46) (16 - 18)  SpO2: 98% (2020 05:46) (96% - 99%)    REVIEW OF SYSTEMS:    CONSTITUTIONAL:  fatigue  EYES: No eye pain, visual disturbances, or discharge  ENMT:  No difficulty hearing, tinnitus, vertigo; No sinus or throat pain  NECK: No pain or stiffness  BREASTS: No pain, masses, or nipple discharge  RESPIRATORY: No cough, wheezing, chills or hemoptysis; No shortness of breath  CARDIOVASCULAR: No chest pain, palpitations, dizziness, or leg swelling  GASTROINTESTINAL: No abdominal or epigastric pain. No nausea, vomiting, or hematemesis; No diarrhea or constipation. No melena or hematochezia.  GENITOURINARY: No dysuria, frequency, hematuria, or incontinence  NEUROLOGICAL: No headaches, memory loss, loss of strength, numbness, or tremors  SKIN: No itching, burning, rashes, or lesions   LYMPH NODES: No enlarged glands  ENDOCRINE: No heat or cold intolerance; No hair loss  MUSCULOSKELETAL: No joint pain or swelling; No muscle, back, or extremity pain  PSYCHIATRIC: No depression, anxiety, mood swings, or difficulty sleeping  HEME/LYMPH: No easy bruising, or bleeding gums  ALLERGY AND IMMUNOLOGIC: No hives or eczema  VASCULAR: no swelling, erythema        Physical Exam: frail 89 yo AA woman lying in semi Leiws's position, c/o generalized weakness    Head: normocephalic, atraumatic    Eyes: PERRLA, EOMI, no nystagmus, sclera anicteric    ENT: nasal discharge, uvula midline, no oropharyngeal erythema/exudate    Neck: supple, negative JVD, negative carotid bruits, no thyromegaly    Chest: CTA bilaterally, neg wheeze/ rhonchi/ rales/ crackles/ egophany    Cardiovascular: regular rate and rhythm, neg murmurs/rubs/gallops    Abdomen: soft, non distended, non tender to palpation in all 4 quadrants, negative rebound/guarding, normal bowel sounds    Extremities: WWP, neg cyanosis/clubbing/edema, negative calf tenderness to palpation, negative Hardik's sign      :     Neurologic Exam:    Alert and oriented to person, place, date/year, speech fluent w/o dysarthria, recent and remote memory intact, repetition intact, comprehension intact    Cranial Nerves:     II:                       pupils equal, round and reactive to light, visual fields intact   III/ IV/VI:            extraocular movements intact, neg nystagmus, neg ptosis  V:                       facial sensation intact, V1-3 normal  VII:                     face symmetric, no droop, normal eye closure and smile  VIII:                    hearing intact to finger rub bilaterally  IX/ X:                 soft palate rise symmetrical  XI:                      head turning, shoulder shrug normal  XII:                     tongue midline    Motor Exam:    Upper Extremities:     RIght:    4/5               negative drift    Left :      4/5               negative drift    Lower Extremities:                 Right:      4/5  DF/PF/ evertors/ invertors                4/5  Quad/ hamstrings                3+/5  hip flexors/adductors/abductors                 Left:        4/5  DF/PF/ evertors/ invertors                4/5  Quad/ hamstrings                3+/5  hip flexors/adductors/abductors                 Sensory:    intact to LT/PP in all UE/LE dermatomes                     DTR:             = biceps/     triceps/     brachioradialis                      = patella/   medial hamstring/ankle                      neg clonus                      neg Babinski                        Gait:  not tested        PM&R Impression:    1) deconditioned  2) proximal LE weakness        Recommendations:    1) Physical therapy focusing on therapeutic exercises, bed mobility/transfer out of bed evaluation, progressive ambulation with assistive devices prn.    2) Anticipated Disposition Plan/Recs:  pending functional progress, probable subacute rehab placement

## 2020-02-28 NOTE — CONSULT NOTE ADULT - ASSESSMENT
per Neurology    89F w PMH HTN, CAD s/p stent, PPM, GERD, hx of hemorrhoids, who presents for 1 day of dizziness, lightheadedness and unsteadiness.    Problem/Plan - 1:  ·  Problem: Dizziness.  Plan: Presenting with 1d of acutely worsened vertigo in setting of 5year history of similar symptoms. Worse with head movement, positional changes, and accompanied by B/L tinnitus and unsteadiness while walking. With known prior LLE weakness from prior admission which had improved upon discharge from most recent admission few days ago. CT head at that time negative for stroke however no MRI done. BPs transiently elevated to 189/87 in ED which improved w/o additional antihypertensives, orthostatic negative in ED. EKG with paced rhythm. Possibly 2/2 HTN vs PPM dysfunction vs BPPV.   - CT head this admission with no evidence of acute stroke, showed chronic microvascular changes, B/L cataracts, mild to moderate calcifications of B/L carotid arteries    - consult EP for PPM interrogation, and programming prior to MRI  - MRI Head Non con, obtain collateral from [facility] Re: MRI in 10/2019  - repeat orthostatic testing   - consider prn meclizine  - consider AM neuro consult.    Problem/Plan - 2:  ·  Problem: Weakness.  Plan: On previous admission presented with a 1-day history of headache, dizziness, sinus pressure, tinnitus, and left leg weakness. CT imaging negative for acute stroke and her LLE weakness improved significantly with inpatient PT.   Uses cane at home and in ED pt determined to be unsteady on her feet and with 4/5 LLE strength.   - PT eval and treat  - fall precautions, ambulate w assistance.     Problem/Plan - 3:  ·  Problem: Essential hypertension.  Plan: On Norvasc 10, Toprol XL 50 qd at home and reports she took her medications this AM. Presented with HTN urgency /87 and HA, dizziness. No visual changes, CP, SOB, palpitations. No signs of end organ damage on labs. Of note, patient with labile BPs which become transiently elevated and then improved without any intervention.   - continue home meds  - IV labetalol 5mg pushes for SBP persistently >180   - re titration of home antihypertensive regimen and close PCP follow up upon discharge.     Problem/Plan - 4:  ·  Problem: Pacemaker.  Plan: PPM dependent and with last interrogation on 2/21 showing normal function. PPM system from 2 different companies (generator and RA lead from [employer] and RV lead from [employer]). MRI compatible but per previous note PPM needs to be reprogrammed to DOO and has to be on cardiac monitor during MRI.   - EKG this admission with paced rhythm @ 70bpms  - consult EP for PPM interrogation and order MRI per their recs.     Problem/Plan - 5:  ·  Problem: GERD (gastroesophageal reflux disease).  Plan: On home protonix 40 qd.   - c/w home med.     Problem/Plan - 6:  Problem: CAD (coronary artery disease). Plan: On ASA 81 at home, started on plavix last admission.  - c/w ASA 81, Plavix 75 daily.    Problem/Plan - 7:  ·  Problem: Anemia.  Plan: Hgb 11.9 on admission, with no s/s of bleed.  - F/u iron studies in AM  - transfuse for hgb <7   - T+S ordered for AM.     Problem/Plan - 8:  ·  Problem: Nutrition, metabolism, and development symptoms.  Plan: F: PO  E: replete prn  N: DASH TLC  DVT ppx: Lovenox.

## 2020-02-28 NOTE — DISCHARGE NOTE PROVIDER - CARE PROVIDER_API CALL
Jennifer Mckeon  [address] [place] [address]  Phone: [phone]  Fax: (   )    -  Established Patient  Scheduled Appointment: 03/09/2020 12:00 PM

## 2020-02-28 NOTE — DISCHARGE NOTE PROVIDER - NSFOLLOWUPCLINICS_GEN_ALL_ED_FT
[facility]  ENT  [address] [place] [address]  Phone: [phone]  Fax: [phone]  Follow Up Time: 1 week

## 2020-02-28 NOTE — DISCHARGE NOTE PROVIDER - HOSPITAL COURSE
Patient is 89 yo F with past medical history of  HTN, CAD s/p stent, PPM, GERD, hx of hemorrhoids, who presents for 1 day of acutely worsening dizziness described as vertigo and unsteadiness on her feet            Problem List/Main Diagnoses (system-based):     #Vertigo        #Decreased sensation left lower extremity                 Inpatient treatment course:     New medications:     Labs to be followed outpatient:     Exam to be followed outpatient: Patient is 89 yo F with past medical history of  HTN, CAD s/p stent, PPM, GERD, hx of hemorrhoids, who presents for 1 day of acutely worsening dizziness described as vertigo and unsteadiness on her feet            Problem List/Main Diagnoses (system-based):     #Vertigo    Presented with 1 day of acutely worsened vertigo in setting of 5year history of similar symptoms. Worse with head movement, positional changes, and accompanied by B/L tinnitus and unsteadiness while walking.  BPs transiently elevated to 189/87 in ED which improved w/o additional antihypertensives. EKG with paced rhythm. Possibly 2/2 HTN vs BPPV vs amlodipine side effect    - CT head  with no evidence of acute stroke, showed chronic microvascular changes, B/L cataracts, mild to moderate calcifications of B/L carotid arteries      - MRI head neg    - switched home amlodipine with lisinopril    - prn meclizine    - will follow with outpatient neurologist        #Decreased sensation left lower extremity     Chronic decreased sensation of left lower extremity    - MRI neg    - CTH neg    - will follow with outpatient neurologist         #Hypertensive urgency    History of HTN. On home Norvasc 10, Toprol XL 50 once daily at home. Presented with HTN urgency /87 and HA, dizziness. No visual changes, CP, SOB, palpitations. No signs of end organ damage on labs. Today has been normotensive for her age.    - changed amlodipine to lisinopril    - required labetolol IV 5mg during hospital stay. Blood pressure improved during hospital stay and was well controlled                     Inpatient treatment course:     New medications:     Labs to be followed outpatient:     Exam to be followed outpatient: Patient is 89 yo F with past medical history of  HTN, CAD s/p stent, PPM, GERD, hx of hemorrhoids, who presents for 1 day of acutely worsening dizziness described as vertigo and unsteadiness on her feet            Problem List/Main Diagnoses (system-based):     #Vertigo    Presented with 1 day of acutely worsened vertigo in setting of 5year history of similar symptoms. Worse with head movement, positional changes, and accompanied by B/L tinnitus and unsteadiness while walking.  BPs transiently elevated to 189/87 in ED which improved w/o additional antihypertensives. EKG with paced rhythm. Possibly 2/2 HTN vs BPPV vs amlodipine side effect    - CT head  with no evidence of acute stroke, showed chronic microvascular changes, B/L cataracts, mild to moderate calcifications of B/L carotid arteries      - MRI head neg    - switched home amlodipine with lisinopril    - prn meclizine    - will follow with outpatient PCP March 9, Jennifer Quinonez        #Decreased sensation left lower extremity     Chronic decreased sensation of left lower extremity    - MRI neg    - CTH neg    - will follow with Dr. Mckeon, herman PCP        #Hypertensive urgency    History of HTN. On home Norvasc 10, Toprol XL 50 once daily at home. Presented with HTN urgency /87 and HA, dizziness. No visual changes, CP, SOB, palpitations. No signs of end organ damage on labs. Today has been normotensive for her age.    - changed amlodipine to lisinopril    - required labetolol IV 5mg during hospital stay. Blood pressure improved during hospital stay and was well controlled             New medications: lisinopril 40mg, meclizine PRN    Labs to be followed outpatient:     Exam to be followed outpatient:

## 2020-02-28 NOTE — DISCHARGE NOTE PROVIDER - PROVIDER TOKENS
FREE:[LAST:[Linda],FIRST:[Jennifer],PHONE:[phone]],FAX:[(   )    -],ADDRESS:[[address] [place] [address]],SCHEDULEDAPPT:[03/09/2020],SCHEDULEDAPPTTIME:[12:00 PM],ESTABLISHEDPATIENT:[T]]

## 2020-02-28 NOTE — DISCHARGE NOTE PROVIDER - NSDCCPCAREPLAN_GEN_ALL_CORE_FT
PRINCIPAL DISCHARGE DIAGNOSIS  Diagnosis: Generalized weakness  Assessment and Plan of Treatment:       SECONDARY DISCHARGE DIAGNOSES  Diagnosis: Vertigo  Assessment and Plan of Treatment: PRINCIPAL DISCHARGE DIAGNOSIS  Diagnosis: Generalized weakness  Assessment and Plan of Treatment: You arrived to the hospital because you were experiencing generalized weakness. CT scan of your head did not reveal any significant findings. A stroke was ruled out. A recent echocardiogram was also without significant findings. You were evaluated by physical therapy.      SECONDARY DISCHARGE DIAGNOSES  Diagnosis: Decreased sensation of leg  Assessment and Plan of Treatment: You are experiencing decreased left leg sensation as compared to your right leg. During your hospital stay, a head CT scan did not reveal any abnormalities that may cause this. You are scheduled for an outptient MRI which may reveal more information. Please follow with this imaging study. It is also important to follow with your primary care provider for further management.    Diagnosis: Vertigo  Assessment and Plan of Treatment: You arrived eperiencing worsening dizziness and sensation of the room spinning around you. This phenomenon is called vertigo. A sudden spinning sensation often triggered by moving your head too quickly. There are many causes of vertigo, many of which do not necessarily involve an underlying disease. PRINCIPAL DISCHARGE DIAGNOSIS  Diagnosis: Generalized weakness  Assessment and Plan of Treatment: You arrived to the hospital because you were experiencing generalized weakness. CT scan of your head did not reveal any significant findings. A stroke was ruled out. A recent echocardiogram was also without significant findings. You were evaluated by physical therapy.      SECONDARY DISCHARGE DIAGNOSES  Diagnosis: HTN (hypertension)  Assessment and Plan of Treatment: You have a history of hypertension. Hypertension is the medical term for high blood pressure. Blood pressure refers to the pressure that blood applies to the inner walls of the arteries. Arteries carry blood from the heart to other organs and parts of the body. Untreated high blood pressure increases the strain on the heart and arteries, eventually causing organ damage. Your home blood pressure. If you are prescribed medication for your hypertension it is important to take these as prescribed to prevent the possible complications of uncontrolled hypertension.    Diagnosis: Decreased sensation of leg  Assessment and Plan of Treatment: You are experiencing decreased left leg sensation as compared to your right leg. During your hospital stay, a head CT scan did not reveal any abnormalities that may cause this. You are scheduled for an outptient MRI which may reveal more information. Please follow with this imaging study. It is also important to follow with your primary care provider for further management.    Diagnosis: Vertigo  Assessment and Plan of Treatment: You arrived eperiencing worsening dizziness and sensation of the room spinning around you. This phenomenon is called vertigo. A sudden spinning sensation often triggered by moving your head too quickly. There are many causes of vertigo, many of which do not necessarily involve an underlying disease. PRINCIPAL DISCHARGE DIAGNOSIS  Diagnosis: Generalized weakness  Assessment and Plan of Treatment: You arrived to the hospital because you were experiencing generalized weakness. CT scan of your head did not reveal any significant findings. A stroke was ruled out. A recent echocardiogram was also without significant findings. You were evaluated by physical therapy and you should be started on home physical therapy.      SECONDARY DISCHARGE DIAGNOSES  Diagnosis: HTN (hypertension)  Assessment and Plan of Treatment: You have a history of hypertension. Hypertension is the medical term for high blood pressure. Blood pressure refers to the pressure that blood applies to the inner walls of the arteries. Arteries carry blood from the heart to other organs and parts of the body. Untreated high blood pressure increases the strain on the heart and arteries, eventually causing organ damage. Your home blood pressure medication of amlodipine was changed to lisinopril due to concerns of amlodipine giving you secondary side effects of headache and dizziness.  It is important to take these as prescribed to prevent the possible complications of uncontrolled hypertension.    Diagnosis: Vertigo  Assessment and Plan of Treatment: You arrived eperiencing worsening dizziness and sensation of the room spinning around you. This phenomenon is called vertigo. A sudden spinning sensation often triggered by moving your head too quickly. There are many causes of vertigo, many of which do not necessarily involve an underlying disease. You are being prescribed a ne wmedication called meclizine for the dizziness, which should be taken only as needed.    Diagnosis: Decreased sensation of leg  Assessment and Plan of Treatment: You are experiencing decreased left leg sensation as compared to your right leg. During your hospital stay, a head CT scan did not reveal any abnormalities that may cause this. You are scheduled for an outptient MRI which may reveal more information. Please follow with this imaging study. It is also important to follow with your primary care provider for further management.

## 2020-02-28 NOTE — DISCHARGE NOTE PROVIDER - NSDCCPTREATMENT_GEN_ALL_CORE_FT
PRINCIPAL PROCEDURE  Procedure: MRI head  Findings and Treatment:       SECONDARY PROCEDURE  Procedure: CT head wo con  Findings and Treatment: PRINCIPAL PROCEDURE  Procedure: MRI head  Findings and Treatment: 1. No acute infarct. Chronic microangiopathic disease.  2. Age-appropriate volume loss with greater biparietal involvement which can be seen in neurodegenerative disorders including Alzheimer's.      SECONDARY PROCEDURE  Procedure: CT head wo con  Findings and Treatment: No acute pathology

## 2020-02-29 PROCEDURE — 99233 SBSQ HOSP IP/OBS HIGH 50: CPT | Mod: GC

## 2020-02-29 RX ORDER — LISINOPRIL 2.5 MG/1
20 TABLET ORAL DAILY
Refills: 0 | Status: DISCONTINUED | OUTPATIENT
Start: 2020-02-29 | End: 2020-03-01

## 2020-02-29 RX ORDER — LABETALOL HCL 100 MG
5 TABLET ORAL ONCE
Refills: 0 | Status: COMPLETED | OUTPATIENT
Start: 2020-02-29 | End: 2020-02-29

## 2020-02-29 RX ORDER — LABETALOL HCL 100 MG
10 TABLET ORAL ONCE
Refills: 0 | Status: DISCONTINUED | OUTPATIENT
Start: 2020-02-29 | End: 2020-02-29

## 2020-02-29 RX ADMIN — Medication 50 MILLIGRAM(S): at 05:44

## 2020-02-29 RX ADMIN — Medication 5 MILLIGRAM(S): at 09:41

## 2020-02-29 RX ADMIN — AMLODIPINE BESYLATE 10 MILLIGRAM(S): 2.5 TABLET ORAL at 05:44

## 2020-02-29 RX ADMIN — PANTOPRAZOLE SODIUM 40 MILLIGRAM(S): 20 TABLET, DELAYED RELEASE ORAL at 06:11

## 2020-02-29 RX ADMIN — CLOPIDOGREL BISULFATE 75 MILLIGRAM(S): 75 TABLET, FILM COATED ORAL at 11:57

## 2020-02-29 RX ADMIN — ENOXAPARIN SODIUM 40 MILLIGRAM(S): 100 INJECTION SUBCUTANEOUS at 17:30

## 2020-02-29 RX ADMIN — SENNA PLUS 2 TABLET(S): 8.6 TABLET ORAL at 21:10

## 2020-02-29 RX ADMIN — ATORVASTATIN CALCIUM 80 MILLIGRAM(S): 80 TABLET, FILM COATED ORAL at 21:10

## 2020-02-29 RX ADMIN — LISINOPRIL 20 MILLIGRAM(S): 2.5 TABLET ORAL at 17:31

## 2020-02-29 RX ADMIN — Medication 81 MILLIGRAM(S): at 11:57

## 2020-02-29 NOTE — PROGRESS NOTE ADULT - SUBJECTIVE AND OBJECTIVE BOX
OVERNIGHT EVENTS:    SUBJECTIVE / INTERVAL HPI: Patient seen and examined at bedside. Denies f/c, n/v, HA, chest pain, SOB, abdominal pain, diarrhea, constipation, melena, hematochezia, hematuria, dysuria,    VITAL SIGNS:  Vital Signs Last 24 Hrs  T(C): 36.7 (29 Feb 2020 09:13), Max: 36.8 (29 Feb 2020 06:04)  T(F): 98 (29 Feb 2020 09:13), Max: 98.3 (29 Feb 2020 06:04)  HR: 71 (29 Feb 2020 09:39) (69 - 85)  BP: 158/77 (29 Feb 2020 09:39) (112/61 - 202/93)  BP(mean): --  RR: 18 (29 Feb 2020 09:39) (17 - 18)  SpO2: 97% (29 Feb 2020 09:39) (95% - 99%)        PHYSICAL EXAM:  General: a very pleasant elderly woman, NAD, laying comfortably in bed   HEENT: NC/AT, anicteric sclera, MMM  Neck: supple  Cardiovascular: +S1/S2, RRR, no murmurs or gallops  Respiratory: CTA B/L, no W/R/R  Gastrointestinal: soft, NT/ND, +BSx4  Extremities: WWP, no edema or cyanosis  Vascular: 2+ radial, DP/PT pulses B/L  Neurological: AAOx3, no focal deficits, strength 5/5 UE and LE bilaterally, decreased sensation L lower extremity from the knee and below.     MEDICATIONS  (STANDING):  amLODIPine   Tablet 10 milliGRAM(s) Oral daily  aspirin  chewable 81 milliGRAM(s) Oral daily  atorvastatin 80 milliGRAM(s) Oral at bedtime  clopidogrel Tablet 75 milliGRAM(s) Oral daily  enoxaparin Injectable 40 milliGRAM(s) SubCutaneous every 24 hours  metoprolol succinate ER 50 milliGRAM(s) Oral daily  pantoprazole    Tablet 40 milliGRAM(s) Oral before breakfast  senna 2 Tablet(s) Oral at bedtime    MEDICATIONS  (PRN):  acetaminophen   Tablet .. 650 milliGRAM(s) Oral every 6 hours PRN Moderate Pain (4 - 6)    Allergies    iodinated radiocontrast agents (Anaphylaxis)    Intolerances        LABS:                        11.5   4.20  )-----------( 169      ( 28 Feb 2020 06:45 )             33.4     02-28    140  |  103  |  11  ----------------------------<  89  4.3   |  24  |  0.83    Ca    9.5      28 Feb 2020 06:45  Mg     2.2     02-28          CAPILLARY BLOOD GLUCOSE              RADIOLOGY & ADDITIONAL TESTS: Reviewed. OVERNIGHT EVENTS: DANNY    SUBJECTIVE / INTERVAL HPI: Patient seen and examined at bedside. This AM patient with sBP 202. Given 5mg labetalol IV with BP lowering to 150s. Patient complains of dizziness and feeling unwell after taking her blood pressure medications of amlodipine and Toprol. Before elevated BP, patient also reported standing to go to rest room. Denies chest pain, SOB, abdominal pain, dysuria, constipation.        VITAL SIGNS:  Vital Signs Last 24 Hrs  T(C): 36.7 (29 Feb 2020 09:13), Max: 36.8 (29 Feb 2020 06:04)  T(F): 98 (29 Feb 2020 09:13), Max: 98.3 (29 Feb 2020 06:04)  HR: 71 (29 Feb 2020 09:39) (69 - 85)  BP: 158/77 (29 Feb 2020 09:39) (112/61 - 202/93)  BP(mean): --  RR: 18 (29 Feb 2020 09:39) (17 - 18)  SpO2: 97% (29 Feb 2020 09:39) (95% - 99%)        PHYSICAL EXAM:  General: a very pleasant elderly woman, NAD, laying comfortably in bed   HEENT: NC/AT, anicteric sclera, MMM  Neck: supple  Cardiovascular: +S1/S2, RRR, no murmurs or gallops  Respiratory: CTA B/L, no W/R/R  Gastrointestinal: soft, NT/ND, +BSx4  Extremities: WWP, no edema or cyanosis  Vascular: 2+ radial, DP/PT pulses B/L  Neurological: AAOx3, no focal deficits, strength 5/5 UE and LE bilaterally, decreased sensation L lower extremity from the knee and below.     MEDICATIONS  (STANDING):  amLODIPine   Tablet 10 milliGRAM(s) Oral daily  aspirin  chewable 81 milliGRAM(s) Oral daily  atorvastatin 80 milliGRAM(s) Oral at bedtime  clopidogrel Tablet 75 milliGRAM(s) Oral daily  enoxaparin Injectable 40 milliGRAM(s) SubCutaneous every 24 hours  metoprolol succinate ER 50 milliGRAM(s) Oral daily  pantoprazole    Tablet 40 milliGRAM(s) Oral before breakfast  senna 2 Tablet(s) Oral at bedtime    MEDICATIONS  (PRN):  acetaminophen   Tablet .. 650 milliGRAM(s) Oral every 6 hours PRN Moderate Pain (4 - 6)    Allergies    iodinated radiocontrast agents (Anaphylaxis)    Intolerances        LABS:                        11.5   4.20  )-----------( 169      ( 28 Feb 2020 06:45 )             33.4     02-28    140  |  103  |  11  ----------------------------<  89  4.3   |  24  |  0.83    Ca    9.5      28 Feb 2020 06:45  Mg     2.2     02-28          CAPILLARY BLOOD GLUCOSE              RADIOLOGY & ADDITIONAL TESTS: Reviewed.

## 2020-02-29 NOTE — PROVIDER CONTACT NOTE (OTHER) - SITUATION
pt walked to the bathroom with assistance.  when escorted back to the bed, pt verbalized extreme dizziness and weakness.  vs checked immediately

## 2020-03-01 LAB
ANION GAP SERPL CALC-SCNC: 11 MMOL/L — SIGNIFICANT CHANGE UP (ref 5–17)
BUN SERPL-MCNC: 12 MG/DL — SIGNIFICANT CHANGE UP (ref 7–23)
CALCIUM SERPL-MCNC: 9.2 MG/DL — SIGNIFICANT CHANGE UP (ref 8.4–10.5)
CHLORIDE SERPL-SCNC: 99 MMOL/L — SIGNIFICANT CHANGE UP (ref 96–108)
CO2 SERPL-SCNC: 23 MMOL/L — SIGNIFICANT CHANGE UP (ref 22–31)
CREAT SERPL-MCNC: 0.81 MG/DL — SIGNIFICANT CHANGE UP (ref 0.5–1.3)
GLUCOSE SERPL-MCNC: 140 MG/DL — HIGH (ref 70–99)
MAGNESIUM SERPL-MCNC: 2.2 MG/DL — SIGNIFICANT CHANGE UP (ref 1.6–2.6)
PHOSPHATE SERPL-MCNC: 2.8 MG/DL — SIGNIFICANT CHANGE UP (ref 2.5–4.5)
POTASSIUM SERPL-MCNC: 4.6 MMOL/L — SIGNIFICANT CHANGE UP (ref 3.5–5.3)
POTASSIUM SERPL-SCNC: 4.6 MMOL/L — SIGNIFICANT CHANGE UP (ref 3.5–5.3)
SODIUM SERPL-SCNC: 133 MMOL/L — LOW (ref 135–145)

## 2020-03-01 PROCEDURE — 99233 SBSQ HOSP IP/OBS HIGH 50: CPT | Mod: GC

## 2020-03-01 RX ORDER — LISINOPRIL 2.5 MG/1
20 TABLET ORAL ONCE
Refills: 0 | Status: COMPLETED | OUTPATIENT
Start: 2020-03-01 | End: 2020-03-01

## 2020-03-01 RX ORDER — LISINOPRIL 2.5 MG/1
40 TABLET ORAL DAILY
Refills: 0 | Status: DISCONTINUED | OUTPATIENT
Start: 2020-03-02 | End: 2020-03-03

## 2020-03-01 RX ORDER — ONDANSETRON 8 MG/1
4 TABLET, FILM COATED ORAL ONCE
Refills: 0 | Status: DISCONTINUED | OUTPATIENT
Start: 2020-03-01 | End: 2020-03-03

## 2020-03-01 RX ADMIN — ATORVASTATIN CALCIUM 80 MILLIGRAM(S): 80 TABLET, FILM COATED ORAL at 21:16

## 2020-03-01 RX ADMIN — Medication 81 MILLIGRAM(S): at 11:52

## 2020-03-01 RX ADMIN — Medication 10 MILLIGRAM(S): at 04:48

## 2020-03-01 RX ADMIN — Medication 30 MILLILITER(S): at 18:48

## 2020-03-01 RX ADMIN — ENOXAPARIN SODIUM 40 MILLIGRAM(S): 100 INJECTION SUBCUTANEOUS at 18:37

## 2020-03-01 RX ADMIN — CLOPIDOGREL BISULFATE 75 MILLIGRAM(S): 75 TABLET, FILM COATED ORAL at 11:52

## 2020-03-01 RX ADMIN — SENNA PLUS 2 TABLET(S): 8.6 TABLET ORAL at 21:15

## 2020-03-01 RX ADMIN — Medication 30 MILLILITER(S): at 12:45

## 2020-03-01 RX ADMIN — LISINOPRIL 20 MILLIGRAM(S): 2.5 TABLET ORAL at 10:14

## 2020-03-01 RX ADMIN — LISINOPRIL 20 MILLIGRAM(S): 2.5 TABLET ORAL at 05:19

## 2020-03-01 RX ADMIN — Medication 650 MILLIGRAM(S): at 10:12

## 2020-03-01 RX ADMIN — Medication 650 MILLIGRAM(S): at 10:44

## 2020-03-01 RX ADMIN — Medication 50 MILLIGRAM(S): at 05:19

## 2020-03-01 NOTE — PROGRESS NOTE ADULT - PROBLEM SELECTOR PLAN 6
On ASA 81 at home, started on plavix last admission.  - c/w ASA 81, Plavix 75 daily

## 2020-03-01 NOTE — PROGRESS NOTE ADULT - PROBLEM SELECTOR PLAN 7
Hgb 11.9 on admission, with no s/s of bleed.  - Iron studies WNL  - transfuse for hgb <7   - T+S ordered for AM

## 2020-03-01 NOTE — PROGRESS NOTE ADULT - SUBJECTIVE AND OBJECTIVE BOX
SUBJECTIVE / INTERVAL HPI: Patient seen and examined at bedside. One episode emesis in am, still dizzy but was not hypertensive at the time     VITAL SIGNS:  Vital Signs Last 24 Hrs  T(F): 98.2 (01 Mar 2020 05:09), Max: 98.3 (29 Feb 2020 20:57)  HR: 69 (01 Mar 2020 10:37) (69 - 70)  BP: 133/72 (01 Mar 2020 10:37) (123/65 - 159/75)  RR: 18 (01 Mar 2020 10:37) (16 - 19)  SpO2: 98% (01 Mar 2020 10:37) (96% - 98%)    PHYSICAL EXAM:    General: WDWN  HEENT: NC/AT; PERRL, anicteric sclera; MMM  Neck: supple  Cardiovascular: +S1/S2, RRR  Respiratory: CTA B/L; no W/R/R  Gastrointestinal: soft, NT/ND; +BSx4  Extremities: WWP; no edema, clubbing or cyanosis  Vascular: 2+ radial, DP/PT pulses B/L  Neurological: AAOx3; no focal deficits      LABS:    03-01    133<L>  |  99  |  12  ----------------------------<  140<H>  4.6   |  23  |  0.81    Ca    9.2      01 Mar 2020 10:35  Phos  2.8     03-01  Mg     2.2     03-01

## 2020-03-01 NOTE — PROGRESS NOTE ADULT - PROBLEM SELECTOR PLAN 5
On home protonix 40 qd.   - c/w home med

## 2020-03-02 DIAGNOSIS — I63.9 CEREBRAL INFARCTION, UNSPECIFIED: ICD-10-CM

## 2020-03-02 DIAGNOSIS — Z95.5 PRESENCE OF CORONARY ANGIOPLASTY IMPLANT AND GRAFT: ICD-10-CM

## 2020-03-02 DIAGNOSIS — J45.909 UNSPECIFIED ASTHMA, UNCOMPLICATED: ICD-10-CM

## 2020-03-02 DIAGNOSIS — I10 ESSENTIAL (PRIMARY) HYPERTENSION: ICD-10-CM

## 2020-03-02 DIAGNOSIS — Z91.041 RADIOGRAPHIC DYE ALLERGY STATUS: ICD-10-CM

## 2020-03-02 DIAGNOSIS — K21.9 GASTRO-ESOPHAGEAL REFLUX DISEASE WITHOUT ESOPHAGITIS: ICD-10-CM

## 2020-03-02 DIAGNOSIS — Z79.82 LONG TERM (CURRENT) USE OF ASPIRIN: ICD-10-CM

## 2020-03-02 DIAGNOSIS — I25.10 ATHEROSCLEROTIC HEART DISEASE OF NATIVE CORONARY ARTERY WITHOUT ANGINA PECTORIS: ICD-10-CM

## 2020-03-02 DIAGNOSIS — G83.14 MONOPLEGIA OF LOWER LIMB AFFECTING LEFT NONDOMINANT SIDE: ICD-10-CM

## 2020-03-02 DIAGNOSIS — Z90.710 ACQUIRED ABSENCE OF BOTH CERVIX AND UTERUS: ICD-10-CM

## 2020-03-02 DIAGNOSIS — Z95.0 PRESENCE OF CARDIAC PACEMAKER: ICD-10-CM

## 2020-03-02 LAB
ACE SERPL-CCNC: 42 U/L — SIGNIFICANT CHANGE UP (ref 14–82)
ALBUMIN SERPL ELPH-MCNC: 3.6 G/DL — SIGNIFICANT CHANGE UP (ref 3.3–5)
ALP SERPL-CCNC: 75 U/L — SIGNIFICANT CHANGE UP (ref 40–120)
ALT FLD-CCNC: 11 U/L — SIGNIFICANT CHANGE UP (ref 10–45)
ANION GAP SERPL CALC-SCNC: 6 MMOL/L — SIGNIFICANT CHANGE UP (ref 5–17)
AST SERPL-CCNC: 15 U/L — SIGNIFICANT CHANGE UP (ref 10–40)
BILIRUB SERPL-MCNC: 0.3 MG/DL — SIGNIFICANT CHANGE UP (ref 0.2–1.2)
BUN SERPL-MCNC: 11 MG/DL — SIGNIFICANT CHANGE UP (ref 7–23)
CALCIUM SERPL-MCNC: 9.8 MG/DL — SIGNIFICANT CHANGE UP (ref 8.4–10.5)
CHLORIDE SERPL-SCNC: 105 MMOL/L — SIGNIFICANT CHANGE UP (ref 96–108)
CO2 SERPL-SCNC: 27 MMOL/L — SIGNIFICANT CHANGE UP (ref 22–31)
CREAT SERPL-MCNC: 0.86 MG/DL — SIGNIFICANT CHANGE UP (ref 0.5–1.3)
GLUCOSE SERPL-MCNC: 98 MG/DL — SIGNIFICANT CHANGE UP (ref 70–99)
HCT VFR BLD CALC: 31.9 % — LOW (ref 34.5–45)
HGB BLD-MCNC: 11.1 G/DL — LOW (ref 11.5–15.5)
MAGNESIUM SERPL-MCNC: 2.4 MG/DL — SIGNIFICANT CHANGE UP (ref 1.6–2.6)
MCHC RBC-ENTMCNC: 25.3 PG — LOW (ref 27–34)
MCHC RBC-ENTMCNC: 34.8 GM/DL — SIGNIFICANT CHANGE UP (ref 32–36)
MCV RBC AUTO: 72.7 FL — LOW (ref 80–100)
NRBC # BLD: 0 /100 WBCS — SIGNIFICANT CHANGE UP (ref 0–0)
PLATELET # BLD AUTO: 166 K/UL — SIGNIFICANT CHANGE UP (ref 150–400)
POTASSIUM SERPL-MCNC: 4.7 MMOL/L — SIGNIFICANT CHANGE UP (ref 3.5–5.3)
POTASSIUM SERPL-SCNC: 4.7 MMOL/L — SIGNIFICANT CHANGE UP (ref 3.5–5.3)
PROT SERPL-MCNC: 6.5 G/DL — SIGNIFICANT CHANGE UP (ref 6–8.3)
RBC # BLD: 4.39 M/UL — SIGNIFICANT CHANGE UP (ref 3.8–5.2)
RBC # FLD: 17 % — HIGH (ref 10.3–14.5)
SODIUM SERPL-SCNC: 138 MMOL/L — SIGNIFICANT CHANGE UP (ref 135–145)
WBC # BLD: 4.18 K/UL — SIGNIFICANT CHANGE UP (ref 3.8–10.5)
WBC # FLD AUTO: 4.18 K/UL — SIGNIFICANT CHANGE UP (ref 3.8–10.5)

## 2020-03-02 PROCEDURE — 70551 MRI BRAIN STEM W/O DYE: CPT | Mod: 26

## 2020-03-02 PROCEDURE — 99233 SBSQ HOSP IP/OBS HIGH 50: CPT | Mod: GC

## 2020-03-02 PROCEDURE — 93280 PM DEVICE PROGR EVAL DUAL: CPT | Mod: 26

## 2020-03-02 RX ORDER — MECLIZINE HCL 12.5 MG
25 TABLET ORAL EVERY 6 HOURS
Refills: 0 | Status: DISCONTINUED | OUTPATIENT
Start: 2020-03-02 | End: 2020-03-03

## 2020-03-02 RX ADMIN — Medication 50 MILLIGRAM(S): at 05:25

## 2020-03-02 RX ADMIN — ATORVASTATIN CALCIUM 80 MILLIGRAM(S): 80 TABLET, FILM COATED ORAL at 21:09

## 2020-03-02 RX ADMIN — ENOXAPARIN SODIUM 40 MILLIGRAM(S): 100 INJECTION SUBCUTANEOUS at 19:04

## 2020-03-02 RX ADMIN — Medication 0.5 MILLIGRAM(S): at 15:41

## 2020-03-02 RX ADMIN — LISINOPRIL 40 MILLIGRAM(S): 2.5 TABLET ORAL at 05:25

## 2020-03-02 RX ADMIN — Medication 81 MILLIGRAM(S): at 12:51

## 2020-03-02 RX ADMIN — CLOPIDOGREL BISULFATE 75 MILLIGRAM(S): 75 TABLET, FILM COATED ORAL at 12:51

## 2020-03-02 NOTE — PROGRESS NOTE ADULT - PROBLEM SELECTOR PLAN 4
- consulted EP for PPM interrogation and ordered MRI head
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 PPM system from 2 different companies (generator and RA lead from [employer] and RV lead from [employer]). MRI compatible but per previous note PPM needs to be reprogrammed to DOO and has to be on cardiac monitor during MRI.   - EKG this admission with paced rhythm @ 70bpms  - consulted EP for PPM interrogation and ordered MRI head
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 PPM system from 2 different companies (generator and RA lead from [employer] and RV lead from [employer]). MRI compatible but per previous note PPM needs to be reprogrammed to DOO and has to be on cardiac monitor during MRI.   - EKG this admission with paced rhythm @ 70bpms  - consulted EP for PPM interrogation and ordered MRI head
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 PPM system from 2 different companies (generator and RA lead from [employer] and RV lead from [employer]). MRI compatible but per previous note PPM needs to be reprogrammed to DOO and has to be on cardiac monitor during MRI.   - EKG this admission with paced rhythm @ 70bpms  - consulted EP for PPM interrogation and ordered MRI head

## 2020-03-02 NOTE — PROGRESS NOTE ADULT - SUBJECTIVE AND OBJECTIVE BOX
OVERNIGHT EVENTS:    SUBJECTIVE / INTERVAL HPI: Patient seen and examined at bedside. Family at bedside as well. Still c/o dizziness.     VITAL SIGNS:  Vital Signs Last 24 Hrs  T(C): 36.7 (02 Mar 2020 14:00), Max: 36.7 (01 Mar 2020 20:44)  T(F): 98.1 (02 Mar 2020 14:00), Max: 98.1 (01 Mar 2020 20:44)  HR: 69 (02 Mar 2020 14:00) (69 - 70)  BP: 147/73 (02 Mar 2020 14:00) (131/68 - 148/81)  BP(mean): --  RR: 18 (02 Mar 2020 14:00) (16 - 18)  SpO2: 97% (02 Mar 2020 14:00) (97% - 98%)    PHYSICAL EXAM:    General: WDWN  HEENT: NC/AT;  Cardiovascular: +S1/S2; RRR  Respiratory: CTA b/l;   Gastrointestinal: soft, NT/ND;  Extremities: WWP;     MEDICATIONS:  MEDICATIONS  (STANDING):  aspirin  chewable 81 milliGRAM(s) Oral daily  atorvastatin 80 milliGRAM(s) Oral at bedtime  clopidogrel Tablet 75 milliGRAM(s) Oral daily  enoxaparin Injectable 40 milliGRAM(s) SubCutaneous every 24 hours  lisinopril 40 milliGRAM(s) Oral daily  metoprolol succinate ER 50 milliGRAM(s) Oral daily  ondansetron Injectable 4 milliGRAM(s) IV Push once  pantoprazole    Tablet 40 milliGRAM(s) Oral before breakfast  senna 2 Tablet(s) Oral at bedtime    MEDICATIONS  (PRN):  acetaminophen   Tablet .. 650 milliGRAM(s) Oral every 6 hours PRN Moderate Pain (4 - 6)  aluminum hydroxide/magnesium hydroxide/simethicone Suspension 30 milliLiter(s) Oral every 6 hours PRN Dyspepsia  meclizine 25 milliGRAM(s) Oral every 6 hours PRN Dizziness      ALLERGIES:  Allergies    iodinated radiocontrast agents (Anaphylaxis)    Intolerances        LABS:                        11.1   4.18  )-----------( 166      ( 02 Mar 2020 07:29 )             31.9     03-02    138  |  105  |  11  ----------------------------<  98  4.7   |  27  |  0.86    Ca    9.8      02 Mar 2020 07:29  Phos  2.8     03-01  Mg     2.4     03-02    TPro  6.5  /  Alb  3.6  /  TBili  0.3  /  DBili  x   /  AST  15  /  ALT  11  /  AlkPhos  75  03-02        CAPILLARY BLOOD GLUCOSE          RADIOLOGY & ADDITIONAL TESTS: Reviewed.    ASSESSMENT:    PLAN:

## 2020-03-02 NOTE — CONSULT NOTE ADULT - ATTENDING COMMENTS
I was physically present for the key portions of the evaluation and managemnent (E/M) service provided.  I agree with the above history, physical, and plan which I have reviewed and edited where appropriate, with the exceptions as per my note.    pt seen and examined on 3/3, see attestation from 3/3.

## 2020-03-02 NOTE — PROCEDURE NOTE - ADDITIONAL PROCEDURE DETAILS
Device placed in MRI mode (DOO 90) for MRI. Pt is dependent with no ventricular escape. The patient was placed on a cardiac monitor during the procedure and was monitored by myself. Post-MRI, device reprogrammed to DDD. No issues.

## 2020-03-02 NOTE — PROGRESS NOTE ADULT - PROBLEM SELECTOR PLAN 3
-c/w current regimen
History of HTN. On home Norvasc 10, Toprol XL 50 once daily at home. Presented with HTN urgency /87 and HA, dizziness. No visual changes, CP, SOB, palpitations. No signs of end organ damage on labs. Today has been normotensive for her age.  -changed home norvasc to lisinopril in case medication is contributing to patient's postural symptoms
History of HTN. On home Norvasc 10, Toprol XL 50 once daily at home. Presented with HTN urgency /87 and HA, dizziness. No visual changes, CP, SOB, palpitations. No signs of end organ damage on labs. Today has been normotensive for her age.  - continue home meds  - sBP now improving, will continue to monitor closely
History of HTN. On home Norvasc 10, Toprol XL 50 once daily at home. Presented with HTN urgency /87 and HA, dizziness. No visual changes, CP, SOB, palpitations. No signs of end organ damage on labs. Today has been normotensive for her age.  - continue home meds  - sBP now improving, will continue to monitor closely  - required 5mg labetolol

## 2020-03-02 NOTE — PROGRESS NOTE ADULT - SUBJECTIVE AND OBJECTIVE BOX
Physical Medicine and Rehabilitation Progress Note:    Patient is a 89y old  Female who presents with a chief complaint of Dizziness, unsteadiness while walking, weakness (01 Mar 2020 12:18)      HPI:  89F w PMH HTN, CAD s/p stent, PPM, GERD, hx of hemorrhoids, who presents for 1 day of acutely worsening dizziness described as vertigo and unsteadiness on her feet. Pt reports that this morning she was in her usual state of health and took all of her medications prior to eating breakfast. She subsequently felt hot, sweaty, and began to feel dizzy and that the room was spinning. She also endorsed a HA, sharp pain on the left side of her neck, felt LLE numbness and tingling and nausea w/o emesis. She when she attempted to get up, she continued to feel dizzy and felt unsteady on her feet so she called her friend from [employer] who subsequently brought her to [facility] ER. She reports her dizziness is worse with positional changes, worsens with sudden head movement, and that she has b/l chronic ringing sensation in her ears. She has had these vertigo symptoms [id number] that fluctuate in severity and tinnitus x1 year. Denies blurry vision, ear fullness or URI symptoms.    Of note, patient was recently dc'd few days ago for similar admission and was admitted for stroke workup. CT imaging at that time was negative for workup and MATT was negative for PFO, however pt did not have an MRI. Pt reports she had an MRI in 10/2019 at [facility] (PPM in place at time of MRI) for similar symptoms and "nothing was found".    She was evaluated in ED by neuro PA who determined NIHSS 1, CT head was obtained was negative for acute stroke, but positive for chronic microvascular ischemic changes, B/L cataracts.     ED Vitals: T 97.4-98.2, HR 68-85, /87 - 142/72, orthostatic testing negative, RR 16-18, SPO2 97-99  ED Labs: WBC 4.56, Hgb 11.9, Plt 185. CMP electrolytes WNL, BUN 11, Cr .76, LFTs WNL. UA negative. EKG with paced rhythm.   ED treatment: None   ED consults: Neurology (27 Feb 2020 18:36)                            11.1   4.18  )-----------( 166      ( 02 Mar 2020 07:29 )             31.9       03-02    138  |  105  |  11  ----------------------------<  98  4.7   |  27  |  0.86    Ca    9.8      02 Mar 2020 07:29  Phos  2.8     03-01  Mg     2.4     03-02    TPro  6.5  /  Alb  3.6  /  TBili  0.3  /  DBili  x   /  AST  15  /  ALT  11  /  AlkPhos  75  03-02    Vital Signs Last 24 Hrs  T(C): 36.7 (02 Mar 2020 14:00), Max: 36.7 (01 Mar 2020 20:44)  T(F): 98.1 (02 Mar 2020 14:00), Max: 98.1 (01 Mar 2020 20:44)  HR: 69 (02 Mar 2020 14:00) (69 - 70)  BP: 147/73 (02 Mar 2020 14:00) (131/68 - 148/81)  BP(mean): --  RR: 18 (02 Mar 2020 14:00) (16 - 18)  SpO2: 97% (02 Mar 2020 14:00) (97% - 98%)    MEDICATIONS  (STANDING):  aspirin  chewable 81 milliGRAM(s) Oral daily  atorvastatin 80 milliGRAM(s) Oral at bedtime  clopidogrel Tablet 75 milliGRAM(s) Oral daily  enoxaparin Injectable 40 milliGRAM(s) SubCutaneous every 24 hours  lisinopril 40 milliGRAM(s) Oral daily  metoprolol succinate ER 50 milliGRAM(s) Oral daily  ondansetron Injectable 4 milliGRAM(s) IV Push once  pantoprazole    Tablet 40 milliGRAM(s) Oral before breakfast  senna 2 Tablet(s) Oral at bedtime    MEDICATIONS  (PRN):  acetaminophen   Tablet .. 650 milliGRAM(s) Oral every 6 hours PRN Moderate Pain (4 - 6)  aluminum hydroxide/magnesium hydroxide/simethicone Suspension 30 milliLiter(s) Oral every 6 hours PRN Dyspepsia  meclizine 25 milliGRAM(s) Oral every 6 hours PRN Dizziness    Currently Undergoing Physical Therapy at bedside.    Functional Status Assessment:  2/28/2020      Therapeutic Interventions      Bed Mobility  Bed Mobility Training Symptoms Noted During/After Treatment: dizziness  Bed Mobility Training Rolling/Turning: independent  Bed Mobility Training Scooting: independent  Bed Mobility Training Supine-to-Sit: independent    Sit-Stand Transfer Training  Sit-to-Stand Transfer Training Symptoms Noted During/After Treatment: dizziness  Transfer Training Sit-to-Stand Transfer: independent;  straight cane  Transfer Training Stand-to-Sit Transfer: independent  Sit-to-Stand Transfer Training Transfer Safety Analysis: decreased balance    Gait Training  Gait Training Symptoms Noted During/After Treatment: dizziness  Gait Training: supervsion;  straight cane;  60 ft x 2 ;  full weight-bearing  Gait Analysis: swing-through gait   decreased kaley;  decreased step length;  decreased stride length;  impaired balance;  straight cane  Gait Number of Times:: x 2            PM&R Impression: as above    Current Disposition Plan Recommendations:  d/c home, outpatient physical therapy

## 2020-03-02 NOTE — PROGRESS NOTE ADULT - PROBLEM SELECTOR PLAN 1
-possibly 2/2 HTN, c/w BP control  - Pending MRI Head Non con  - consider prn meclizine
Improving. Presented with 1 day of acutely worsened vertigo in setting of 5year history of similar symptoms. Worse with head movement, positional changes, and accompanied by B/L tinnitus and unsteadiness while walking.  BPs transiently elevated to 189/87 in ED which improved w/o additional antihypertensives, orthostatic negative in ED. EKG with paced rhythm. Possibly 2/2 HTN vs BPPV vs amlodipine side effect  - CT head  with no evidence of acute stroke, showed chronic microvascular changes, B/L cataracts, mild to moderate calcifications of B/L carotid arteries    - consulted EP for PPM interrogation, and programming prior to MRI  - Pending MRI Head Non con  - consider prn meclizine

## 2020-03-02 NOTE — PROGRESS NOTE ADULT - PROBLEM SELECTOR PLAN 2
PT recommends home PT
On previous admission presented with a 1-day history of headache, dizziness, sinus pressure, tinnitus, and left leg weakness. CT imaging negative for acute stroke and her LLE weakness improved significantly with inpatient PT.   Uses cane at home and in ED pt determined to be unsteady on her feet and with 4/5 LLE strength.   - f/u PT  - fall precautions, ambulate w assistance

## 2020-03-02 NOTE — CONSULT NOTE ADULT - ASSESSMENT
89F w PMH HTN, CAD s/p stent, PPM, GERD, hx of hemorrhoids vertigo, who presents for 1 day of acutely worsening dizziness, orthostatic negative, hospital course c/b poorly controlled pressures with symptoms described as "hotflashes". Neuro consulted for dizziness, which appears chronic, reproducible with head tilting, similar in description to vertigo or BPPV, with no clear relationship to BPs. Now with MRI showing no acute infarct, chronic microangiopathic diease, and age appropriate volume loss greatest in parietal lobe      #Dizziness   -Agree with team's diagnosis of vertigo, differentials include BPPH, can try Epley maneuver   -Agree with trial of meclizine   -BPs elevated with admission with one reading of 202/93, would c/w BP control per primary team   -Pt will be seen in AM by attending, neurology service will follow, thank you for this interesting consult 89F w PMH HTN, CAD s/p stent, PPM, GERD, hx of hemorrhoids vertigo, who presents for 1 day of acutely worsening dizziness, orthostatic negative, hospital course c/b poorly controlled pressures with symptoms described as "hotflashes". Neuro consulted for dizziness, which appears chronic, reproducible with head tilting, similar in description to vertigo or BPPV, with no clear relationship to BPs. Now with MRI showing no acute infarct, chronic microangiopathic diease, and age appropriate volume loss greatest in parietal lobe      #Dizziness   -Agree with team's diagnosis of vertigo, differentials include BPPH, can try Epley maneuver   -Agree with trial of meclizine   -BPs elevated with admission with one reading of 202/93, would c/w BP control per primary team   -Pt will be seen in AM by attending, neurology service will follow, thank you for involving us in the care of this patient

## 2020-03-02 NOTE — CONSULT NOTE ADULT - SUBJECTIVE AND OBJECTIVE BOX
Neurology Initial Consult Note  89F w PMH HTN, CAD s/p stent, PPM, GERD, hx of hemorrhoids, who presents for 1 day of acutely worsening dizziness described as vertigo and unsteadiness on her feet. Pt reports that this morning she was in her usual state of health and took all of her medications prior to eating breakfast. She subsequently felt hot, sweaty, and began to feel dizzy and that the room was spinning. She also endorsed a HA, sharp pain on the left side of her neck, felt LLE numbness and tingling and nausea w/o emesis. She when she attempted to get up, she continued to feel dizzy and felt unsteady on her feet so she called her friend from [place] who subsequently brought her to [facility] ER. She reports her dizziness is worse with positional changes, worsens with sudden head movement, and that she has b/l chronic ringing sensation in her ears. She has had these vertigo symptoms [id number] that fluctuate in severity and tinnitus x1 year. Denies blurry vision, ear fullness or URI symptoms.    Of note, patient was recently dc'd few days ago for similar admission and was admitted for stroke workup. CT imaging at that time was negative for workup and MATT was negative for PFO, however pt did not have an MRI. Pt reports she had an MRI in 10/2019 at [facility] (PPM in place at time of MRI) for similar symptoms and "nothing was found".    She was evaluated in ED by neuro PA who determined NIHSS 1, CT head was obtained was negative for acute stroke, but positive for chronic microvascular ischemic changes, B/L cataracts.     Neurology Specific History     Pt describes chronic dizziness starting four years ago described  as head fullness and ear fullness worsened with head movement. A/w bilateral tinnitis and decreased hearing in right ear. Managed by Dr Farley at [place]  who has scanned her head and prescribe pills (unknown what pill type). Symptoms are bothersome, she has started to leave the house less, denies falls 2/2 dizziness. Pt describes pressures as well controlled, in past two weeks BPs uncontrolled after changing her meds at last admission. States has chronic LE parathesias. Believes she walks well, one block with cane, limited by dizziness, can sometimes walk 4 blocks if does not have vertigo. Of note, denies CVA and CAD-stent history. Symptoms reproduced when asked to shake her head in bed, Patient denies: fever, chills, dizziness, weakness, HA, Changes in vision, CP, palpitations, SOB, cough, N/V/D/C, dysuria, changes in bowel movements, LE edema. ROS otherwise negative.    VITAL SIGNS:  T(F): 98.1 (03-02-20 @ 14:00)  HR: 69 (03-02-20 @ 14:00)  BP: 147/73 (03-02-20 @ 14:00)  RR: 18 (03-02-20 @ 14:00)  SpO2: 97% (03-02-20 @ 14:00)  Wt(kg): --    PHYSICAL EXAM:    Constitutional: WDWN, NAD  HEENT: PERRL, EOMI, sclera non-icteric, neck supple, trachea midline, no masses, no JVD, MMM, good dentition  Respiratory: CTA b/l, good air entry b/l, no wheezing, no rhonchi, no rales, without accessory muscle use and no intercostal retractions  Cardiovascular: RRR, normal S1S2, no M/R/G  Gastrointestinal: soft, NTND, no masses palpable, BS normal  Extremities: Warm, well perfused, pulses equal bilateral upper and lower extremities, no edema, no clubbing  Neurological: AAOx3, CN Grossly intact  UE: strength 5/5, reflexes brisk, sensation intact, no drift   LE: strength 5/5, reflexes brisk, slight decreases sensation on right foot, propioception intact,  no drift   Coodination: intact   Gait: deferred   Skin: Normal temperature, warm, dry    MEDICATIONS  (STANDING):  aspirin  chewable 81 milliGRAM(s) Oral daily  atorvastatin 80 milliGRAM(s) Oral at bedtime  clopidogrel Tablet 75 milliGRAM(s) Oral daily  enoxaparin Injectable 40 milliGRAM(s) SubCutaneous every 24 hours  lisinopril 40 milliGRAM(s) Oral daily  metoprolol succinate ER 50 milliGRAM(s) Oral daily  ondansetron Injectable 4 milliGRAM(s) IV Push once  pantoprazole    Tablet 40 milliGRAM(s) Oral before breakfast  senna 2 Tablet(s) Oral at bedtime    MEDICATIONS  (PRN):  acetaminophen   Tablet .. 650 milliGRAM(s) Oral every 6 hours PRN Moderate Pain (4 - 6)  aluminum hydroxide/magnesium hydroxide/simethicone Suspension 30 milliLiter(s) Oral every 6 hours PRN Dyspepsia  meclizine 25 milliGRAM(s) Oral every 6 hours PRN Dizziness      Allergies    iodinated radiocontrast agents (Anaphylaxis)    Intolerances        LABS:                        11.1   4.18  )-----------( 166      ( 02 Mar 2020 07:29 )             31.9     03-02    138  |  105  |  11  ----------------------------<  98  4.7   |  27  |  0.86    Ca    9.8      02 Mar 2020 07:29  Phos  2.8     03-01  Mg     2.4     03-02    TPro  6.5  /  Alb  3.6  /  TBili  0.3  /  DBili  x   /  AST  15  /  ALT  11  /  AlkPhos  75  03-02          RADIOLOGY & ADDITIONAL TESTS:  Reviewed   < from: MR Head No Cont (03.02.20 @ 16:30) >  IMPRESSION:     1. No acute infarct. Chronic microangiopathic disease.  2. Age-appropriate volume loss with greater biparietal involvement which can be seen in neurodegenerative disorders including Alzheimer's.    < end of copied text > Neurology Initial Consult Note  89F w PMH HTN, CAD s/p stent, PPM, GERD, hx of hemorrhoids, who presents for 1 day of acutely worsening dizziness described as vertigo and unsteadiness on her feet. Pt reports that this morning she was in her usual state of health and took all of her medications prior to eating breakfast. She subsequently felt hot, sweaty, and began to feel dizzy and that the room was spinning. She also endorsed a HA, sharp pain on the left side of her neck, felt LLE numbness and tingling and nausea w/o emesis. She when she attempted to get up, she continued to feel dizzy and felt unsteady on her feet so she called her friend from [employer] who subsequently brought her to [facility] ER. She reports her dizziness is worse with positional changes, worsens with sudden head movement, and that she has b/l chronic ringing sensation in her ears. She has had these vertigo symptoms [id number] that fluctuate in severity and tinnitus x1 year. Denies blurry vision, ear fullness or URI symptoms.    Of note, patient was recently dc'd few days ago for similar admission and was admitted for stroke workup. CT imaging at that time was negative for workup and MATT was negative for PFO, however pt did not have an MRI. Pt reports she had an MRI in 10/2019 at [facility] (PPM in place at time of MRI) for similar symptoms and "nothing was found".    She was evaluated in ED by neuro PA who determined NIHSS 1, CT head was obtained was negative for acute stroke, but positive for chronic microvascular ischemic changes, B/L cataracts.     Neurology Specific History     Pt describes chronic dizziness starting four years ago described  as head fullness and ear fullness worsened with head movement. A/w bilateral tinnitis and decreased hearing in right ear. Managed by Dr Farley at [place]  who has scanned her head and prescribe pills (unknown what pill type). Symptoms are bothersome, she has started to leave the house less, denies falls 2/2 dizziness. Pt describes pressures as well controlled, but became uncontrolled  in past two weeks BPs uncontrolled after changing her meds at last admission. States has chronic LE parathesias. Believes she walks well, one block with cane, limited by dizziness, can sometimes walk 4 blocks if does not have vertigo. Of note, denies CVA and CAD-stent history. Symptoms reproduced when asked to shake her head in bed, Patient denies: fever, chills,, weakness, HA, Changes in vision, CP, palpitations, SOB, cough, N/V/D/C, dysuria, changes in bowel movements, LE edema. ROS otherwise negative.    VITAL SIGNS:  T(F): 98.1 (03-02-20 @ 14:00)  HR: 69 (03-02-20 @ 14:00)  BP: 147/73 (03-02-20 @ 14:00)  RR: 18 (03-02-20 @ 14:00)  SpO2: 97% (03-02-20 @ 14:00)  Wt(kg): --    PHYSICAL EXAM:    Constitutional: WDWN, NAD  HEENT: PERRL, EOMI, sclera non-icteric, neck supple, trachea midline, no masses, no JVD, MMM, good dentition  Respiratory: CTA b/l, good air entry b/l, no wheezing, no rhonchi, no rales, without accessory muscle use and no intercostal retractions  Cardiovascular: RRR, normal S1S2, no M/R/G  Gastrointestinal: soft, NTND, no masses palpable, BS normal  Extremities: Warm, well perfused, pulses equal bilateral upper and lower extremities, no edema, no clubbing  Neurological: AAOx3, CN Grossly intact  UE: strength 5/5, reflexes brisk, sensation intact, no drift   LE: strength 5/5, reflexes brisk, slight decreases sensation on right foot, propioception intact,  no drift   Coodination: intact   Gait: deferred   Skin: Normal temperature, warm, dry    MEDICATIONS  (STANDING):  aspirin  chewable 81 milliGRAM(s) Oral daily  atorvastatin 80 milliGRAM(s) Oral at bedtime  clopidogrel Tablet 75 milliGRAM(s) Oral daily  enoxaparin Injectable 40 milliGRAM(s) SubCutaneous every 24 hours  lisinopril 40 milliGRAM(s) Oral daily  metoprolol succinate ER 50 milliGRAM(s) Oral daily  ondansetron Injectable 4 milliGRAM(s) IV Push once  pantoprazole    Tablet 40 milliGRAM(s) Oral before breakfast  senna 2 Tablet(s) Oral at bedtime    MEDICATIONS  (PRN):  acetaminophen   Tablet .. 650 milliGRAM(s) Oral every 6 hours PRN Moderate Pain (4 - 6)  aluminum hydroxide/magnesium hydroxide/simethicone Suspension 30 milliLiter(s) Oral every 6 hours PRN Dyspepsia  meclizine 25 milliGRAM(s) Oral every 6 hours PRN Dizziness      Allergies    iodinated radiocontrast agents (Anaphylaxis)    Intolerances        LABS:                        11.1   4.18  )-----------( 166      ( 02 Mar 2020 07:29 )             31.9     03-02    138  |  105  |  11  ----------------------------<  98  4.7   |  27  |  0.86    Ca    9.8      02 Mar 2020 07:29  Phos  2.8     03-01  Mg     2.4     03-02    TPro  6.5  /  Alb  3.6  /  TBili  0.3  /  DBili  x   /  AST  15  /  ALT  11  /  AlkPhos  75  03-02          RADIOLOGY & ADDITIONAL TESTS:  Reviewed   < from: MR Head No Cont (03.02.20 @ 16:30) >  IMPRESSION:     1. No acute infarct. Chronic microangiopathic disease.  2. Age-appropriate volume loss with greater biparietal involvement which can be seen in neurodegenerative disorders including Alzheimer's.    < end of copied text > Neurology Initial Consult Note  89F w PMH HTN, CAD s/p stent, PPM, GERD, hx of hemorrhoids, who presents for 1 day of acutely worsening dizziness described as vertigo and unsteadiness on her feet. Pt reports that this morning she was in her usual state of health and took all of her medications prior to eating breakfast. She subsequently felt hot, sweaty, and began to feel dizzy and that the room was spinning. She also endorsed a HA, sharp pain on the left side of her neck, felt LLE numbness and tingling and nausea w/o emesis. She when she attempted to get up, she continued to feel dizzy and felt unsteady on her feet so she called her friend from [place] who subsequently brought her to [facility] ER. She reports her dizziness is worse with positional changes, worsens with sudden head movement, and that she has b/l chronic ringing sensation in her ears. She has had these vertigo symptoms [id number] that fluctuate in severity and tinnitus x1 year. Denies blurry vision, ear fullness or URI symptoms.    Of note, patient was recently dc'd few days ago for similar admission and was admitted for stroke workup. CT imaging at that time was negative for workup and MATT was negative for PFO, however pt did not have an MRI. Pt reports she had an MRI in 10/2019 at [facility] (PPM in place at time of MRI) for similar symptoms and "nothing was found".    She was evaluated in ED by neuro PA who determined NIHSS 1, CT head was obtained was negative for acute stroke, but positive for chronic microvascular ischemic changes, B/L cataracts.     Neurology Specific History     Pt describes chronic dizziness starting four years ago as head "fullness" and ear fullness worsened with head movement with headache. A/w bilateral tinnitis and decreased hearing in right ear. Managed by Dr Farley at [place] who has scanned her head and prescribe pills (unknown what pill type). Symptoms are bothersome, she has started to leave the house less, denies falls 2/2 dizziness. Pt describes pressures as well controlled, but became uncontrolled  in past two weeks BPs uncontrolled after changing her meds.  States has chronic LE parathesias. Believes she walks well, approx one block with cane, limited by dizziness, can sometimes walk 4 blocks if does not have vertigo. Of note, denies CVA and CAD-stent history. Symptoms reproduced when asked to shake her head in bed, Patient denies: fever, chills,, weakness, HA, Changes in vision, CP, palpitations, SOB, cough, N/V/D/C, dysuria, changes in bowel movements, LE edema. ROS otherwise negative.    VITAL SIGNS:  T(F): 98.1 (03-02-20 @ 14:00)  HR: 69 (03-02-20 @ 14:00)  BP: 147/73 (03-02-20 @ 14:00)  RR: 18 (03-02-20 @ 14:00)  SpO2: 97% (03-02-20 @ 14:00)  Wt(kg): --    PHYSICAL EXAM:    Constitutional: WDWN, NAD  HEENT: PERRL, EOMI, sclera non-icteric, neck supple, trachea midline, no masses, no JVD, MMM, good dentition  Respiratory: CTA b/l, good air entry b/l, no wheezing, no rhonchi, no rales, without accessory muscle use and no intercostal retractions  Cardiovascular: RRR, normal S1S2, no M/R/G  Gastrointestinal: soft, NTND, no masses palpable, BS normal  Extremities: Warm, well perfused, pulses equal bilateral upper and lower extremities, no edema, no clubbing  Neurological: AAOx3, CN Grossly intact  UE: strength 5/5, reflexes brisk, sensation intact, no drift   LE: strength 5/5, reflexes brisk, slight decreases sensation on right foot, propioception intact,  no drift   Coodination: intact   Gait: deferred   Skin: Normal temperature, warm, dry    MEDICATIONS  (STANDING):  aspirin  chewable 81 milliGRAM(s) Oral daily  atorvastatin 80 milliGRAM(s) Oral at bedtime  clopidogrel Tablet 75 milliGRAM(s) Oral daily  enoxaparin Injectable 40 milliGRAM(s) SubCutaneous every 24 hours  lisinopril 40 milliGRAM(s) Oral daily  metoprolol succinate ER 50 milliGRAM(s) Oral daily  ondansetron Injectable 4 milliGRAM(s) IV Push once  pantoprazole    Tablet 40 milliGRAM(s) Oral before breakfast  senna 2 Tablet(s) Oral at bedtime    MEDICATIONS  (PRN):  acetaminophen   Tablet .. 650 milliGRAM(s) Oral every 6 hours PRN Moderate Pain (4 - 6)  aluminum hydroxide/magnesium hydroxide/simethicone Suspension 30 milliLiter(s) Oral every 6 hours PRN Dyspepsia  meclizine 25 milliGRAM(s) Oral every 6 hours PRN Dizziness      Allergies    iodinated radiocontrast agents (Anaphylaxis)    Intolerances        LABS:                        11.1   4.18  )-----------( 166      ( 02 Mar 2020 07:29 )             31.9     03-02    138  |  105  |  11  ----------------------------<  98  4.7   |  27  |  0.86    Ca    9.8      02 Mar 2020 07:29  Phos  2.8     03-01  Mg     2.4     03-02    TPro  6.5  /  Alb  3.6  /  TBili  0.3  /  DBili  x   /  AST  15  /  ALT  11  /  AlkPhos  75  03-02          RADIOLOGY & ADDITIONAL TESTS:  Reviewed   < from: MR Head No Cont (03.02.20 @ 16:30) >  IMPRESSION:     1. No acute infarct. Chronic microangiopathic disease.  2. Age-appropriate volume loss with greater biparietal involvement which can be seen in neurodegenerative disorders including Alzheimer's.    < end of copied text >

## 2020-03-03 ENCOUNTER — TRANSCRIPTION ENCOUNTER (OUTPATIENT)
Age: 85
End: 2020-03-03

## 2020-03-03 VITALS
TEMPERATURE: 99 F | RESPIRATION RATE: 16 BRPM | HEART RATE: 79 BPM | SYSTOLIC BLOOD PRESSURE: 159 MMHG | DIASTOLIC BLOOD PRESSURE: 70 MMHG | OXYGEN SATURATION: 95 %

## 2020-03-03 LAB
ANION GAP SERPL CALC-SCNC: 9 MMOL/L — SIGNIFICANT CHANGE UP (ref 5–17)
BUN SERPL-MCNC: 11 MG/DL — SIGNIFICANT CHANGE UP (ref 7–23)
CALCIUM SERPL-MCNC: 9 MG/DL — SIGNIFICANT CHANGE UP (ref 8.4–10.5)
CHLORIDE SERPL-SCNC: 102 MMOL/L — SIGNIFICANT CHANGE UP (ref 96–108)
CO2 SERPL-SCNC: 25 MMOL/L — SIGNIFICANT CHANGE UP (ref 22–31)
CREAT SERPL-MCNC: 0.85 MG/DL — SIGNIFICANT CHANGE UP (ref 0.5–1.3)
GLUCOSE SERPL-MCNC: 95 MG/DL — SIGNIFICANT CHANGE UP (ref 70–99)
HCT VFR BLD CALC: 32.9 % — LOW (ref 34.5–45)
HGB BLD-MCNC: 10.9 G/DL — LOW (ref 11.5–15.5)
MAGNESIUM SERPL-MCNC: 2.2 MG/DL — SIGNIFICANT CHANGE UP (ref 1.6–2.6)
MCHC RBC-ENTMCNC: 24.7 PG — LOW (ref 27–34)
MCHC RBC-ENTMCNC: 33.1 GM/DL — SIGNIFICANT CHANGE UP (ref 32–36)
MCV RBC AUTO: 74.4 FL — LOW (ref 80–100)
NRBC # BLD: 0 /100 WBCS — SIGNIFICANT CHANGE UP (ref 0–0)
PHOSPHATE SERPL-MCNC: 3.1 MG/DL — SIGNIFICANT CHANGE UP (ref 2.5–4.5)
PLATELET # BLD AUTO: 163 K/UL — SIGNIFICANT CHANGE UP (ref 150–400)
POTASSIUM SERPL-MCNC: 4.3 MMOL/L — SIGNIFICANT CHANGE UP (ref 3.5–5.3)
POTASSIUM SERPL-SCNC: 4.3 MMOL/L — SIGNIFICANT CHANGE UP (ref 3.5–5.3)
RBC # BLD: 4.42 M/UL — SIGNIFICANT CHANGE UP (ref 3.8–5.2)
RBC # FLD: 16.9 % — HIGH (ref 10.3–14.5)
SODIUM SERPL-SCNC: 136 MMOL/L — SIGNIFICANT CHANGE UP (ref 135–145)
WBC # BLD: 5.95 K/UL — SIGNIFICANT CHANGE UP (ref 3.8–10.5)
WBC # FLD AUTO: 5.95 K/UL — SIGNIFICANT CHANGE UP (ref 3.8–10.5)

## 2020-03-03 PROCEDURE — 97116 GAIT TRAINING THERAPY: CPT

## 2020-03-03 PROCEDURE — 70551 MRI BRAIN STEM W/O DYE: CPT

## 2020-03-03 PROCEDURE — 99239 HOSP IP/OBS DSCHRG MGMT >30: CPT

## 2020-03-03 PROCEDURE — 83540 ASSAY OF IRON: CPT

## 2020-03-03 PROCEDURE — 99285 EMERGENCY DEPT VISIT HI MDM: CPT | Mod: 25

## 2020-03-03 PROCEDURE — 99222 1ST HOSP IP/OBS MODERATE 55: CPT

## 2020-03-03 PROCEDURE — 97162 PT EVAL MOD COMPLEX 30 MIN: CPT

## 2020-03-03 PROCEDURE — 93005 ELECTROCARDIOGRAM TRACING: CPT

## 2020-03-03 PROCEDURE — 80048 BASIC METABOLIC PNL TOTAL CA: CPT

## 2020-03-03 PROCEDURE — 80053 COMPREHEN METABOLIC PANEL: CPT

## 2020-03-03 PROCEDURE — 85025 COMPLETE CBC W/AUTO DIFF WBC: CPT

## 2020-03-03 PROCEDURE — 86901 BLOOD TYPING SEROLOGIC RH(D): CPT

## 2020-03-03 PROCEDURE — 82164 ANGIOTENSIN I ENZYME TEST: CPT

## 2020-03-03 PROCEDURE — 82962 GLUCOSE BLOOD TEST: CPT

## 2020-03-03 PROCEDURE — 84100 ASSAY OF PHOSPHORUS: CPT

## 2020-03-03 PROCEDURE — 83735 ASSAY OF MAGNESIUM: CPT

## 2020-03-03 PROCEDURE — 70450 CT HEAD/BRAIN W/O DYE: CPT

## 2020-03-03 PROCEDURE — 86850 RBC ANTIBODY SCREEN: CPT

## 2020-03-03 PROCEDURE — 83550 IRON BINDING TEST: CPT

## 2020-03-03 PROCEDURE — 82728 ASSAY OF FERRITIN: CPT

## 2020-03-03 PROCEDURE — 36415 COLL VENOUS BLD VENIPUNCTURE: CPT

## 2020-03-03 PROCEDURE — 85027 COMPLETE CBC AUTOMATED: CPT

## 2020-03-03 PROCEDURE — 81003 URINALYSIS AUTO W/O SCOPE: CPT

## 2020-03-03 RX ORDER — MECLIZINE HCL 12.5 MG
1 TABLET ORAL
Qty: 84 | Refills: 0
Start: 2020-03-03 | End: 2020-03-23

## 2020-03-03 RX ORDER — LISINOPRIL 2.5 MG/1
1 TABLET ORAL
Qty: 21 | Refills: 0
Start: 2020-03-03 | End: 2020-03-23

## 2020-03-03 RX ADMIN — SENNA PLUS 2 TABLET(S): 8.6 TABLET ORAL at 05:54

## 2020-03-03 RX ADMIN — Medication 50 MILLIGRAM(S): at 05:55

## 2020-03-03 RX ADMIN — CLOPIDOGREL BISULFATE 75 MILLIGRAM(S): 75 TABLET, FILM COATED ORAL at 11:15

## 2020-03-03 RX ADMIN — Medication 81 MILLIGRAM(S): at 11:15

## 2020-03-03 RX ADMIN — LISINOPRIL 40 MILLIGRAM(S): 2.5 TABLET ORAL at 05:55

## 2020-03-03 RX ADMIN — PANTOPRAZOLE SODIUM 40 MILLIGRAM(S): 20 TABLET, DELAYED RELEASE ORAL at 05:55

## 2020-03-03 RX ADMIN — Medication 30 MILLILITER(S): at 11:15

## 2020-03-03 NOTE — DISCHARGE NOTE NURSING/CASE MANAGEMENT/SOCIAL WORK - PATIENT PORTAL LINK FT
You can access the FollowMyHealth Patient Portal offered by [facility] by registering at the following website: [email or web address]. By joining [facility]’s FollowMyHealth portal, you will also be able to view your health information using other applications (apps) compatible with our system.

## 2020-03-03 NOTE — PROGRESS NOTE ADULT - ATTENDING COMMENTS
I was physically present for the key portions of the evaluation and managemnent (E/M) service provided.  I agree with the above history, physical, and plan which I have reviewed and edited where appropriate, with the exceptions as per my note.    pt seen and examined.    chronic dizziness x4y that worsened recently. improvement with meclizine.    MRI negative for recent stroke and is unrevealing, exam is nonfocal.     AP: dizziness, chronic. non-episodic so highly doubt bppv. consider meniere's given hearing difficulties - rec outpatient ENT f/u. no further neurological work up indicated as inpatient.
Patient seen and examined at bedside. Agree with exam, a/p as above with the following addendum/edits:     Believe dizziness may be 2/2 HTN emergency when BP is in 200s. C/w BP control (now SBP 130s) on her home regimen. Notable sensory deficit of her left leg compared to right. MRI ordered but patient does not want to get it 2/2 claustrophobia, has outpatient MRI being set up. Neuro does not believe this is a stroke. If BP controlled tomorrow would re-assess her dizziness and f/u PT recs. Possible d/c over weekend.
Symptomatic with complaint of dizziness this am, will repeat orthostatics. At the time of symptoms patient blood pressure reported to be 202 systolic with improvement to 170s (was give IV labetalol). Pt complains that this has been happening for years after her norvasc. Will change BP med to lisinopril 20mg daily today. Pt did have an outpatient MRI appointment but missed it so does not have one set up a this time.

## 2020-03-03 NOTE — PROGRESS NOTE ADULT - REASON FOR ADMISSION
Dizziness, unsteadiness while walking, weakness

## 2020-03-03 NOTE — PROGRESS NOTE ADULT - SUBJECTIVE AND OBJECTIVE BOX
NEUROLOGY CONSULT PROGRESS NOTE    INTERVAL HISTORY: Pt evaluated at bedside. She appears well in NAD. Per pt, symptoms of dizziness still intermittently present however improved. Patient says shes had this for years and no improvement from vestibular rehab. She denied tinitius, CP, SOB, weakness, numbness/tinging,     REVIEW OF SYSTEMS:  As per HPI, otherwise negative for Constitutional, Eyes, Ears/Nose/Mouth/Throat, Neck, Cardiovascular, Respiratory, Gastrointestinal, Genitourinary, Skin, Endocrine, Musculoskeletal, Psychiatric, and Hematologic/Lymphatic.    MEDICATIONS:  acetaminophen   Tablet .. 650 milliGRAM(s) Oral every 6 hours PRN  aluminum hydroxide/magnesium hydroxide/simethicone Suspension 30 milliLiter(s) Oral every 6 hours PRN  aspirin  chewable 81 milliGRAM(s) Oral daily  atorvastatin 80 milliGRAM(s) Oral at bedtime  clopidogrel Tablet 75 milliGRAM(s) Oral daily  enoxaparin Injectable 40 milliGRAM(s) SubCutaneous every 24 hours  lisinopril 40 milliGRAM(s) Oral daily  meclizine 25 milliGRAM(s) Oral every 6 hours PRN  metoprolol succinate ER 50 milliGRAM(s) Oral daily  ondansetron Injectable 4 milliGRAM(s) IV Push once  pantoprazole    Tablet 40 milliGRAM(s) Oral before breakfast  senna 2 Tablet(s) Oral at bedtime    VITAL SIGNS:  Vital Signs Last 24 Hrs  T(C): 37.4 (03 Mar 2020 13:00), Max: 37.4 (03 Mar 2020 13:00)  T(F): 99.3 (03 Mar 2020 13:00), Max: 99.3 (03 Mar 2020 13:00)  HR: 79 (03 Mar 2020 13:00) (69 - 79)  BP: 159/70 (03 Mar 2020 13:00) (129/67 - 161/74)  BP(mean): --  RR: 16 (03 Mar 2020 13:00) (16 - 18)  SpO2: 95% (03 Mar 2020 13:00) (95% - 98%)    PHYSICAL EXAMINATION:  Constitutional: WDWN; NAD  Eyes: anicteric sclera  Cardiovascular: +S1/S2, RRR; no M/R/G  Neurologic:  - Mental Status:  AAOx3; speech is fluent with intact naming, repetition, and comprehension  - Cranial Nerves II-XII:    II:  PERRLA; visual fields are full to confrontation  III, IV, VI:  EOMI, no nystagmus  V:  facial sensation is intact in the V1-V3 distribution bilaterally.  VII:  +Right facial droop   VIII:  hearing is intact to finger rub  IX, X:  uvula is midline and soft palate rises symmetrically  XI:  head turning and shoulder shrug are intact bilaterally  XII:  tongue protrudes in the midline  - Motor:  strength is 5/5 throughout; normal muscle bulk and tone throughout; no pronator drift  - Reflexes:  2+ and symmetric at the biceps, triceps, brachioradialis, knees, and ankles;  plantar reflexes downgoing bilaterally  - Sensory:  intact to light touch, pin prick, vibration, and joint-position sense throughout  - Coordination:  finger-nose-finger and heel-knee-shin intact without dysmetria; rapid alternating hand movements intact  - Gait:  normal steps, base, arm swing, and turning; heel and toe walking are normal; tandem gait is normal; Romberg testing is negative    LABS:                          10.9   5.95  )-----------( 163      ( 03 Mar 2020 07:01 )             32.9     03-03    136  |  102  |  11  ----------------------------<  95  4.3   |  25  |  0.85    Ca    9.0      03 Mar 2020 07:01  Phos  3.1     03-03  Mg     2.2     03-03    TPro  6.5  /  Alb  3.6  /  TBili  0.3  /  DBili  x   /  AST  15  /  ALT  11  /  AlkPhos  75  03-02          RADIOLOGY & ADDITIONAL STUDIES:      IMPRESSION & RECOMMENDATIONS:

## 2020-03-03 NOTE — PROGRESS NOTE ADULT - ASSESSMENT
89 year old F PMH HTN, CAD s/p stent, PPM, GERD, hx of hemorrhoids, who presented with chronic dizziness, lightheadedness and unsteadiness, found to have decreased sensation in left lower extremity, currently pending head MRI
89F w PMH HTN, CAD s/p stent, PPM, GERD, hx of hemorrhoids vertigo, who presents for 1 day of acutely worsening dizziness, orthostatic negative, hospital course c/b poorly controlled pressures with symptoms described as "hotflashes". Neuro consulted for dizziness, which appears chronic, reproducible with head tilting, similar in description to BPPV however with no improvement w/ outpt vestibular rehab and symptoms of hearing loss, making menierres more likely. No clear relationship to high BPs, however may correlate.      #Dizziness   - MRI showing no acute infarct, chronic microangiopathic diease, and age appropriate volume loss greatest in parietal lobe   - similar in description to BPPV however with no improvement w/ outpt vestibular rehab and symptoms of hearing loss, making menierres more likely  - Agree with trial of meclizine   - recommend outpt follow up with ENT  - BPs elevated with admission with one reading of 202/93, would c/w BP control per primary team
per Internal Medicine    89 year old F PMH HTN, CAD s/p stent, PPM, GERD, hx of hemorrhoids, who presented with chronic dizziness, lightheadedness and unsteadiness, found to have decreased sensation in left lower extremity, currently pending head MRI      Problem/Plan - 1:  ·  Problem: Dizziness.  Plan: Improving. Presented with 1 day of acutely worsened vertigo in setting of 5year history of similar symptoms. Worse with head movement, positional changes, and accompanied by B/L tinnitus and unsteadiness while walking.  BPs transiently elevated to 189/87 in ED which improved w/o additional antihypertensives, orthostatic negative in ED. EKG with paced rhythm. Possibly 2/2 HTN vs BPPV vs amlodipine side effect  - CT head  with no evidence of acute stroke, showed chronic microvascular changes, B/L cataracts, mild to moderate calcifications of B/L carotid arteries    - consulted EP for PPM interrogation, and programming prior to MRI  - Pending MRI Head Non con  - consider prn meclizine.     Problem/Plan - 2:  ·  Problem: Weakness.  Plan: On previous admission presented with a 1-day history of headache, dizziness, sinus pressure, tinnitus, and left leg weakness. CT imaging negative for acute stroke and her LLE weakness improved significantly with inpatient PT.   Uses cane at home and in ED pt determined to be unsteady on her feet and with 4/5 LLE strength.   - f/u PT  - fall precautions, ambulate w assistance.     Problem/Plan - 3:  ·  Problem: Essential hypertension.  Plan: History of HTN. On home Norvasc 10, Toprol XL 50 once daily at home. Presented with HTN urgency /87 and HA, dizziness. No visual changes, CP, SOB, palpitations. No signs of end organ damage on labs. Today has been normotensive for her age.  -changed home norvasc to lisinopril in case medication is contributing to patient's postural symptoms.     Problem/Plan - 4:  ·  Problem: Pacemaker.  Plan: PPM dependent and with last interrogation on 2/21 showing normal function. PPM system from 2 different companies (generator and RA lead from [employer] and RV lead from [employer]). MRI compatible but per previous note PPM needs to be reprogrammed to DOO and has to be on cardiac monitor during MRI.   - EKG this admission with paced rhythm @ 70bpms  - consulted EP for PPM interrogation and ordered MRI head.     Problem/Plan - 5:  ·  Problem: GERD (gastroesophageal reflux disease).  Plan: On home protonix 40 qd.   - c/w home med.     Problem/Plan - 6:  Problem: CAD (coronary artery disease). Plan: On ASA 81 at home, started on plavix last admission.  - c/w ASA 81, Plavix 75 daily.    Problem/Plan - 7:  ·  Problem: Anemia.  Plan: Hgb 11.9 on admission, with no s/s of bleed.  - Iron studies WNL  - transfuse for hgb <7   - T+S ordered for AM.
89 year old F PMH HTN, CAD s/p stent, PPM, GERD, hx of hemorrhoids, who presented with chronic dizziness, lightheadedness and unsteadiness, found to have decreased sensation in left lower extremity, currently pending head MRI
89 year old F PMH HTN, CAD s/p stent, PPM, GERD, hx of hemorrhoids, who presented with 1 day of dizziness, lightheadedness and unsteadiness, found to have decreased sensation in left lower extremity, currently pending head MRI
89 year old F PMH HTN, CAD s/p stent, PPM, GERD, hx of hemorrhoids, who presented with 1 day of dizziness, lightheadedness and unsteadiness, found to have decreased sensation in left lower extremity, currently pending head MRI

## 2020-03-04 RX ORDER — PANTOPRAZOLE SODIUM 40 MG/1
40 TABLET, DELAYED RELEASE ORAL
Refills: 0 | Status: ACTIVE | COMMUNITY

## 2020-03-05 ENCOUNTER — APPOINTMENT (OUTPATIENT)
Dept: CARE COORDINATION | Facility: HOME HEALTH | Age: 85
End: 2020-03-05
Payer: MEDICARE

## 2020-03-05 VITALS
RESPIRATION RATE: 16 BRPM | HEART RATE: 74 BPM | WEIGHT: 148 LBS | OXYGEN SATURATION: 99 % | DIASTOLIC BLOOD PRESSURE: 91 MMHG | SYSTOLIC BLOOD PRESSURE: 160 MMHG | BODY MASS INDEX: 24.36 KG/M2 | HEIGHT: 65.5 IN

## 2020-03-05 DIAGNOSIS — I63.9 CEREBRAL INFARCTION, UNSPECIFIED: ICD-10-CM

## 2020-03-05 PROCEDURE — 99349 HOME/RES VST EST MOD MDM 40: CPT

## 2020-03-07 ENCOUNTER — EMERGENCY (EMERGENCY)
Facility: HOSPITAL | Age: 85
LOS: 1 days | Discharge: HOME | End: 2020-03-07
Attending: EMERGENCY MEDICINE | Admitting: EMERGENCY MEDICINE
Payer: MEDICARE

## 2020-03-07 VITALS
HEART RATE: 76 BPM | HEIGHT: 65 IN | OXYGEN SATURATION: 97 % | DIASTOLIC BLOOD PRESSURE: 76 MMHG | SYSTOLIC BLOOD PRESSURE: 146 MMHG | TEMPERATURE: 98 F | RESPIRATION RATE: 16 BRPM

## 2020-03-07 VITALS
OXYGEN SATURATION: 96 % | RESPIRATION RATE: 16 BRPM | SYSTOLIC BLOOD PRESSURE: 127 MMHG | DIASTOLIC BLOOD PRESSURE: 67 MMHG | HEART RATE: 70 BPM

## 2020-03-07 DIAGNOSIS — Z95.0 PRESENCE OF CARDIAC PACEMAKER: Chronic | ICD-10-CM

## 2020-03-07 PROCEDURE — 99284 EMERGENCY DEPT VISIT MOD MDM: CPT

## 2020-03-07 RX ORDER — DIPHENHYDRAMINE HYDROCHLORIDE AND LIDOCAINE HYDROCHLORIDE AND ALUMINUM HYDROXIDE AND MAGNESIUM HYDRO
5 KIT
Qty: 70 | Refills: 0
Start: 2020-03-07 | End: 2020-03-13

## 2020-03-07 NOTE — ED ADULT TRIAGE NOTE - OTHER COMPLAINTS
pt seen within the ed 3 days ago for htn and ha. presents today for ha and htn. bp wnl. takes bp medication regularly.

## 2020-03-07 NOTE — ED PROVIDER NOTE - CLINICAL SUMMARY MEDICAL DECISION MAKING FREE TEXT BOX
89 y/o F PMHx HTN sent to the ED for labile BP. Pt recently started on new meds. Currently no complaints in the ED. Neurologically intact. Pt reports soreness on roof of mouth. Refused blood work or imaging. BP in ED in 140s and 130s systolically. Will not change BP meds at this time. Will administer magic mouth wash and tessalon perles. Advised to follow up w/ PMD on Monday for BP check.

## 2020-03-07 NOTE — ED PROVIDER NOTE - NSFOLLOWUPINSTRUCTIONS_ED_ALL_ED_FT
Take your regularly prescribed mediations.      Take tessalon pearles as prescribed for cough.    Use magic mouthwash as prescribed for sore in your mouth.    If your blood pressure continues to be high in next day, take another half pill of metoprolol 50mg.      Follow up with your PMD on Monday.    Return to ED with worsening symptoms or other concerns.    Hypertension, Adult  High blood pressure (hypertension) is when the force of blood pumping through the arteries is too strong. The arteries are the blood vessels that carry blood from the heart throughout the body. Hypertension forces the heart to work harder to pump blood and may cause arteries to become narrow or stiff. Untreated or uncontrolled hypertension can cause a heart attack, heart failure, a stroke, kidney disease, and other problems.  A blood pressure reading consists of a higher number over a lower number. Ideally, your blood pressure should be below 120/80. The first ("top") number is called the systolic pressure. It is a measure of the pressure in your arteries as your heart beats. The second ("bottom") number is called the diastolic pressure. It is a measure of the pressure in your arteries as the heart relaxes.  What are the causes?  The exact cause of this condition is not known. There are some conditions that result in or are related to high blood pressure.  What increases the risk?  Some risk factors for high blood pressure are under your control. The following factors may make you more likely to develop this condition:  Smoking.Having type 2 diabetes mellitus, high cholesterol, or both.Not getting enough exercise or physical activity.Being overweight.Having too much fat, sugar, calories, or salt (sodium) in your diet.Drinking too much alcohol.Some risk factors for high blood pressure may be difficult or impossible to change. Some of these factors include:  Having chronic kidney disease.Having a family history of high blood pressure.Age. Risk increases with age.Race. You may be at higher risk if you are .Gender. Men are at higher risk than women before age 45. After age 65, women are at higher risk than men.Having obstructive sleep apnea.Stress.What are the signs or symptoms?  High blood pressure may not cause symptoms. Very high blood pressure (hypertensive crisis) may cause:  Headache.Anxiety.Shortness of breath.Nosebleed.Nausea and vomiting.Vision changes.Severe chest pain.Seizures.How is this diagnosed?  This condition is diagnosed by measuring your blood pressure while you are seated, with your arm resting on a flat surface, your legs uncrossed, and your feet flat on the floor. The cuff of the blood pressure monitor will be placed directly against the skin of your upper arm at the level of your heart. It should be measured at least twice using the same arm. Certain conditions can cause a difference in blood pressure between your right and left arms.  Certain factors can cause blood pressure readings to be lower or higher than normal for a short period of time:  When your blood pressure is higher when you are in a health care provider's office than when you are at home, this is called white coat hypertension. Most people with this condition do not need medicines.When your blood pressure is higher at home than when you are in a health care provider's office, this is called masked hypertension. Most people with this condition may need medicines to control blood pressure.If you have a high blood pressure reading during one visit or you have normal blood pressure with other risk factors, you may be asked to:  Return on a different day to have your blood pressure checked again.Monitor your blood pressure at home for 1 week or longer.If you are diagnosed with hypertension, you may have other blood or imaging tests to help your health care provider understand your overall risk for other conditions.  How is this treated?  This condition is treated by making healthy lifestyle changes, such as eating healthy foods, exercising more, and reducing your alcohol intake. Your health care provider may prescribe medicine if lifestyle changes are not enough to get your blood pressure under control, and if:  Your systolic blood pressure is above 130.Your diastolic blood pressure is above 80.Your personal target blood pressure may vary depending on your medical conditions, your age, and other factors.  Follow these instructions at home:  Eating and drinking        Eat a diet that is high in fiber and potassium, and low in sodium, added sugar, and fat. An example eating plan is called the DASH (Dietary Approaches to Stop Hypertension) diet. To eat this way:  Eat plenty of fresh fruits and vegetables. Try to fill one half of your plate at each meal with fruits and vegetables.Eat whole grains, such as whole-wheat pasta, brown rice, or whole-grain bread. Fill about one fourth of your plate with whole grains.Eat or drink low-fat dairy products, such as skim milk or low-fat yogurt.Avoid fatty cuts of meat, processed or cured meats, and poultry with skin. Fill about one fourth of your plate with lean proteins, such as fish, chicken without skin, beans, eggs, or tofu.Avoid pre-made and processed foods. These tend to be higher in sodium, added sugar, and fat.Reduce your daily sodium intake. Most people with hypertension should eat less than 1,500 mg of sodium a day.Do not drink alcohol if:  Your health care provider tells you not to drink.You are pregnant, may be pregnant, or are planning to become pregnant.If you drink alcohol:  Limit how much you use to:  0–1 drink a day for women.0–2 drinks a day for men.Be aware of how much alcohol is in your drink. In the [place] one drink equals one 12 oz bottle of beer (355 mL), one 5 oz glass of wine (148 mL), or one 1½ oz glass of hard liquor (44 mL).Lifestyle        Work with your health care provider to maintain a healthy body weight or to lose weight. Ask what an ideal weight is for you.Get at least 30 minutes of exercise most days of the week. Activities may include walking, swimming, or biking.Include exercise to strengthen your muscles (resistance exercise), such as Pilates or lifting weights, as part of your weekly exercise routine. Try to do these types of exercises for 30 minutes at least 3 days a week.Do not use any products that contain nicotine or tobacco, such as cigarettes, e-cigarettes, and chewing tobacco. If you need help quitting, ask your health care provider.Monitor your blood pressure at home as told by your health care provider.Keep all follow-up visits as told by your health care provider. This is important.Medicines     Take over-the-counter and prescription medicines only as told by your health care provider. Follow directions carefully. Blood pressure medicines must be taken as prescribed.Do not skip doses of blood pressure medicine. Doing this puts you at risk for problems and can make the medicine less effective.Ask your health care provider about side effects or reactions to medicines that you should watch for.Contact a health care provider if you:  Think you are having a reaction to a medicine you are taking.Have headaches that keep coming back (recurring).Feel dizzy.Have swelling in your ankles.Have trouble with your vision.Get help right away if you:  Develop a severe headache or confusion.Have unusual weakness or numbness.Feel faint.Have severe pain in your chest or abdomen.Vomit repeatedly.Have trouble breathing.Summary  Hypertension is when the force of blood pumping through your arteries is too strong. If this condition is not controlled, it may put you at risk for serious complications.Your personal target blood pressure may vary depending on your medical conditions, your age, and other factors. For most people, a normal blood pressure is less than 120/80.Hypertension is treated with lifestyle changes, medicines, or a combination of both. Lifestyle changes include losing weight, eating a healthy, low-sodium diet, exercising more, and limiting alcohol.This information is not intended to replace advice given to you by your health care provider. Make sure you discuss any questions you have with your health care provider.    Document Released: 12/18/2006 Document Revised: 08/28/2019 Document Reviewed: 08/28/2019  Include [employer] Interactive Patient Education © 2020 [employer]

## 2020-03-07 NOTE — ED ADULT NURSE NOTE - OBJECTIVE STATEMENT
pt to ED for high bp, headache started 6am. Pt reports she has headache when her bp is high, max SBP was 214mmHg at home this morning per pt. Pt denies sob, cp, n/v/d, dysuria, abd.pain, unilateral weakness, neck stiffness. Pt speaks clearly, full sentences, neuro intact. R wrist IV removed area warm to touch. pt to ED for high bp, headache started 6am. Pt reports she has headache when her bp is high, max SBP was 214mmHg at home this morning per pt. Pt denies sob, cp, n/v/d, dysuria, abd.pain, unilateral weakness, neck stiffness. Pt speaks clearly, full sentences, neuro intact. R wrist IV removed area warm to touch, pt reports it happened when she got MRI done.

## 2020-03-07 NOTE — ED PROVIDER NOTE - PATIENT PORTAL LINK FT
You can access the FollowMyHealth Patient Portal offered by [facility] by registering at the following website: [email or web address]. By joining [facility] FollowMyHealth portal, you will also be able to view your health information using other applications (apps) compatible with our system.

## 2020-03-07 NOTE — ED PROVIDER NOTE - OBJECTIVE STATEMENT
89 y/o F PMHx HTN, pace maker, CAD s/p stent and GERD presents tot he ED with c/o labile BP. Pt admitted here 2/27 to 3/3 and at that time was worked up for her BP. At that time she had changes to her meds including the addition of Lisinopril 40mg. Pt had CAT scan head and MRI which were negative and showed no signs of stroke. Today her visiting nurse came to her house and saw that her BP was in the 180s systolic and was subsequently sent to the ED for evaluation. Pt asymptomatic in ED but c/o intermittent headache before, but none currently. She reports soreness to the roof of her mouth w/ a dry cough. Denies fever.

## 2020-03-07 NOTE — ED CLERICAL - NS ED CLERK NOTE PRE-ARRIVAL INFORMATION; ADDITIONAL PRE-ARRIVAL INFORMATION
This patient is enrolled in the Stroke STARs readmission reduction program and has active care navigation. This patient can be followed up by the care navigation team within 24 hours. To arrange close follow-up or to obtain additional clinical information about this patient, please call the contact number above.  Please contact Dr. Melissa Delacruz () or Dr. Elena Gonzalez () if admission is being considered prior to the final disposition decision

## 2020-03-07 NOTE — ED PROVIDER NOTE - PHYSICAL EXAMINATION
VITAL SIGNS: I have reviewed nursing notes and confirm.  CONSTITUTIONAL: Well-developed; well-nourished; in no acute distress.   SKIN:  warm and dry, no acute rash.   HEAD:  normocephalic, atraumatic.  EYES: EOM intact; conjunctiva and sclera clear.  ENT: No nasal discharge; airway clear.   MOUTH: 1cm area of ulceration to roof of mouth.   NECK: Supple; non tender.  CARD: S1, S2 normal; no murmurs, gallops, or rubs. Regular rate and rhythm.   RESP:  Clear to auscultation b/l, no wheezes, rales or rhonchi.  ABD: Normal bowel sounds; soft; non-distended; non-tender; no guarding/ rebound.  EXT: Normal ROM. No clubbing, cyanosis or edema. 2+ pulses to b/l ue/le.  NEURO: Alert, oriented, grossly unremarkable  PSYCH: Cooperative, mood and affect appropriate.

## 2020-03-07 NOTE — ED ADULT NURSE NOTE - CHPI ED NUR SYMPTOMS NEG
no congestion/no chest pain/no dizziness/no fever/no back pain/no chills/no diaphoresis/no shortness of breath/no nausea/no syncope/no vomiting

## 2020-03-09 ENCOUNTER — EMERGENCY (EMERGENCY)
Facility: HOSPITAL | Age: 85
LOS: 1 days | Discharge: HOME | End: 2020-03-09
Attending: EMERGENCY MEDICINE | Admitting: EMERGENCY MEDICINE
Payer: MEDICARE

## 2020-03-09 ENCOUNTER — APPOINTMENT (OUTPATIENT)
Dept: NEUROLOGY | Facility: CLINIC | Age: 85
End: 2020-03-09

## 2020-03-09 VITALS
HEART RATE: 62 BPM | HEIGHT: 65 IN | RESPIRATION RATE: 16 BRPM | TEMPERATURE: 99 F | OXYGEN SATURATION: 95 % | SYSTOLIC BLOOD PRESSURE: 141 MMHG | DIASTOLIC BLOOD PRESSURE: 78 MMHG

## 2020-03-09 VITALS
DIASTOLIC BLOOD PRESSURE: 77 MMHG | HEART RATE: 68 BPM | SYSTOLIC BLOOD PRESSURE: 146 MMHG | RESPIRATION RATE: 17 BRPM | OXYGEN SATURATION: 97 %

## 2020-03-09 DIAGNOSIS — Z79.02 LONG TERM (CURRENT) USE OF ANTITHROMBOTICS/ANTIPLATELETS: ICD-10-CM

## 2020-03-09 DIAGNOSIS — Z88.8 ALLERGY STATUS TO OTHER DRUGS, MEDICAMENTS AND BIOLOGICAL SUBSTANCES STATUS: ICD-10-CM

## 2020-03-09 DIAGNOSIS — K21.9 GASTRO-ESOPHAGEAL REFLUX DISEASE WITHOUT ESOPHAGITIS: ICD-10-CM

## 2020-03-09 DIAGNOSIS — R42 DIZZINESS AND GIDDINESS: ICD-10-CM

## 2020-03-09 DIAGNOSIS — Z79.899 OTHER LONG TERM (CURRENT) DRUG THERAPY: ICD-10-CM

## 2020-03-09 DIAGNOSIS — Z86.711 PERSONAL HISTORY OF PULMONARY EMBOLISM: ICD-10-CM

## 2020-03-09 DIAGNOSIS — Z95.0 PRESENCE OF CARDIAC PACEMAKER: Chronic | ICD-10-CM

## 2020-03-09 DIAGNOSIS — Z79.82 LONG TERM (CURRENT) USE OF ASPIRIN: ICD-10-CM

## 2020-03-09 DIAGNOSIS — Z91.041 RADIOGRAPHIC DYE ALLERGY STATUS: ICD-10-CM

## 2020-03-09 DIAGNOSIS — I25.10 ATHEROSCLEROTIC HEART DISEASE OF NATIVE CORONARY ARTERY WITHOUT ANGINA PECTORIS: ICD-10-CM

## 2020-03-09 DIAGNOSIS — R41.82 ALTERED MENTAL STATUS, UNSPECIFIED: ICD-10-CM

## 2020-03-09 DIAGNOSIS — Z98.890 OTHER SPECIFIED POSTPROCEDURAL STATES: ICD-10-CM

## 2020-03-09 DIAGNOSIS — J45.909 UNSPECIFIED ASTHMA, UNCOMPLICATED: ICD-10-CM

## 2020-03-09 DIAGNOSIS — I16.0 HYPERTENSIVE URGENCY: ICD-10-CM

## 2020-03-09 DIAGNOSIS — R55 SYNCOPE AND COLLAPSE: ICD-10-CM

## 2020-03-09 DIAGNOSIS — Z95.0 PRESENCE OF CARDIAC PACEMAKER: ICD-10-CM

## 2020-03-09 DIAGNOSIS — I10 ESSENTIAL (PRIMARY) HYPERTENSION: ICD-10-CM

## 2020-03-09 DIAGNOSIS — D64.9 ANEMIA, UNSPECIFIED: ICD-10-CM

## 2020-03-09 DIAGNOSIS — H81.10 BENIGN PAROXYSMAL VERTIGO, UNSPECIFIED EAR: ICD-10-CM

## 2020-03-09 DIAGNOSIS — Z95.5 PRESENCE OF CORONARY ANGIOPLASTY IMPLANT AND GRAFT: ICD-10-CM

## 2020-03-09 PROCEDURE — 93010 ELECTROCARDIOGRAM REPORT: CPT

## 2020-03-09 PROCEDURE — 99284 EMERGENCY DEPT VISIT MOD MDM: CPT | Mod: 25

## 2020-03-09 PROCEDURE — 93005 ELECTROCARDIOGRAM TRACING: CPT

## 2020-03-09 PROCEDURE — 99284 EMERGENCY DEPT VISIT MOD MDM: CPT

## 2020-03-09 RX ORDER — ACETAMINOPHEN 500 MG
650 TABLET ORAL ONCE
Refills: 0 | Status: COMPLETED | OUTPATIENT
Start: 2020-03-09 | End: 2020-03-09

## 2020-03-09 RX ADMIN — Medication 650 MILLIGRAM(S): at 16:07

## 2020-03-09 NOTE — ED PROVIDER NOTE - CLINICAL SUMMARY MEDICAL DECISION MAKING FREE TEXT BOX
3 y/o F with PMHx HTN, pace maker, CAD s/p stent and GERD sent in from her PMD Dr. Mckeon' office for pre-syncope and AMS. Pt was recently admitted for labile blood pressured, d/c'ed and was following up today at Dr. Mckeon' office. Pt currently endorsing mild headache, stating that she is convinced that her new BP medication is making her feel this way. Pt refusing bloodwork or CT scan. Will have EP interrogate pacemaker to evaluate for any cardiac event. Plan discussed with PCP, pt agreeable, and will f/u with Dr. Mckeon. Tylenol given for headache. 3 y/o F with PMHx HTN, pace maker, CAD s/p stent and GERD sent in from her PMD Dr. Mckeon' office for pre-syncope and AMS. Pt was recently admitted for labile blood pressure and dizziness, d/c'ed and was following up today at Dr. Mckeon' office. Pt currently endorsing mild headache, stating that she is convinced that her new BP medication is making her feel this way. Pt refusing bloodwork or CT scan. EP consulted and in ED to see pt. Pacemaker interrogated by EP with no acute findings. Plan discussed with PCP, pt agreeable, and will f/u with Dr. Mckeon. Tylenol given for headache. Return precautions given.

## 2020-03-09 NOTE — ED ADULT NURSE NOTE - OBJECTIVE STATEMENT
89y F, A&ox3, Hx of HTN, HLD, vertigo, PPM, presents to ed from PCP office for elevated BP. PT reports seen and evaluated recently in hospital for similar s/s. Reports "when I take my blood pressure meds I get headaches" PT reports about 15min after med administration of home bp meds feels occipital lobe headaches. No active headaches on arrival, no cp, no sob, no n/v, no numbness nor tingling. PT went to PCP office today for follow up and noted elevated bp at office and was sent in for further evaluation. No active complaints at this time. Family at bedside.

## 2020-03-09 NOTE — ED ADULT NURSE REASSESSMENT NOTE - NS ED NURSE REASSESS COMMENT FT1
Dr. Veloz ordered labs and CT, pt refused, labs cancelled/ CT cancelled by Dr. Veloz, pt agrees to have pacemaker interrogated.

## 2020-03-09 NOTE — ED PROVIDER NOTE - OBJECTIVE STATEMENT
89 y/o F with PMHx HTN, pace maker, CAD s/p stent and GERD sent in from her PMD Dr. Mckeon' office for pre-syncope and AMS. Pt was recently admitted for labile blood pressured, d/c'ed and was following up today at Dr. Mckeon' office. Pt stated that she wasn't feeling well while being evaluated and per family member who was there at bedside as well as Dr. Miller, pt went "in and out" repeatedly. Denies any LOC, chest pain, focal weaknesses or numbness. 89 y/o F with PMHx HTN, pacemaker, CAD s/p stent and GERD sent in from her PMD Dr. Mckeon' office for pre-syncope and AMS. Pt was recently admitted for labile blood pressures, dizziness d/c'ed and was following up today at Dr. Mckeon' office. Pt stated that she wasn't feeling well while being evaluated and per family member who was there at bedside as well as Dr. Miller, pt went "in and out" repeatedly. Denies any LOC, chest pain, SOB, HA, fevers, chills, focal weaknesses or numbness. 89 y/o F with PMHx HTN, pacemaker, CAD s/p stent and GERD sent in from her PMD Dr. Mckeon' office for pre-syncope and AMS. Pt was recently admitted for labile blood pressures, dizziness d/c'ed and was following up today at Dr. Mckeon' office. Pt stated that she wasn't feeling well while being evaluated and per family member who was there at bedside as well as Dr. Miller, pt went "in and out" repeatedly. Denies any LOC, chest pain, SOB, fevers, chills, focal weaknesses or numbness.

## 2020-03-09 NOTE — ED ADULT TRIAGE NOTE - OTHER COMPLAINTS
pt seen here on friday for htn, c.o elevated bp with ha. takes lisinopril. also reports generalized weakness. pt seen here on friday for htn, c.o elevated bp with ha. takes lisinopril. also reports generalized weakness. fs 86

## 2020-03-09 NOTE — ED PROVIDER NOTE - QUALITY
Physician Discharge Summary     Patient ID:  Bakari Dasilva  [id number]  76 y.o.  1948    Admit date: 2/15/2017    Discharge date and time: 2/18/17    Admitting Physician: Medhat Acosta MD     Primary Cardiologist:Dr. Jhon Mcginnis    Primary Care MD:Chen Tracy MD    Discharge Physician: Hilda Gil NP    Admission Diagnoses: CHF  CHF (congestive heart failure) [facility])    Discharge Diagnoses:   Patient Active Problem List    Diagnosis Date Noted    CHF (congestive heart failure) ([facility] [facility] 02/17/2017    Routine general medical examination at a [facility] care facility 12/16/2016    Chronic left-sided low back pain without sciatica 06/15/2016    Pulmonary hypertension ([address] 06/15/2016    H/O prosthetic aortic valve replacement     Cardiomyopathy ([facility] [address]     Osteopenia     HLD (hyperlipidemia)     Osteoarthritis     ICD (implantable cardioverter-defibrillator) in place 10/02/2014    Diabetes mellitus type 2, diet-controlled ([facility] [address] 08/28/2014    COPD (chronic obstructive pulmonary disease) ([facility] [address] 04/02/2014    Chronic respiratory failure ([facility] [address] 04/02/2014    REJI (obstructive sleep apnea)-cpap 04/02/2014    CKD (chronic kidney disease) stage 3, GFR 30-59 ml/min 07/10/2013    Anticoagulated on Coumadin 07/09/2013    CAD (coronary artery disease) 01/20/2013    Chronic combined systolic and diastolic heart failure ([facility] [address] 01/20/2013    Essential hypertension, benign 01/20/2013    MDS (myelodysplastic syndrome) ([facility] [address] 12/17/2011    Iron deficiency anemia due to chronic blood loss 07/29/2009           Hospital Course: Bakari Dasilva was admitted to the hospital on 2/15/2017 with shortness of breath and volume overload. She has known history of CAD, ischemic cardiomyopathy, chronic systolic heart failure. Single chamber ICD in place. She was admitted to hospital for further treatment. She was placed on IV diuretics with improvement of her clinical course. Her coreg and lisinopril was increased.  She has history of chronic respiratory failure, copd. She is chronically on oxygen and Trilogy --her Trilogy was adjusted with improvement of her oxygenation. She was felt to be acceptable for discharge home today with close follow up in one week with labs. Discharge Exam:     Visit Vitals    /78 (BP 1 Location: Right arm, BP Patient Position: Hip tilt, left)    Pulse 69    Temp 97.9 °F (36.6 °C)    Resp 18    Ht 5' 2\" (1.575 m)    Wt 101.5 kg (223 lb 12.8 oz)    SpO2 96%    BMI 40.93 kg/m2     General Appearance:  Well developed, well nourished,alert and oriented x 3, and individual in no acute distress. Ears/Nose/Mouth/Throat:   Hearing grossly normal.         Neck: Supple. Chest:   Lungs clear to auscultation bilaterally. Cardiovascular:  Regular rate and rhythm, S1, S2 normal, no murmur. Abdomen:   Soft, non-tender, bowel sounds are active. Extremities: No edema bilaterally. Skin: Warm and dry.                Final Laboratory Data:  Recent Results (from the past 24 hour(s))   PROTHROMBIN TIME + INR    Collection Time: 02/18/17  3:50 AM   Result Value Ref Range    Prothrombin time 22.3 (H) 9.6 - 12.0 sec    INR 2.0 (H) 0.9 - 1.2     METABOLIC PANEL, BASIC    Collection Time: 02/18/17  3:50 AM   Result Value Ref Range    Sodium 143 136 - 145 mmol/L    Potassium 4.2 3.5 - 5.1 mmol/L    Chloride 94 (L) 98 - 107 mmol/L    CO2 40 (H) 21 - 32 mmol/L    Anion gap 9 7 - 16 mmol/L    Glucose 99 65 - 100 mg/dL    BUN 29 (H) 8 - 23 MG/DL    Creatinine 1.19 (H) 0.6 - 1.0 MG/DL    GFR est AA 58 (L) >60 ml/min/1.73m2    GFR est non-AA 48 (L) >60 ml/min/1.73m2    Calcium 9.3 8.3 - 10.4 MG/DL   MAGNESIUM    Collection Time: 02/18/17  3:50 AM   Result Value Ref Range    Magnesium 2.1 1.8 - 2.4 mg/dL       Disposition: home    Patient Instructions:   Current Discharge Medication List      START taking these medications    Details   ciprofloxacin HCl (CILOXIN) 0.3 % ophthalmic solution Administer 1 Drop to both eyes every four (4) hours. Qty: 30 mL, Refills: 0      spironolactone (ALDACTONE) 25 mg tablet Take 1 Tab by mouth daily. Qty: 30 Tab, Refills: 6         CONTINUE these medications which have CHANGED    Details   carvedilol (COREG) 12.5 mg tablet Take 1 Tab by mouth two (2) times daily (with meals). Indications: hypertension  Qty: 60 Tab, Refills: 6      lisinopril (PRINIVIL, ZESTRIL) 10 mg tablet Take 1 Tab by mouth two (2) times a day. Indications: hypertension  Qty: 60 Tab, Refills: 6      furosemide (LASIX) 40 mg tablet Take 2 Tabs by mouth two (2) times a day. Qty: 120 Tab, Refills: 6         CONTINUE these medications which have NOT CHANGED    Details   warfarin (COUMADIN) 5 mg tablet TAKE 1 TABLET BY MOUTH AT BEDTIME  Qty: 90 Tab, Refills: 0      isosorbide mononitrate ER (IMDUR) 30 mg tablet TAKE 1 TAB BY MOUTH EVERY MORNING. Qty: 30 Tab, Refills: 11      calcium citrate 200 mg (950 mg) tablet TAKE 1 TAB BY MOUTH TWO (2) TIMES A DAY. Qty: 180 Tab, Refills: 1    Associated Diagnoses: Iron deficiency anemia due to chronic blood loss      traMADol (ULTRAM) 50 mg tablet Take 1 Tab by mouth every four (4) hours as needed for Pain. Max Daily Amount: 300 mg. Indications: PAIN  Qty: 150 Tab, Refills: 5    Comments: Not to exceed 5 additional fills before 03/16/2016  Associated Diagnoses: Osteoarthritis of shoulder, unspecified laterality, unspecified osteoarthritis type      escitalopram oxalate (LEXAPRO) 10 mg tablet Take 1 Tab by mouth daily. Indications: ANXIETY WITH DEPRESSION  Qty: 90 Tab, Refills: 3      ferrous gluconate 324 mg (38 mg iron) tablet TAKE 1 TABLET BY MOUTH ONCE DAILY  Qty: 90 Tab, Refills: 0      simvastatin (ZOCOR) 20 mg tablet TAKE 1 TABLET EVERY DAY  Qty: 90 Tab, Refills: 0      mometasone-formoterol (DULERA) 200-5 mcg/actuation HFA inhaler 2 puffs bid, rinse mouth after use.   Qty: 1 Inhaler, Refills: 11    Associated Diagnoses: Pulmonary hypertension ([address] Chronic respiratory failure with hypoxia (HCC)      cholecalciferol, VITAMIN D3, (VITAMIN D3) 5,000 unit tab tablet Take 1 Tab by mouth daily. Qty: 90 Tab, Refills: 4      tiotropium (SPIRIVA WITH HANDIHALER) 18 mcg inhalation capsule Take 1 Cap by inhalation daily. Indications: PREVENTION OF BRONCHOSPASMS WITH EMPHYSEMA  Qty: 90 Cap, Refills: 4      fluticasone (FLONASE) 50 mcg/actuation nasal spray 2 Sprays by Both Nostrils route daily as needed. Refills: 10      albuterol (PROVENTIL VENTOLIN) 2.5 mg /3 mL (0.083 %) nebulizer solution 3 mL by Nebulization route every four (4) hours as needed for Wheezing. Qty: 24 Each, Refills: 11    Associated Diagnoses: Pulmonary hypertension ([address] Chronic respiratory failure with hypoxia (HCC)      albuterol (VENTOLIN HFA) 90 mcg/actuation inhaler 2 puffs qid prn  Qty: 1 Inhaler, Refills: 11    Associated Diagnoses: Pulmonary hypertension ([address] Chronic respiratory failure with hypoxia (HCC)      acetaminophen (TYLENOL) 325 mg tablet Take 650 mg by mouth every four (4) hours as needed for Pain. OXYGEN-AIR DELIVERY SYSTEMS 3 L by Nasal route. nitroglycerin (NITROSTAT) 0.4 mg SL tablet 0.4 mg by SubLINGual route every five (5) minutes as needed. aspirin delayed-release (ASPIR-81) 81 mg tablet Take 81 mg by mouth every morning. Referenced discharge instructions provided by nursing for diet and activity. Follow-up:  Primary Cardiologist:Dr. Bartolo Rey  in 1 week  PCP: Mayank West MD) in about 4 weeks.     Signed:  Gera Ceballos NP  2/18/2017  9:26 AM altered level of consciousness

## 2020-03-09 NOTE — ED ADULT NURSE NOTE - CHPI ED NUR SYMPTOMS NEG
no chills/no pain/no weakness/no vomiting/no dizziness/no fever/no nausea/no decreased eating/drinking/no tingling

## 2020-03-09 NOTE — PROCEDURE NOTE - ADDITIONAL PROCEDURE DETAILS
Pt syncopized at MD office. Prodrome of dizziness, no c/p, palps, sob. Device check normal. No arrhythmias. Pt PPM dependent.

## 2020-03-09 NOTE — ED PROVIDER NOTE - NEUROLOGICAL, MLM
Alert and oriented, no focal deficits, no motor or sensory deficits. Pt ambulating with her cane at baseline. Alert and oriented, no focal deficits, no motor or sensory deficits. Pt ambulating with her cane ( baseline).

## 2020-03-09 NOTE — ED PROVIDER NOTE - PROGRESS NOTE DETAILS
Spoke with Dr. Mckeon; pt had BP of 130/90 while in office today, HR in 70's. EP consulted and in ED to see pt. Pacemaker interrogated by EP with no acute findings.

## 2020-03-09 NOTE — ED PROVIDER NOTE - PATIENT PORTAL LINK FT
You can access the FollowMyHealth Patient Portal offered by [facility] by registering at the following website: [email or web address]. By joining [employer]’s FollowMyHealth portal, you will also be able to view your health information using other applications (apps) compatible with our system.

## 2020-03-09 NOTE — ED PROVIDER NOTE - NSFOLLOWUPINSTRUCTIONS_ED_ALL_ED_FT
Please follow up with your primary care doctor in 2-3 days.     Dizziness    Dizziness can manifest as a feeling of unsteadiness or light-headedness. You may feel like you are about to faint. This condition can be caused by a number of things, including medicines, dehydration, or illness. Drink enough fluid to keep your urine clear or pale yellow. Do not drink alcohol and limit your caffeine intake. Avoid quick or sudden movements.  Rise slowly from chairs and steady yourself until you feel okay. In the morning, first sit up on the side of the bed.    SEEK IMMEDIATE MEDICAL CARE IF YOU HAVE ANY OF THE FOLLOWING SYMPTOMS: vomiting, changes in your vision or speech, weakness in your arms or legs, trouble speaking or swallowing, chest pain, abdominal pain, shortness of breath, sweating, bleeding, headache, neck pain, or fever.

## 2020-03-11 DIAGNOSIS — Z79.82 LONG TERM (CURRENT) USE OF ASPIRIN: ICD-10-CM

## 2020-03-11 DIAGNOSIS — I10 ESSENTIAL (PRIMARY) HYPERTENSION: ICD-10-CM

## 2020-03-11 DIAGNOSIS — Z79.899 OTHER LONG TERM (CURRENT) DRUG THERAPY: ICD-10-CM

## 2020-03-11 DIAGNOSIS — R51 HEADACHE: ICD-10-CM

## 2020-03-11 DIAGNOSIS — Z88.8 ALLERGY STATUS TO OTHER DRUGS, MEDICAMENTS AND BIOLOGICAL SUBSTANCES STATUS: ICD-10-CM

## 2020-03-17 ENCOUNTER — INPATIENT (INPATIENT)
Facility: HOSPITAL | Age: 85
LOS: 2 days | Discharge: HOME HEALTH | DRG: 194 | End: 2020-03-20
Attending: STUDENT IN AN ORGANIZED HEALTH CARE EDUCATION/TRAINING PROGRAM | Admitting: STUDENT IN AN ORGANIZED HEALTH CARE EDUCATION/TRAINING PROGRAM
Payer: MEDICARE

## 2020-03-17 VITALS
DIASTOLIC BLOOD PRESSURE: 76 MMHG | HEIGHT: 65 IN | RESPIRATION RATE: 17 BRPM | HEART RATE: 67 BPM | OXYGEN SATURATION: 96 % | TEMPERATURE: 100 F | SYSTOLIC BLOOD PRESSURE: 151 MMHG

## 2020-03-17 DIAGNOSIS — Z87.19 PERSONAL HISTORY OF OTHER DISEASES OF THE DIGESTIVE SYSTEM: ICD-10-CM

## 2020-03-17 DIAGNOSIS — J12.89 OTHER VIRAL PNEUMONIA: ICD-10-CM

## 2020-03-17 DIAGNOSIS — K52.9 NONINFECTIVE GASTROENTERITIS AND COLITIS, UNSPECIFIED: ICD-10-CM

## 2020-03-17 DIAGNOSIS — K21.9 GASTRO-ESOPHAGEAL REFLUX DISEASE WITHOUT ESOPHAGITIS: ICD-10-CM

## 2020-03-17 DIAGNOSIS — Z86.711 PERSONAL HISTORY OF PULMONARY EMBOLISM: ICD-10-CM

## 2020-03-17 DIAGNOSIS — Z91.89 OTHER SPECIFIED PERSONAL RISK FACTORS, NOT ELSEWHERE CLASSIFIED: ICD-10-CM

## 2020-03-17 DIAGNOSIS — J18.9 PNEUMONIA, UNSPECIFIED ORGANISM: ICD-10-CM

## 2020-03-17 DIAGNOSIS — R63.8 OTHER SYMPTOMS AND SIGNS CONCERNING FOOD AND FLUID INTAKE: ICD-10-CM

## 2020-03-17 DIAGNOSIS — Z95.0 PRESENCE OF CARDIAC PACEMAKER: ICD-10-CM

## 2020-03-17 DIAGNOSIS — R53.1 WEAKNESS: ICD-10-CM

## 2020-03-17 DIAGNOSIS — I10 ESSENTIAL (PRIMARY) HYPERTENSION: ICD-10-CM

## 2020-03-17 DIAGNOSIS — Z95.0 PRESENCE OF CARDIAC PACEMAKER: Chronic | ICD-10-CM

## 2020-03-17 DIAGNOSIS — Z90.710 ACQUIRED ABSENCE OF BOTH CERVIX AND UTERUS: ICD-10-CM

## 2020-03-17 DIAGNOSIS — B97.29 OTHER CORONAVIRUS AS THE CAUSE OF DISEASES CLASSIFIED ELSEWHERE: ICD-10-CM

## 2020-03-17 DIAGNOSIS — Z91.041 RADIOGRAPHIC DYE ALLERGY STATUS: ICD-10-CM

## 2020-03-17 DIAGNOSIS — Z66 DO NOT RESUSCITATE: ICD-10-CM

## 2020-03-17 DIAGNOSIS — I25.10 ATHEROSCLEROTIC HEART DISEASE OF NATIVE CORONARY ARTERY WITHOUT ANGINA PECTORIS: ICD-10-CM

## 2020-03-17 DIAGNOSIS — E87.1 HYPO-OSMOLALITY AND HYPONATREMIA: ICD-10-CM

## 2020-03-17 DIAGNOSIS — Z79.82 LONG TERM (CURRENT) USE OF ASPIRIN: ICD-10-CM

## 2020-03-17 PROBLEM — I63.9 CEREBRAL INFARCTION: Status: ACTIVE | Noted: 2020-03-17

## 2020-03-17 LAB
ALBUMIN SERPL ELPH-MCNC: 3.3 G/DL — SIGNIFICANT CHANGE UP (ref 3.3–5)
ALP SERPL-CCNC: 61 U/L — SIGNIFICANT CHANGE UP (ref 40–120)
ALT FLD-CCNC: 10 U/L — SIGNIFICANT CHANGE UP (ref 10–45)
ANION GAP SERPL CALC-SCNC: 10 MMOL/L — SIGNIFICANT CHANGE UP (ref 5–17)
ANION GAP SERPL CALC-SCNC: 8 MMOL/L — SIGNIFICANT CHANGE UP (ref 5–17)
APPEARANCE UR: CLEAR — SIGNIFICANT CHANGE UP
AST SERPL-CCNC: 23 U/L — SIGNIFICANT CHANGE UP (ref 10–40)
BASOPHILS # BLD AUTO: 0 K/UL — SIGNIFICANT CHANGE UP (ref 0–0.2)
BASOPHILS NFR BLD AUTO: 0 % — SIGNIFICANT CHANGE UP (ref 0–2)
BILIRUB SERPL-MCNC: 0.2 MG/DL — SIGNIFICANT CHANGE UP (ref 0.2–1.2)
BILIRUB UR-MCNC: NEGATIVE — SIGNIFICANT CHANGE UP
BUN SERPL-MCNC: 9 MG/DL — SIGNIFICANT CHANGE UP (ref 7–23)
BUN SERPL-MCNC: 9 MG/DL — SIGNIFICANT CHANGE UP (ref 7–23)
CALCIUM SERPL-MCNC: 8.6 MG/DL — SIGNIFICANT CHANGE UP (ref 8.4–10.5)
CALCIUM SERPL-MCNC: 8.7 MG/DL — SIGNIFICANT CHANGE UP (ref 8.4–10.5)
CHLORIDE SERPL-SCNC: 94 MMOL/L — LOW (ref 96–108)
CHLORIDE SERPL-SCNC: 96 MMOL/L — SIGNIFICANT CHANGE UP (ref 96–108)
CO2 SERPL-SCNC: 25 MMOL/L — SIGNIFICANT CHANGE UP (ref 22–31)
CO2 SERPL-SCNC: 26 MMOL/L — SIGNIFICANT CHANGE UP (ref 22–31)
COLOR SPEC: YELLOW — SIGNIFICANT CHANGE UP
CREAT SERPL-MCNC: 0.81 MG/DL — SIGNIFICANT CHANGE UP (ref 0.5–1.3)
CREAT SERPL-MCNC: 0.88 MG/DL — SIGNIFICANT CHANGE UP (ref 0.5–1.3)
DIFF PNL FLD: NEGATIVE — SIGNIFICANT CHANGE UP
EOSINOPHIL # BLD AUTO: 0 K/UL — SIGNIFICANT CHANGE UP (ref 0–0.5)
EOSINOPHIL NFR BLD AUTO: 0 % — SIGNIFICANT CHANGE UP (ref 0–6)
GLUCOSE SERPL-MCNC: 91 MG/DL — SIGNIFICANT CHANGE UP (ref 70–99)
GLUCOSE SERPL-MCNC: 98 MG/DL — SIGNIFICANT CHANGE UP (ref 70–99)
GLUCOSE UR QL: NEGATIVE — SIGNIFICANT CHANGE UP
HCT VFR BLD CALC: 31.2 % — LOW (ref 34.5–45)
HGB BLD-MCNC: 10.8 G/DL — LOW (ref 11.5–15.5)
IMM GRANULOCYTES NFR BLD AUTO: 0.6 % — SIGNIFICANT CHANGE UP (ref 0–1.5)
KETONES UR-MCNC: NEGATIVE — SIGNIFICANT CHANGE UP
LACTATE SERPL-SCNC: 1 MMOL/L — SIGNIFICANT CHANGE UP (ref 0.5–2)
LEUKOCYTE ESTERASE UR-ACNC: ABNORMAL
LIDOCAIN IGE QN: 33 U/L — SIGNIFICANT CHANGE UP (ref 7–60)
LYMPHOCYTES # BLD AUTO: 1.13 K/UL — SIGNIFICANT CHANGE UP (ref 1–3.3)
LYMPHOCYTES # BLD AUTO: 23.8 % — SIGNIFICANT CHANGE UP (ref 13–44)
MCHC RBC-ENTMCNC: 24.8 PG — LOW (ref 27–34)
MCHC RBC-ENTMCNC: 34.6 GM/DL — SIGNIFICANT CHANGE UP (ref 32–36)
MCV RBC AUTO: 71.6 FL — LOW (ref 80–100)
MONOCYTES # BLD AUTO: 0.17 K/UL — SIGNIFICANT CHANGE UP (ref 0–0.9)
MONOCYTES NFR BLD AUTO: 3.6 % — SIGNIFICANT CHANGE UP (ref 2–14)
NEUTROPHILS # BLD AUTO: 3.41 K/UL — SIGNIFICANT CHANGE UP (ref 1.8–7.4)
NEUTROPHILS NFR BLD AUTO: 72 % — SIGNIFICANT CHANGE UP (ref 43–77)
NITRITE UR-MCNC: NEGATIVE — SIGNIFICANT CHANGE UP
NRBC # BLD: 0 /100 WBCS — SIGNIFICANT CHANGE UP (ref 0–0)
PH UR: 7 — SIGNIFICANT CHANGE UP (ref 5–8)
PLATELET # BLD AUTO: 176 K/UL — SIGNIFICANT CHANGE UP (ref 150–400)
POTASSIUM SERPL-MCNC: 4.3 MMOL/L — SIGNIFICANT CHANGE UP (ref 3.5–5.3)
POTASSIUM SERPL-MCNC: 4.7 MMOL/L — SIGNIFICANT CHANGE UP (ref 3.5–5.3)
POTASSIUM SERPL-SCNC: 4.3 MMOL/L — SIGNIFICANT CHANGE UP (ref 3.5–5.3)
POTASSIUM SERPL-SCNC: 4.7 MMOL/L — SIGNIFICANT CHANGE UP (ref 3.5–5.3)
PROT SERPL-MCNC: 6.8 G/DL — SIGNIFICANT CHANGE UP (ref 6–8.3)
PROT UR-MCNC: NEGATIVE MG/DL — SIGNIFICANT CHANGE UP
RAPID RVP RESULT: SIGNIFICANT CHANGE UP
RBC # BLD: 4.36 M/UL — SIGNIFICANT CHANGE UP (ref 3.8–5.2)
RBC # FLD: 16.5 % — HIGH (ref 10.3–14.5)
SODIUM SERPL-SCNC: 128 MMOL/L — LOW (ref 135–145)
SODIUM SERPL-SCNC: 131 MMOL/L — LOW (ref 135–145)
SP GR SPEC: 1.01 — SIGNIFICANT CHANGE UP (ref 1–1.03)
UROBILINOGEN FLD QL: 0.2 E.U./DL — SIGNIFICANT CHANGE UP
WBC # BLD: 4.74 K/UL — SIGNIFICANT CHANGE UP (ref 3.8–10.5)
WBC # FLD AUTO: 4.74 K/UL — SIGNIFICANT CHANGE UP (ref 3.8–10.5)

## 2020-03-17 PROCEDURE — 93010 ELECTROCARDIOGRAM REPORT: CPT

## 2020-03-17 PROCEDURE — 74176 CT ABD & PELVIS W/O CONTRAST: CPT | Mod: 26

## 2020-03-17 PROCEDURE — 99223 1ST HOSP IP/OBS HIGH 75: CPT | Mod: GC,AI

## 2020-03-17 PROCEDURE — 71250 CT THORAX DX C-: CPT | Mod: 26

## 2020-03-17 PROCEDURE — 99285 EMERGENCY DEPT VISIT HI MDM: CPT

## 2020-03-17 RX ORDER — SODIUM CHLORIDE 9 MG/ML
1000 INJECTION INTRAMUSCULAR; INTRAVENOUS; SUBCUTANEOUS ONCE
Refills: 0 | Status: COMPLETED | OUTPATIENT
Start: 2020-03-17 | End: 2020-03-17

## 2020-03-17 RX ORDER — PANTOPRAZOLE SODIUM 20 MG/1
40 TABLET, DELAYED RELEASE ORAL
Refills: 0 | Status: DISCONTINUED | OUTPATIENT
Start: 2020-03-17 | End: 2020-03-20

## 2020-03-17 RX ORDER — PIPERACILLIN AND TAZOBACTAM 4; .5 G/20ML; G/20ML
3.38 INJECTION, POWDER, LYOPHILIZED, FOR SOLUTION INTRAVENOUS ONCE
Refills: 0 | Status: DISCONTINUED | OUTPATIENT
Start: 2020-03-17 | End: 2020-03-17

## 2020-03-17 RX ORDER — METOPROLOL TARTRATE 50 MG
50 TABLET ORAL DAILY
Refills: 0 | Status: DISCONTINUED | OUTPATIENT
Start: 2020-03-18 | End: 2020-03-20

## 2020-03-17 RX ORDER — AMLODIPINE BESYLATE 2.5 MG/1
10 TABLET ORAL EVERY 24 HOURS
Refills: 0 | Status: DISCONTINUED | OUTPATIENT
Start: 2020-03-18 | End: 2020-03-20

## 2020-03-17 RX ORDER — ACETAMINOPHEN 500 MG
650 TABLET ORAL ONCE
Refills: 0 | Status: COMPLETED | OUTPATIENT
Start: 2020-03-17 | End: 2020-03-17

## 2020-03-17 RX ORDER — CLOPIDOGREL BISULFATE 75 MG/1
75 TABLET, FILM COATED ORAL DAILY
Refills: 0 | Status: DISCONTINUED | OUTPATIENT
Start: 2020-03-18 | End: 2020-03-20

## 2020-03-17 RX ORDER — ASPIRIN/CALCIUM CARB/MAGNESIUM 324 MG
81 TABLET ORAL EVERY 24 HOURS
Refills: 0 | Status: DISCONTINUED | OUTPATIENT
Start: 2020-03-18 | End: 2020-03-20

## 2020-03-17 RX ORDER — IOHEXOL 300 MG/ML
30 INJECTION, SOLUTION INTRAVENOUS ONCE
Refills: 0 | Status: COMPLETED | OUTPATIENT
Start: 2020-03-17 | End: 2020-03-17

## 2020-03-17 RX ORDER — ONDANSETRON 8 MG/1
4 TABLET, FILM COATED ORAL ONCE
Refills: 0 | Status: COMPLETED | OUTPATIENT
Start: 2020-03-17 | End: 2020-03-17

## 2020-03-17 RX ORDER — ENOXAPARIN SODIUM 100 MG/ML
30 INJECTION SUBCUTANEOUS EVERY 24 HOURS
Refills: 0 | Status: DISCONTINUED | OUTPATIENT
Start: 2020-03-17 | End: 2020-03-20

## 2020-03-17 RX ORDER — PIPERACILLIN AND TAZOBACTAM 4; .5 G/20ML; G/20ML
3.38 INJECTION, POWDER, LYOPHILIZED, FOR SOLUTION INTRAVENOUS ONCE
Refills: 0 | Status: COMPLETED | OUTPATIENT
Start: 2020-03-17 | End: 2020-03-17

## 2020-03-17 RX ORDER — ATORVASTATIN CALCIUM 80 MG/1
80 TABLET, FILM COATED ORAL AT BEDTIME
Refills: 0 | Status: DISCONTINUED | OUTPATIENT
Start: 2020-03-17 | End: 2020-03-20

## 2020-03-17 RX ADMIN — IOHEXOL 30 MILLILITER(S): 300 INJECTION, SOLUTION INTRAVENOUS at 12:29

## 2020-03-17 RX ADMIN — PANTOPRAZOLE SODIUM 40 MILLIGRAM(S): 20 TABLET, DELAYED RELEASE ORAL at 22:03

## 2020-03-17 RX ADMIN — PIPERACILLIN AND TAZOBACTAM 200 GRAM(S): 4; .5 INJECTION, POWDER, LYOPHILIZED, FOR SOLUTION INTRAVENOUS at 16:18

## 2020-03-17 RX ADMIN — ATORVASTATIN CALCIUM 80 MILLIGRAM(S): 80 TABLET, FILM COATED ORAL at 22:03

## 2020-03-17 RX ADMIN — Medication 650 MILLIGRAM(S): at 12:30

## 2020-03-17 RX ADMIN — SODIUM CHLORIDE 1000 MILLILITER(S): 9 INJECTION INTRAMUSCULAR; INTRAVENOUS; SUBCUTANEOUS at 12:29

## 2020-03-17 RX ADMIN — ENOXAPARIN SODIUM 40 MILLIGRAM(S): 100 INJECTION SUBCUTANEOUS at 22:03

## 2020-03-17 RX ADMIN — Medication 650 MILLIGRAM(S): at 17:06

## 2020-03-17 RX ADMIN — ONDANSETRON 4 MILLIGRAM(S): 8 TABLET, FILM COATED ORAL at 12:30

## 2020-03-17 NOTE — H&P ADULT - PROBLEM SELECTOR PLAN 3
- f/u LDH, ferritin, crp  - no need to send legionella for now pt on protonix at home  -cw pantoprazole

## 2020-03-17 NOTE — ASSESSMENT
[FreeTextEntry1] : "89F w PMH HTN, CAD s/p stent, PPM, GERD, hx of hemorrhoids vertigo, who presents for 1 day of acutely worsening dizziness, orthostatic negative, hospital course c/b poorly controlled pressures with symptoms described as "hotflashes". Neuro consulted for dizziness, which appears chronic, reproducible with head tilting, similar in description to BPPV however with no improvement w/ outpt vestibular rehab and symptoms of hearing loss, making menierres more likely. No clear relationship to high BPs, however may correlate.  MRI showing no acute infarct, chronic microangiopathic diease, and age appropriate volume loss greatest in parietal lobe, similar in description to BPPV however with no improvement w/ outpt vestibular rehab and symptoms of hearing loss, making menierres more likely."

## 2020-03-17 NOTE — H&P ADULT - PROBLEM SELECTOR PLAN 4
- hold lisinopril in setting of coronavirus r/o CT with patchy bilateral GGO and pneumonitis.  - f/u covid studies as below

## 2020-03-17 NOTE — ED ADULT TRIAGE NOTE - OTHER COMPLAINTS
pt c.o abd pain, nausea and generalized weakness x several days. denies cough/sob. pt febrile in triage.

## 2020-03-17 NOTE — COUNSELING
[Fall prevention counseling provided] : Fall prevention counseling provided [Adequate lighting] : Adequate lighting [No throw rugs] : No throw rugs [Use proper foot wear] : Use proper foot wear [Use recommended devices] : Use recommended devices [Behavioral health counseling provided] : Behavioral health counseling provided [Sleep ___ hours/day] : Sleep [unfilled] hours/day [Engage in a relaxing activity] : Engage in a relaxing activity [Plan in advance] : Plan in advance [None] : None [Good understanding] : Patient has a good understanding of lifestyle changes and steps needed to achieve self management goal

## 2020-03-17 NOTE — PLAN
[FreeTextEntry1] : CV and pulmonary status stable\par Patient will continue with anticoagulation regimen as directed. \par Patient advised to continue with deep breathe 10x/hr. \par Medication education discussed in full detail with + teach back. \par Safety precautions discussed including use of DME’s for ambulating and ADLs as applicable. \par Patient to follow up with PCP/Neurologist as directed.  \par Patient educated on s/s of CVA with + teach back. \par Reminded patient about program, role of care navigator, clinical call center, yellow card with contact information.  Advised to call with any questions/concerns, verbalized understanding.\par \par \par \par

## 2020-03-17 NOTE — H&P ADULT - PROBLEM SELECTOR PROBLEM 7
Transition of care performed with sharing of clinical summary Nutrition, metabolism, and development symptoms Essential hypertension

## 2020-03-17 NOTE — ED PROVIDER NOTE - CLINICAL SUMMARY MEDICAL DECISION MAKING FREE TEXT BOX
89F PMH HTN, CAD, GERD, PPM p/w weakness. Pt states she has several weeks of generalized weakness, decreased PO intake, nausea, upper abd pain. Pt was in ED 3/9 for lightheaded, 3/7 for HA and then admitted in february twice for dizziness. No other systemic symptoms. Oral temp 100.3, other vitals wnl. Exam as above.  ddx: Unclear etiology. Possible infecitous/nocosomial infection.   cbc, cmp, lactate, lipase, blood cx. CT chest a/p. IVF, symptom control, reassess.

## 2020-03-17 NOTE — H&P ADULT - PROBLEM SELECTOR PROBLEM 6
Nutrition, metabolism, and development symptoms Essential hypertension CAD (coronary artery disease)

## 2020-03-17 NOTE — H&P ADULT - ATTENDING COMMENTS
Pt. seen and examined by me earlier today; I have read Dr. Siddiqui's H&P, I agree w/ his findings and plan of care as documented; r/o COVID-19

## 2020-03-17 NOTE — ED PROVIDER NOTE - OBJECTIVE STATEMENT
89F PMH HTN, CAD, GERD, PPM p/w weakness. Pt states she has several weeks of generalized weakness, decreased PO intake, nausea, upper abd pain. Pt was in ED 3/9 for lightheaded, 3/7 for HA and then admitted in february twice for dizziness. Denies f/c, HA, rhinorrhea, cough, SOB/CP, diarrhea, urinary complaints, black/bloody stool. Lives herself, ambulates w/ cane.

## 2020-03-17 NOTE — H&P ADULT - NSHPSOCIALHISTORY_GEN_ALL_CORE
Lives at home alone, walks with cane at baseline. Lives at home, says she has a young family member that stays with her to help out (grandson's friend?), walks with cane at baseline.  Denies smoking, alcohol, recreational drug use.

## 2020-03-17 NOTE — H&P ADULT - NSHPPHYSICALEXAM_GEN_ALL_CORE
Vital Signs Last 12 Hrs  T(F): 98.8 (03-17-20 @ 16:53), Max: 101.7 (03-17-20 @ 12:30)  HR: 70 (03-17-20 @ 16:53) (67 - 70)  BP: 126/70 (03-17-20 @ 16:53) (126/70 - 151/76)  BP(mean): --  RR: 16 (03-17-20 @ 16:53) (16 - 17)  SpO2: 96% (03-17-20 @ 16:53) (96% - 96%)  I&O's Summary      PHYSICAL EXAM:  Constitutional: NAD, comfortable in bed.  HEENT: NC/AT, PERRLA, EOMI, no conjunctival pallor or scleral icterus, MMM  Neck: Supple, no JVD  Respiratory: Normal rate, rhythm, depth, effort. CTAB. No w/r/r.   Cardiovascular: RRR, normal S1 and S2, no m/r/g.   Gastrointestinal: +BS, soft NTND, no guarding or rebound tenderness, no palpable masses   Extremities: wwp; no cyanosis, clubbing or edema.   Vascular: Pulses equal and strong throughout.   Neurological: AAOx3, no CN deficits, strength and sensation intact throughout.   Skin: No gross skin abnormalities or rashes Vital Signs Last 12 Hrs  T(F): 98.8 (03-17-20 @ 16:53), Max: 101.7 (03-17-20 @ 12:30)  HR: 70 (03-17-20 @ 16:53) (67 - 70)  BP: 126/70 (03-17-20 @ 16:53) (126/70 - 151/76)  BP(mean): --  RR: 16 (03-17-20 @ 16:53) (16 - 17)  SpO2: 96% (03-17-20 @ 16:53) (96% - 96%)  I&O's Summary      PHYSICAL EXAM:  Constitutional: NAD, comfortable in bed, speaking softly in full sentences, on RA  HEENT: NC/AT, EOMI, no conjunctival pallor or scleral icterus, MMM  Neck: Supple  Respiratory: Normal rate, rhythm, depth, effort. CTAB. No w/r/r.   Cardiovascular: RRR, normal S1 and S2, no m/r/g.   Gastrointestinal: soft NTND, no guarding or rebound tenderness, no palpable masses   Extremities: wwp; no cyanosis, clubbing or edema.   Vascular: Pulses equal and strong throughout.   Neurological: Alert, no CN deficits, strength and sensation intact throughout.   Skin: No gross skin abnormalities or rashes

## 2020-03-17 NOTE — H&P ADULT - ASSESSMENT
89F PMH HTN, CAD, GERD, PPM p/w weakness. Pt states she has several weeks of generalized weakness, decreased PO intake, nausea, upper abd pain.

## 2020-03-17 NOTE — ED PROVIDER NOTE - PROGRESS NOTE DETAILS
[place]: Mild hyponatremia, other labs grossly wnl. Pt well appearing. CT w/ likely colitis and pneumonitis. Clinically, possible COVID. Placed on contact/droplet precautions. Will cover colitis w/ abx. RVP/COVID testing to be sent off. Updated pt. Given age, colitis and overall worsening functional status, will admit for further care.

## 2020-03-17 NOTE — ED ADULT NURSE NOTE - NSIMPLEMENTINTERV_GEN_ALL_ED
Implemented All Universal Safety Interventions:  [place] to call system. Call bell, personal items and telephone within reach. Instruct patient to call for assistance. Room bathroom lighting operational. Non-slip footwear when patient is off stretcher. Physically safe environment: no spills, clutter or unnecessary equipment. Stretcher in lowest position, wheels locked, appropriate side rails in place.

## 2020-03-17 NOTE — H&P ADULT - PROBLEM SELECTOR PLAN 2
Mild colitis of the transverse colon, pt chief complaint of burning, endorse small episode of diarrhea but no white count. Some covid patient present with GI complaints. Pt with generalized weakness and s/p zosyn in ED. UA negative  - hold off on abx for now  - montior GI symptoms

## 2020-03-17 NOTE — H&P ADULT - PROBLEM SELECTOR PLAN 1
Generalized weakness liekly 2/2 decreased PO intake in setting of covid vs colitis. Hyponatremia cw decreased PO intake.  - Encourage PO  - IV fluids until tolerating PO, last echo 2/20/2020 with EF 60-65% Generalized weakness liekly 2/2 decreased PO intake in setting of covid vs colitis. Hyponatremia cw decreased PO intake.  - Encourage PO  - f/u PM bmp to monitor Na  - IV fluids prn until tolerating PO, last echo 2/20/2020 with EF 60-65%  - PT eval when covid ruled out or if rules changed

## 2020-03-17 NOTE — H&P ADULT - PROBLEM SELECTOR PLAN 5
- f/u RVP  - Covid sent  - f/u LDH, ferritin, crp  - no need to send legionella for now - f/u RVP  - Covid sent  - f/u LDH, ferritin, crp, urine legionella

## 2020-03-17 NOTE — ED PROVIDER NOTE - PHYSICAL EXAMINATION
no LE edema, normal equal distal pulses.  abd: BS+, soft, minimal b/l upper abd ttp, no rebound/guarding.

## 2020-03-17 NOTE — ED ADULT NURSE NOTE - CHPI ED NUR SYMPTOMS NEG
no dysuria/no hematuria/no blood in stool/no fever/no abdominal distension/no burning urination/no chills

## 2020-03-17 NOTE — ED ADULT NURSE NOTE - CHPI ED NUR TIMING2
Acute Neck Pain   AMBULATORY CARE:   Acute neck pain  starts suddenly, increases quickly, and goes away in a few days  The pain may come and go, or be worse with certain movements  The pain may be only in your neck, or it may move to your arms, back, or shoulders  You may also have pain that starts in another body area and moves to your neck  Seek care immediately if:   · You have an injury that causes neck pain and shooting pain down your arms or legs  · Your neck pain suddenly becomes severe  · You have neck pain along with numbness, tingling, or weakness in your arms or legs  · You have a stiff neck, a headache, and a fever  Contact your healthcare provider if:   · You have new or worsening symptoms  · Your symptoms continue even after treatment  · You have questions or concerns about your condition or care  Treatment  may include any of the following, depending on what is causing your pain:  · Medicines  may be prescribed or recommended by your healthcare provider for pain  You may need medicine to treat nerve pain or to stop muscle spasms  Medicines may also be given to reduce inflammation  Your healthcare provider may inject medicine into a nerve to block pain  Over-the-counter NSAID medicine or acetaminophen may be recommended to help treat minor pain or inflammation  · Traction  is used to relieve pressure from nerves  Your head is gently pulled up and away from your neck  This stretches muscles and ligaments and makes more room for the spine  Your healthcare provider will tell you the kind of traction that will help your neck pain  Do not use traction devices at home unless directed by your healthcare provider  Manage or prevent acute neck pain:   · Rest your neck as directed  Do not make sudden movements, such as turning your head quickly  Your healthcare provider may recommend you wear a cervical collar for a short time  The collar will prevent you from moving your head   This will give your neck time to heal if an injury is causing your neck pain  Ask your healthcare provider when you can return to sports or other normal daily activities  · Apply heat as directed  Heat helps relieve pain and swelling  Use a heat wrap, or soak a small towel in warm water  Wring out the extra water  Apply the heat wrap or towel for 20 minutes every hour, or as directed  · Apply ice as directed  Ice helps relieve pain and swelling, and can help prevent tissue damage  Use an ice pack, or put ice in a bag  Cover the ice pack or back with a towel before you apply it to your neck  Apply the ice pack or ice for 15 minutes every hour, or as directed  Your healthcare provider can tell you how often to apply ice  · Do neck exercises as directed  Neck exercises help strengthen the muscles and increase range of motion  Your healthcare provider will tell you which exercises are right for you  He may give you instructions, or he may recommend that you work with a physical therapist  Your healthcare provider or therapist can make sure you are doing the exercises correctly  · Maintain good posture  Try to keep your head and shoulders lifted when you sit  If you work in front of a computer, make sure the monitor is at the right level  You should not need to look up down to see the screen  You should also not have to lean forward to be able to read what is on the screen  Make sure your keyboard, mouse, and other computer items are placed where you do not have to extend your shoulder to reach them  Get up often if you work in front of a computer or sit for long periods of time  Stretch or walk around to keep your neck muscles loose  Follow up with your healthcare provider as directed: Your healthcare provider may refer you to a specialist if your pain does not get better with treatment  Write down your questions so you remember to ask them during your visits    © 2017 [facility] [employer]  Information is for End User's use only and may not be sold, redistributed or otherwise used for commercial purposes  All illustrations and images included in CareNotes® are the copyrighted property of A D A M , Inc  or [employer]  The above information is an  only  It is not intended as medical advice for individual conditions or treatments  Talk to your doctor, nurse or pharmacist before following any medical regimen to see if it is safe and effective for you  Paresthesia   WHAT YOU NEED TO KNOW:   Paresthesia is numbness and tingling  It can happen in any part of your body, but usually occurs in your legs, feet, arms, or hands  There are a large number of conditions that can cause paresthesia  It affects the nerves that provide sensation and happens when there are changes in nerves or nerve pathways  These changes can be temporary, such as if you take certain medicines or you are not getting enough vitamin B  Paresthesia can become permanent when there is nerve damage  Conditions that may cause nerve damage include diabetes, carpel tunnel syndrome, stroke, and multiple sclerosis  The exact cause of your paresthesia may be unknown  DISCHARGE INSTRUCTIONS:   Medicines:  Ask for more information about medicines you may need to treat the condition causing your paresthesias  · NSAIDs  help decrease swelling and pain or fever  This medicine is available with or without a doctor's order  NSAIDs can cause stomach bleeding or kidney problems in certain people  If you take blood thinner medicine, always ask your healthcare provider if NSAIDs are safe for you  Always read the medicine label and follow directions  · Take your medicine as directed  Contact your healthcare provider if you think your medicine is not helping or if you have side effects  Tell him or her if you are allergic to any medicine  Keep a list of the medicines, vitamins, and herbs you take   Include the amounts, and when and why you take them  Bring the list or the pill bottles to follow-up visits  Carry your medicine list with you in case of an emergency  Follow up with your healthcare provider or neurologist as directed:  Write down your questions so you remember to ask them during your visits  Contact your healthcare provider or neurologist if:   · Your symptoms do not improve  · You have symptoms in more than one part of your body  · You have questions about your condition or care  Return to the emergency department if:   · You have any of the following signs of a stroke:      ¨ Numbness or drooping on one side of your face     ¨ Weakness in an arm or leg    ¨ Confusion or difficulty speaking    ¨ Dizziness, a severe headache, or vision loss    · You are not able to control your urine or bowel movements  · You have severe pain along with numbness and tingling  Your legs suddenly become cold  You have trouble moving your legs, and they ache  · You have increased weakness in a part of your body  · You have uncontrolled movements, or you have a seizure  © 2017 [facility] Information is for End User's use only and may not be sold, redistributed or otherwise used for commercial purposes  All illustrations and images included in CareNotes® are the copyrighted property of A D A M , Inc  or [employer]  The above information is an  only  It is not intended as medical advice for individual conditions or treatments  Talk to your doctor, nurse or pharmacist before following any medical regimen to see if it is safe and effective for you  Cervicalgia:   -The patients cervical Xray showed straightening of the spine with left neural foraminal impingement of C3-C5  The patients symptoms are secondary to nerve impingement as she is experiencing paraesthesias  Will place the patient on a Medrol Dose Breonna   Take with meals    -Massage the neck and scapula/trap area, use warm compress and do light stretching   You can take Tylenol for the pain but avoid NSAIDS while on the steroid    -Follow up with Daryn Macias is advised as the patient will likely need further management and work up    -If your symptoms become acutely worse or if you experience chest pains, shortness of breath, dizziness, palpitations go immediately to the ED constant

## 2020-03-17 NOTE — H&P ADULT - NSHPLABSRESULTS_GEN_ALL_CORE
LABS:                        10.8   4.74  )-----------( 176      ( 17 Mar 2020 12:11 )             31.2     03-17    128<L>  |  94<L>  |  9   ----------------------------<  98  4.7   |  26  |  0.81    Ca    8.7      17 Mar 2020 12:11    TPro  6.8  /  Alb  3.3  /  TBili  0.2  /  DBili  x   /  AST  23  /  ALT  10  /  AlkPhos  61  03-17      Urinalysis Basic - ( 17 Mar 2020 15:34 )    Color: Yellow / Appearance: Clear / S.010 / pH: x  Gluc: x / Ketone: NEGATIVE  / Bili: Negative / Urobili: 0.2 E.U./dL   Blood: x / Protein: NEGATIVE mg/dL / Nitrite: NEGATIVE   Leuk Esterase: Trace / RBC: < 5 /HPF / WBC < 5 /HPF   Sq Epi: x / Non Sq Epi: 0-5 /HPF / Bacteria: Present /HPF    RADIOLOGY & ADDITIONAL TESTS:    MEDICATIONS  (STANDING):    MEDICATIONS  (PRN):

## 2020-03-17 NOTE — HISTORY OF PRESENT ILLNESS
[Post-hospitalization from ___ Hospital] : Post-hospitalization from [unfilled] Hospital [Admitted on: ___] : The patient was admitted on [unfilled] [Discharge Summary] : discharge summary [Pertinent Labs] : pertinent labs [Radiology Findings] : radiology findings [Discharge Med List] : discharge medication list [Med Reconciliation] : medication reconciliation has been completed [Patient Contacted By: ____] : and contacted by [unfilled] [FreeTextEntry2] : This patient is Enrolled in the STAR Program through [facility]\par Copied As per [facility] Discharge Summary\par "89F w PMH HTN, CAD s/p stent, PPM, GERD, hx of hemorrhoids vertigo, who presents for 1 day of acutely worsening dizziness, orthostatic negative, hospital course c/b poorly controlled pressures with symptoms described as "hotflashes". Neuro consulted for dizziness, which appears chronic, reproducible with head tilting, similar in description to BPPV however with no improvement w/ outpt vestibular rehab and symptoms of hearing loss, making menierres more likely. No clear relationship to high BPs, however may correlate.  MRI showing no acute infarct, chronic microangiopathic diease, and age appropriate volume loss greatest in parietal lobe, similar in description to BPPV however with no improvement w/ outpt vestibular rehab and symptoms of hearing loss, making menierres more likely."\par \par The patient was seen in her home accompanied by her friend.  The patient denies chest pain, shortness of breath, cough, hemoptysis, fever, palpitations, syncope.  She notes intermittent positional  dizziness and labile BPs.

## 2020-03-17 NOTE — PHYSICAL EXAM
[No Acute Distress] : no acute distress [Well Nourished] : well nourished [Well Developed] : well developed [EOMI] : extraocular movements intact [Normal Outer Ear/Nose] : the outer ears and nose were normal in appearance [No JVD] : no jugular venous distention [No Respiratory Distress] : no respiratory distress  [Clear to Auscultation] : lungs were clear to auscultation bilaterally [No Accessory Muscle Use] : no accessory muscle use [Normal Rate] : normal rate  [Regular Rhythm] : with a regular rhythm [Normal S1, S2] : normal S1 and S2 [No Edema] : there was no peripheral edema [Soft] : abdomen soft [Non Tender] : non-tender [Non-distended] : non-distended [No CVA Tenderness] : no CVA  tenderness [No Joint Swelling] : no joint swelling [No Rash] : no rash [No Focal Deficits] : no focal deficits [Normal Affect] : the affect was normal

## 2020-03-17 NOTE — H&P ADULT - HISTORY OF PRESENT ILLNESS
89F PMH HTN, CAD, GERD, PPM p/w weakness. Pt states she has several weeks of generalized weakness, decreased PO intake, nausea, upper abd pain. Pt was in ED 3/9 for lightheadednedd, 3/7 for HA and then admitted in february twice for dizziness. On prior admission meds switched from amlodipine to lisinopril for concerns of etiology of weakness/dizziness and MRI CT imaging negative for etiology of LE weakness. Denies f/c, HA, rhinorrhea, cough, SOB/CP, diarrhea, urinary complaints, black/bloody stool. Lives herself, ambulates w/ cane.    ED course: T 100.3F --> 101.7F, HR 67, /76, RR 17 96 o2% on RA. CT scan with colitis and multifocal pneumonitis, mild bilateral patchy peripheral groundglass opacities predominantly along right hilum > left. Pt given zosyn for colitis. WBC 4.74. No leuko or lymphopenia. Na 128. Creatinin 0.83 (at baseline), no LFT elevations. Lactate 1.0 89F PMH HTN, CAD, GERD, PPM p/w weakness. Pt states she has several weeks of generalized weakness, decreased PO intake, nausea, upper abd pain. Pt was in ED 3/9 for lightheadedness, 3/7 for HA and then admitted in february twice for dizziness. On prior admission meds switched from amlodipine to lisinopril for concerns of etiology of weakness/dizziness and MRI CT imaging negative for etiology of LE weakness. Denies f/c, HA, rhinorrhea, cough, SOB/CP, diarrhea, urinary complaints, black/bloody stool. Lives herself, ambulates w/ cane.    ED course: T 100.3F --> 101.7F, HR 67, /76, RR 17 96 o2% on RA. CT scan with colitis and multifocal pneumonitis, mild bilateral patchy peripheral groundglass opacities predominantly along right hilum > left. Pt given zosyn for colitis. WBC 4.74. No leuko or lymphopenia. Na 128. Creatinin 0.83 (at baseline), no LFT elevations. Lactate 1.0. UA negative. 89F PMH HTN, CAD, GERD, PPM p/w weakness. Pt states she has several weeks of generalized weakness, decreased PO intake, nausea, upper abd pain. Pt was in ED 3/9 for lightheadedness, 3/7 for HA and then admitted in february twice for dizziness. On prior admission meds switched from amlodipine to lisinopril for concerns of etiology of weakness/dizziness and MRI CT imaging negative for etiology of LE weakness. Over past few days patient has felt worsening of a "burning" in her stomach, worsens with food, worse at night. Pt has had the burning for months but feels it is worse now. Has been eating less as a result. Pain is better when she sits up, does not change with exertion although pt says she is walking around less and less because of generalized weakeness. Denies chest pain, sob, cough, Endorses one small episode of fomay diarrhea yesterday. Denies f/c, HA, rhinorrhea, urinary complaints, black/bloody stool.    ED course: T 100.3F --> 101.7F, HR 67, /76, RR 17 96 o2% on RA. CT scan with colitis and multifocal pneumonitis, mild bilateral patchy peripheral groundglass opacities predominantly along right hilum > left. WBC 4.74. No leuko or lymphopenia. Na 128. Creatinin 0.83 (at baseline), no LFT elevations. Lactate 1.0. UA negative. Pt given zosyn for colitis. Got 1L NS, tylenol, zofran. 89F PMH HTN, CAD, GERD, PPM p/w weakness. Pt states she has several weeks of generalized weakness, decreased PO intake, nausea, upper abd pain. Pt was in ED 3/9 for lightheadedness, 3/7 for HA and then admitted in february twice for dizziness. On prior admission meds switched from amlodipine to lisinopril for concerns of etiology of weakness/dizziness and MRI CT imaging negative for etiology of LE weakness. Over past few days patient has felt worsening of a "burning" in her stomach, worsens with food, worse at night. Pt has had the burning for months but feels it is worse now. Has been eating less as a result. Pain is better when she sits up, does not change with exertion although pt says she is walking around less and less because of generalized weakeness. Denies chest pain, sob, cough, Endorses one small episode of foamy diarrhea yesterday. Denies f/c, HA, rhinorrhea, urinary complaints, black/bloody stool.    ED course: T 100.3F --> 101.7F, HR 67, /76, RR 17 96 o2% on RA. CT scan with colitis and multifocal pneumonitis, mild bilateral patchy peripheral groundglass opacities predominantly along right hilum > left. WBC 4.74. No leuko or lymphopenia. Na 128. Creatinin 0.83 (at baseline), no LFT elevations. Lactate 1.0. UA negative. Pt given zosyn for colitis. Got 1L NS, tylenol, zofran.

## 2020-03-17 NOTE — ED ADULT NURSE NOTE - OBJECTIVE STATEMENT
Patient alert and oriented x 3 came BIBA for generalized weakness , dizziness  , abdominal pain , nausea for2 weeks and diarrhea yesterday . No chest pain , sob , palpitations , dysuria ,  fever nor chills .  Seen and examined by Dr. Kumar . Stated been taking pepto bismo with no relief .  Not in distress , will continue to monitor .

## 2020-03-17 NOTE — H&P ADULT - PROBLEM SELECTOR PLAN 7
F:  E: replete prn  N: regular  ppx  DVT: lovenox 40 - hold lisinopril in setting of coronavirus r/o  - pt was switched from amlodipine to lisinopril for possible etiology of weakness on past admission, still with ongoing chronic symptoms despite change. Will switch back to norvasc given covid r/o

## 2020-03-18 DIAGNOSIS — E87.1 HYPO-OSMOLALITY AND HYPONATREMIA: ICD-10-CM

## 2020-03-18 LAB
ANION GAP SERPL CALC-SCNC: 12 MMOL/L — SIGNIFICANT CHANGE UP (ref 5–17)
BASOPHILS # BLD AUTO: 0.01 K/UL — SIGNIFICANT CHANGE UP (ref 0–0.2)
BASOPHILS NFR BLD AUTO: 0.2 % — SIGNIFICANT CHANGE UP (ref 0–2)
BUN SERPL-MCNC: 8 MG/DL — SIGNIFICANT CHANGE UP (ref 7–23)
CALCIUM SERPL-MCNC: 8.4 MG/DL — SIGNIFICANT CHANGE UP (ref 8.4–10.5)
CHLORIDE SERPL-SCNC: 99 MMOL/L — SIGNIFICANT CHANGE UP (ref 96–108)
CK MB CFR SERPL CALC: <1 NG/ML — SIGNIFICANT CHANGE UP (ref 0–6.7)
CK SERPL-CCNC: 69 U/L — SIGNIFICANT CHANGE UP (ref 25–170)
CO2 SERPL-SCNC: 23 MMOL/L — SIGNIFICANT CHANGE UP (ref 22–31)
CREAT SERPL-MCNC: 0.8 MG/DL — SIGNIFICANT CHANGE UP (ref 0.5–1.3)
CRP SERPL-MCNC: 4.95 MG/DL — HIGH (ref 0–0.4)
EOSINOPHIL # BLD AUTO: 0 K/UL — SIGNIFICANT CHANGE UP (ref 0–0.5)
EOSINOPHIL NFR BLD AUTO: 0 % — SIGNIFICANT CHANGE UP (ref 0–6)
FERRITIN SERPL-MCNC: 154 NG/ML — HIGH (ref 15–150)
GLUCOSE SERPL-MCNC: 81 MG/DL — SIGNIFICANT CHANGE UP (ref 70–99)
HCT VFR BLD CALC: 30.3 % — LOW (ref 34.5–45)
HGB BLD-MCNC: 10 G/DL — LOW (ref 11.5–15.5)
IMM GRANULOCYTES NFR BLD AUTO: 0.2 % — SIGNIFICANT CHANGE UP (ref 0–1.5)
LDH SERPL L TO P-CCNC: 200 U/L — SIGNIFICANT CHANGE UP (ref 50–242)
LYMPHOCYTES # BLD AUTO: 1.01 K/UL — SIGNIFICANT CHANGE UP (ref 1–3.3)
LYMPHOCYTES # BLD AUTO: 18.5 % — SIGNIFICANT CHANGE UP (ref 13–44)
MAGNESIUM SERPL-MCNC: 2.2 MG/DL — SIGNIFICANT CHANGE UP (ref 1.6–2.6)
MCHC RBC-ENTMCNC: 23.9 PG — LOW (ref 27–34)
MCHC RBC-ENTMCNC: 33 GM/DL — SIGNIFICANT CHANGE UP (ref 32–36)
MCV RBC AUTO: 72.5 FL — LOW (ref 80–100)
MONOCYTES # BLD AUTO: 0.18 K/UL — SIGNIFICANT CHANGE UP (ref 0–0.9)
MONOCYTES NFR BLD AUTO: 3.3 % — SIGNIFICANT CHANGE UP (ref 2–14)
NEUTROPHILS # BLD AUTO: 4.24 K/UL — SIGNIFICANT CHANGE UP (ref 1.8–7.4)
NEUTROPHILS NFR BLD AUTO: 77.8 % — HIGH (ref 43–77)
NRBC # BLD: 0 /100 WBCS — SIGNIFICANT CHANGE UP (ref 0–0)
PLATELET # BLD AUTO: 154 K/UL — SIGNIFICANT CHANGE UP (ref 150–400)
POTASSIUM SERPL-MCNC: 4 MMOL/L — SIGNIFICANT CHANGE UP (ref 3.5–5.3)
POTASSIUM SERPL-SCNC: 4 MMOL/L — SIGNIFICANT CHANGE UP (ref 3.5–5.3)
RBC # BLD: 4.18 M/UL — SIGNIFICANT CHANGE UP (ref 3.8–5.2)
RBC # FLD: 16.7 % — HIGH (ref 10.3–14.5)
SODIUM SERPL-SCNC: 134 MMOL/L — LOW (ref 135–145)
TROPONIN T SERPL-MCNC: <0.01 NG/ML — SIGNIFICANT CHANGE UP (ref 0–0.01)
WBC # BLD: 5.45 K/UL — SIGNIFICANT CHANGE UP (ref 3.8–10.5)
WBC # FLD AUTO: 5.45 K/UL — SIGNIFICANT CHANGE UP (ref 3.8–10.5)

## 2020-03-18 PROCEDURE — 99233 SBSQ HOSP IP/OBS HIGH 50: CPT | Mod: GC

## 2020-03-18 PROCEDURE — 93010 ELECTROCARDIOGRAM REPORT: CPT

## 2020-03-18 RX ORDER — POLYETHYLENE GLYCOL 3350 17 G/17G
17 POWDER, FOR SOLUTION ORAL ONCE
Refills: 0 | Status: COMPLETED | OUTPATIENT
Start: 2020-03-18 | End: 2020-03-18

## 2020-03-18 RX ORDER — SODIUM CHLORIDE 9 MG/ML
500 INJECTION INTRAMUSCULAR; INTRAVENOUS; SUBCUTANEOUS ONCE
Refills: 0 | Status: COMPLETED | OUTPATIENT
Start: 2020-03-18 | End: 2020-03-18

## 2020-03-18 RX ADMIN — Medication 30 MILLILITER(S): at 18:11

## 2020-03-18 RX ADMIN — SODIUM CHLORIDE 166.67 MILLILITER(S): 9 INJECTION INTRAMUSCULAR; INTRAVENOUS; SUBCUTANEOUS at 01:17

## 2020-03-18 RX ADMIN — Medication 10 MILLIGRAM(S): at 18:12

## 2020-03-18 RX ADMIN — Medication 100 MILLIGRAM(S): at 10:24

## 2020-03-18 RX ADMIN — Medication 30 MILLILITER(S): at 10:23

## 2020-03-18 RX ADMIN — ATORVASTATIN CALCIUM 80 MILLIGRAM(S): 80 TABLET, FILM COATED ORAL at 21:32

## 2020-03-18 RX ADMIN — Medication 81 MILLIGRAM(S): at 10:23

## 2020-03-18 RX ADMIN — POLYETHYLENE GLYCOL 3350 17 GRAM(S): 17 POWDER, FOR SOLUTION ORAL at 22:34

## 2020-03-18 RX ADMIN — ENOXAPARIN SODIUM 30 MILLIGRAM(S): 100 INJECTION SUBCUTANEOUS at 21:33

## 2020-03-18 RX ADMIN — AMLODIPINE BESYLATE 10 MILLIGRAM(S): 2.5 TABLET ORAL at 10:23

## 2020-03-18 RX ADMIN — CLOPIDOGREL BISULFATE 75 MILLIGRAM(S): 75 TABLET, FILM COATED ORAL at 10:23

## 2020-03-18 RX ADMIN — PANTOPRAZOLE SODIUM 40 MILLIGRAM(S): 20 TABLET, DELAYED RELEASE ORAL at 06:10

## 2020-03-18 NOTE — PROGRESS NOTE ADULT - PROBLEM SELECTOR PLAN 7
- hold lisinopril in setting of coronavirus r/o  - pt was switched from amlodipine to lisinopril for possible etiology of weakness on past admission, still with ongoing chronic symptoms despite change. Will switch back to norvasc given covid r/o

## 2020-03-18 NOTE — PROGRESS NOTE ADULT - ATTENDING COMMENTS
concern for GERD, refractory to medications  no ekg on file would like to r/o cardiac cause  will check EKG, troponin, ckmb     continue PPI and maalox

## 2020-03-18 NOTE — PROGRESS NOTE ADULT - PROBLEM SELECTOR PLAN 3
CT abdomen in ED showing mild inflammation of transverse colon -- patient with long standing history of GERD-like symptoms.   - Given lack of fevers, lack of WBC, will not treat at this time.   - Possibly 2/2 to COVID -- f/u results.

## 2020-03-18 NOTE — PROGRESS NOTE ADULT - PROBLEM SELECTOR PLAN 2
On presentation Na @ 128 with clinical picture c/w decreased PO as likely cause -- now improving s/p 1.5L NS  - Na @ 134 today.   - c/w qd bmps.

## 2020-03-18 NOTE — PROGRESS NOTE ADULT - SUBJECTIVE AND OBJECTIVE BOX
Interval / Overnight:    Subjective:     VITAL SIGNS:  Vital Signs Last 24 Hrs  T(C): 37.6 (18 Mar 2020 14:31), Max: 37.9 (18 Mar 2020 05:52)  T(F): 99.7 (18 Mar 2020 14:31), Max: 100.2 (18 Mar 2020 05:52)  HR: 82 (18 Mar 2020 14:31) (70 - 82)  BP: 125/63 (18 Mar 2020 14:31) (125/63 - 157/83)  BP(mean): --  RR: 18 (18 Mar 2020 14:31) (16 - 18)  SpO2: 96% (18 Mar 2020 14:31) (96% - 100%)    PHYSICAL EXAM:    General: in mild distress holding stomach, otherwise in no acute distress   HEENT: conjugate gaze, no scleral icterus, MMM  Neck: supple, no thyromegaly, no lymphadenopathy  Cardiovascular: +S1/S2, RRR, no M/G/R  Respiratory: Breathing comfortably, no accessory muscle use; mild inspiratory rales   Gastrointestinal: soft, NT/ND; hyperactive bowl sounds x4   Extremities: WWP; good pulses  Neurological: Alert to person place and date     MEDICATIONS:  MEDICATIONS  (STANDING):  amLODIPine   Tablet 10 milliGRAM(s) Oral every 24 hours  aspirin  chewable 81 milliGRAM(s) Oral every 24 hours  atorvastatin 80 milliGRAM(s) Oral at bedtime  clopidogrel Tablet 75 milliGRAM(s) Oral daily  enoxaparin Injectable 30 milliGRAM(s) SubCutaneous every 24 hours  metoprolol succinate ER 50 milliGRAM(s) Oral daily  pantoprazole    Tablet 40 milliGRAM(s) Oral before breakfast    MEDICATIONS  (PRN):  aluminum hydroxide/magnesium hydroxide/simethicone Suspension 30 milliLiter(s) Oral every 6 hours PRN Dyspepsia  benzonatate 100 milliGRAM(s) Oral three times a day PRN Cough      ALLERGIES:  Allergies    iodinated radiocontrast agents (Anaphylaxis)    Intolerances        LABS:                        10.0   5.45  )-----------( 154      ( 18 Mar 2020 07:46 )             30.3     03-18    134<L>  |  99  |  8   ----------------------------<  81  4.0   |  23  |  0.80    Ca    8.4      18 Mar 2020 07:46  Mg     2.2     -    TPro  6.8  /  Alb  3.3  /  TBili  0.2  /  DBili  x   /  AST  23  /  ALT  10  /  AlkPhos  61  -      Urinalysis Basic - ( 17 Mar 2020 15:34 )    Color: Yellow / Appearance: Clear / S.010 / pH: x  Gluc: x / Ketone: NEGATIVE  / Bili: Negative / Urobili: 0.2 E.U./dL   Blood: x / Protein: NEGATIVE mg/dL / Nitrite: NEGATIVE   Leuk Esterase: Trace / RBC: < 5 /HPF / WBC < 5 /HPF   Sq Epi: x / Non Sq Epi: 0-5 /HPF / Bacteria: Present /HPF      CAPILLARY BLOOD GLUCOSE          RADIOLOGY & ADDITIONAL TESTS: Reviewed. Interval / Overnight: DANNY, Na @ 131. 500cc Bolus NS.     Subjective: Ms. Garcia continues to have reflux-consistent pain with nausea. She denies SOB, fevers, chills, cp, n/v.     VITAL SIGNS:  Vital Signs Last 24 Hrs  T(C): 37.6 (18 Mar 2020 14:31), Max: 37.9 (18 Mar 2020 05:52)  T(F): 99.7 (18 Mar 2020 14:31), Max: 100.2 (18 Mar 2020 05:52)  HR: 82 (18 Mar 2020 14:31) (70 - 82)  BP: 125/63 (18 Mar 2020 14:31) (125/63 - 157/83)  BP(mean): --  RR: 18 (18 Mar 2020 14:31) (16 - 18)  SpO2: 96% (18 Mar 2020 14:31) (96% - 100%)    PHYSICAL EXAM:    General: in mild distress holding stomach, otherwise in no acute distress   HEENT: conjugate gaze, no scleral icterus, MMM  Neck: supple, no thyromegaly, no lymphadenopathy  Cardiovascular: +S1/S2, RRR, no M/G/R  Respiratory: Breathing comfortably, no accessory muscle use; mild inspiratory rales   Gastrointestinal: soft, NT/ND; hyperactive bowl sounds x4   Extremities: WWP; good pulses  Neurological: Alert to person place and date     MEDICATIONS:  MEDICATIONS  (STANDING):  amLODIPine   Tablet 10 milliGRAM(s) Oral every 24 hours  aspirin  chewable 81 milliGRAM(s) Oral every 24 hours  atorvastatin 80 milliGRAM(s) Oral at bedtime  clopidogrel Tablet 75 milliGRAM(s) Oral daily  enoxaparin Injectable 30 milliGRAM(s) SubCutaneous every 24 hours  metoprolol succinate ER 50 milliGRAM(s) Oral daily  pantoprazole    Tablet 40 milliGRAM(s) Oral before breakfast    MEDICATIONS  (PRN):  aluminum hydroxide/magnesium hydroxide/simethicone Suspension 30 milliLiter(s) Oral every 6 hours PRN Dyspepsia  benzonatate 100 milliGRAM(s) Oral three times a day PRN Cough      ALLERGIES:  Allergies    iodinated radiocontrast agents (Anaphylaxis)    Intolerances        LABS:                        10.0   5.45  )-----------( 154      ( 18 Mar 2020 07:46 )             30.3     03-18    134<L>  |  99  |  8   ----------------------------<  81  4.0   |  23  |  0.80    Ca    8.4      18 Mar 2020 07:46  Mg     2.2     03-18    TPro  6.8  /  Alb  3.3  /  TBili  0.2  /  DBili  x   /  AST  23  /  ALT  10  /  AlkPhos  61  03-      Urinalysis Basic - ( 17 Mar 2020 15:34 )    Color: Yellow / Appearance: Clear / S.010 / pH: x  Gluc: x / Ketone: NEGATIVE  / Bili: Negative / Urobili: 0.2 E.U./dL   Blood: x / Protein: NEGATIVE mg/dL / Nitrite: NEGATIVE   Leuk Esterase: Trace / RBC: < 5 /HPF / WBC < 5 /HPF   Sq Epi: x / Non Sq Epi: 0-5 /HPF / Bacteria: Present /HPF      CAPILLARY BLOOD GLUCOSE          RADIOLOGY & ADDITIONAL TESTS: Reviewed.

## 2020-03-18 NOTE — PROGRESS NOTE ADULT - PROBLEM SELECTOR PLAN 1
Generalized weakness liekly 2/2 decreased PO intake in setting of covid vs colitis.   - Encourage PO  - Na improving s/p IVF, however patient still not tolerating PO and will c/w IV hydration.   - IV fluids prn until tolerating PO, last echo 2/20/2020 with EF 60-65%  - PT eval when covid ruled out or if rules changed  - f/u COVID PCR

## 2020-03-19 DIAGNOSIS — B34.2 CORONAVIRUS INFECTION, UNSPECIFIED: ICD-10-CM

## 2020-03-19 LAB
ANION GAP SERPL CALC-SCNC: 8 MMOL/L — SIGNIFICANT CHANGE UP (ref 5–17)
BASOPHILS # BLD AUTO: 0 K/UL — SIGNIFICANT CHANGE UP (ref 0–0.2)
BASOPHILS NFR BLD AUTO: 0 % — SIGNIFICANT CHANGE UP (ref 0–2)
BUN SERPL-MCNC: 7 MG/DL — SIGNIFICANT CHANGE UP (ref 7–23)
CALCIUM SERPL-MCNC: 8.5 MG/DL — SIGNIFICANT CHANGE UP (ref 8.4–10.5)
CHLORIDE SERPL-SCNC: 99 MMOL/L — SIGNIFICANT CHANGE UP (ref 96–108)
CO2 SERPL-SCNC: 27 MMOL/L — SIGNIFICANT CHANGE UP (ref 22–31)
CREAT SERPL-MCNC: 0.76 MG/DL — SIGNIFICANT CHANGE UP (ref 0.5–1.3)
EOSINOPHIL # BLD AUTO: 0 K/UL — SIGNIFICANT CHANGE UP (ref 0–0.5)
EOSINOPHIL NFR BLD AUTO: 0 % — SIGNIFICANT CHANGE UP (ref 0–6)
FERRITIN SERPL-MCNC: 182 NG/ML — HIGH (ref 15–150)
GLUCOSE SERPL-MCNC: 98 MG/DL — SIGNIFICANT CHANGE UP (ref 70–99)
HCT VFR BLD CALC: 28.3 % — LOW (ref 34.5–45)
HGB BLD-MCNC: 9.7 G/DL — LOW (ref 11.5–15.5)
LDH SERPL L TO P-CCNC: 167 U/L — SIGNIFICANT CHANGE UP (ref 50–242)
LYMPHOCYTES # BLD AUTO: 1.1 K/UL — SIGNIFICANT CHANGE UP (ref 1–3.3)
LYMPHOCYTES # BLD AUTO: 18.2 % — SIGNIFICANT CHANGE UP (ref 13–44)
MCHC RBC-ENTMCNC: 24.6 PG — LOW (ref 27–34)
MCHC RBC-ENTMCNC: 34.3 GM/DL — SIGNIFICANT CHANGE UP (ref 32–36)
MCV RBC AUTO: 71.6 FL — LOW (ref 80–100)
MONOCYTES # BLD AUTO: 0.21 K/UL — SIGNIFICANT CHANGE UP (ref 0–0.9)
MONOCYTES NFR BLD AUTO: 3.5 % — SIGNIFICANT CHANGE UP (ref 2–14)
NEUTROPHILS # BLD AUTO: 4.67 K/UL — SIGNIFICANT CHANGE UP (ref 1.8–7.4)
NEUTROPHILS NFR BLD AUTO: 77.4 % — HIGH (ref 43–77)
PLATELET # BLD AUTO: 178 K/UL — SIGNIFICANT CHANGE UP (ref 150–400)
POTASSIUM SERPL-MCNC: 4 MMOL/L — SIGNIFICANT CHANGE UP (ref 3.5–5.3)
POTASSIUM SERPL-SCNC: 4 MMOL/L — SIGNIFICANT CHANGE UP (ref 3.5–5.3)
RBC # BLD: 3.95 M/UL — SIGNIFICANT CHANGE UP (ref 3.8–5.2)
RBC # FLD: 16.4 % — HIGH (ref 10.3–14.5)
SARS-COV-2 RNA SPEC QL NAA+PROBE: DETECTED
SODIUM SERPL-SCNC: 134 MMOL/L — LOW (ref 135–145)
WBC # BLD: 6.04 K/UL — SIGNIFICANT CHANGE UP (ref 3.8–10.5)
WBC # FLD AUTO: 6.04 K/UL — SIGNIFICANT CHANGE UP (ref 3.8–10.5)

## 2020-03-19 PROCEDURE — 99233 SBSQ HOSP IP/OBS HIGH 50: CPT | Mod: GC

## 2020-03-19 RX ORDER — ACETAMINOPHEN 500 MG
650 TABLET ORAL ONCE
Refills: 0 | Status: COMPLETED | OUTPATIENT
Start: 2020-03-19 | End: 2020-03-19

## 2020-03-19 RX ADMIN — AMLODIPINE BESYLATE 10 MILLIGRAM(S): 2.5 TABLET ORAL at 09:43

## 2020-03-19 RX ADMIN — ATORVASTATIN CALCIUM 80 MILLIGRAM(S): 80 TABLET, FILM COATED ORAL at 21:11

## 2020-03-19 RX ADMIN — Medication 81 MILLIGRAM(S): at 09:43

## 2020-03-19 RX ADMIN — Medication 650 MILLIGRAM(S): at 11:58

## 2020-03-19 RX ADMIN — PANTOPRAZOLE SODIUM 40 MILLIGRAM(S): 20 TABLET, DELAYED RELEASE ORAL at 06:11

## 2020-03-19 RX ADMIN — Medication 50 MILLIGRAM(S): at 06:11

## 2020-03-19 RX ADMIN — Medication 30 MILLILITER(S): at 18:56

## 2020-03-19 RX ADMIN — Medication 650 MILLIGRAM(S): at 10:09

## 2020-03-19 RX ADMIN — Medication 30 MILLILITER(S): at 09:43

## 2020-03-19 RX ADMIN — CLOPIDOGREL BISULFATE 75 MILLIGRAM(S): 75 TABLET, FILM COATED ORAL at 10:11

## 2020-03-19 RX ADMIN — ENOXAPARIN SODIUM 30 MILLIGRAM(S): 100 INJECTION SUBCUTANEOUS at 21:11

## 2020-03-19 NOTE — PROGRESS NOTE ADULT - PROBLEM SELECTOR PLAN 1
Patient is currently asymptomatic (normal RR, o2 sat @ 95%) with CT imaging showing peripheral groundglass opacities and discoid atelectasis. Official read as multifocal pneumonitis. COVID positive.  - Encourage PO due to fatigue.   - Patients Na @ 134 (presented @ 128), responding to fluids and current DASH TLC diet.   - disposition now c/b son who is ill and can't watch patient at home. Will need repeat COVID test if going to SNF.

## 2020-03-19 NOTE — PROGRESS NOTE ADULT - ATTENDING COMMENTS
agree with the above assessment and plan.     patient with caretaker covid+ and cannot care for patient; will need to assess for SNF; will need to recheck covid 1 week from collection and once negative have PT evaluate for SNF.    continue management as documented above.

## 2020-03-19 NOTE — PROGRESS NOTE ADULT - SUBJECTIVE AND OBJECTIVE BOX
Interval / Overnight: DANNY     Subjective: Ms. Garcia tells me she is feeling better this morning. Her GERD-like symptoms are improving. She denies fevers, chills, sore throat, cough, chest pain, nausea or vomiting. She has one small bm yesterday.     VITAL SIGNS:  Vital Signs Last 24 Hrs  T(C): 36.6 (19 Mar 2020 09:25), Max: 37.6 (18 Mar 2020 14:31)  T(F): 97.9 (19 Mar 2020 09:25), Max: 99.7 (18 Mar 2020 14:31)  HR: 74 (19 Mar 2020 09:25) (74 - 83)  BP: 146/67 (19 Mar 2020 09:25) (125/63 - 147/66)  BP(mean): --  RR: 19 (19 Mar 2020 09:25) (18 - 19)  SpO2: 94% (19 Mar 2020 09:25) (92% - 96%)    PHYSICAL EXAM:    General: in NAD, lying comfortably in bed, non ill appearing   HEENT: normocephalic, atraumatic; PERRL, anicteric sclera; MMM, no pharyngeal exudates   Neck: supple, no thyromegaly, no lymphadenopathy  Cardiovascular: +S1/S2, RRR, no M/G/R  Respiratory: Breathing comfortably, no accessory muscle use; increased tactile fremitus on left side, BL basilar rales   Gastrointestinal: soft, NT/ND; +BSx4  Extremities: WWP; nonedematous legs   Vascular: pulses palpable 1+ (radial and DP)   Neurological: Alert to person place and time     MEDICATIONS:  MEDICATIONS  (STANDING):  amLODIPine   Tablet 10 milliGRAM(s) Oral every 24 hours  aspirin  chewable 81 milliGRAM(s) Oral every 24 hours  atorvastatin 80 milliGRAM(s) Oral at bedtime  clopidogrel Tablet 75 milliGRAM(s) Oral daily  enoxaparin Injectable 30 milliGRAM(s) SubCutaneous every 24 hours  metoprolol succinate ER 50 milliGRAM(s) Oral daily  pantoprazole    Tablet 40 milliGRAM(s) Oral before breakfast    MEDICATIONS  (PRN):  aluminum hydroxide/magnesium hydroxide/simethicone Suspension 30 milliLiter(s) Oral every 6 hours PRN Dyspepsia  benzonatate 100 milliGRAM(s) Oral three times a day PRN Cough      ALLERGIES:  Allergies    iodinated radiocontrast agents (Anaphylaxis)    Intolerances        LABS:                        9.7    6.04  )-----------( 178      ( 19 Mar 2020 07:37 )             28.3     03-19    134<L>  |  99  |  7   ----------------------------<  98  4.0   |  27  |  0.76    Ca    8.5      19 Mar 2020 07:37  Mg     2.2     03-18        Urinalysis Basic - ( 17 Mar 2020 15:34 )    Color: Yellow / Appearance: Clear / S.010 / pH: x  Gluc: x / Ketone: NEGATIVE  / Bili: Negative / Urobili: 0.2 E.U./dL   Blood: x / Protein: NEGATIVE mg/dL / Nitrite: NEGATIVE   Leuk Esterase: Trace / RBC: < 5 /HPF / WBC < 5 /HPF   Sq Epi: x / Non Sq Epi: 0-5 /HPF / Bacteria: Present /HPF      CAPILLARY BLOOD GLUCOSE          RADIOLOGY & ADDITIONAL TESTS: Reviewed.

## 2020-03-19 NOTE — PROGRESS NOTE ADULT - PROBLEM SELECTOR PLAN 3
CT abdomen in ED showing mild inflammation of transverse colon -- patient with long standing history of GERD-like symptoms.   - Given lack of fevers, lack of WBC, will not treat at this time.   - Possibly 2/2 to COVID   - PPI 40mg QD, MAALOX q6h PRN (seems to be helping as per patient)

## 2020-03-19 NOTE — PROGRESS NOTE ADULT - PROBLEM SELECTOR PLAN 7
- hold lisinopril in setting of coronavirus r/o  - pt was switched from amlodipine to lisinopril for possible etiology of weakness on past admission, still with ongoing chronic symptoms despite change. Now COVID positive, will continue on amlodipine.

## 2020-03-20 ENCOUNTER — TRANSCRIPTION ENCOUNTER (OUTPATIENT)
Age: 85
End: 2020-03-20

## 2020-03-20 VITALS
OXYGEN SATURATION: 95 % | SYSTOLIC BLOOD PRESSURE: 165 MMHG | DIASTOLIC BLOOD PRESSURE: 74 MMHG | TEMPERATURE: 97 F | RESPIRATION RATE: 17 BRPM | HEART RATE: 71 BPM

## 2020-03-20 LAB
ALBUMIN SERPL ELPH-MCNC: 3.1 G/DL — LOW (ref 3.3–5)
ALP SERPL-CCNC: 61 U/L — SIGNIFICANT CHANGE UP (ref 40–120)
ALT FLD-CCNC: 18 U/L — SIGNIFICANT CHANGE UP (ref 10–45)
ANION GAP SERPL CALC-SCNC: 9 MMOL/L — SIGNIFICANT CHANGE UP (ref 5–17)
AST SERPL-CCNC: 27 U/L — SIGNIFICANT CHANGE UP (ref 10–40)
BASOPHILS # BLD AUTO: 0.01 K/UL — SIGNIFICANT CHANGE UP (ref 0–0.2)
BASOPHILS NFR BLD AUTO: 0.2 % — SIGNIFICANT CHANGE UP (ref 0–2)
BILIRUB SERPL-MCNC: 0.3 MG/DL — SIGNIFICANT CHANGE UP (ref 0.2–1.2)
BUN SERPL-MCNC: 5 MG/DL — LOW (ref 7–23)
CALCIUM SERPL-MCNC: 9 MG/DL — SIGNIFICANT CHANGE UP (ref 8.4–10.5)
CHLORIDE SERPL-SCNC: 99 MMOL/L — SIGNIFICANT CHANGE UP (ref 96–108)
CO2 SERPL-SCNC: 29 MMOL/L — SIGNIFICANT CHANGE UP (ref 22–31)
CREAT SERPL-MCNC: 0.67 MG/DL — SIGNIFICANT CHANGE UP (ref 0.5–1.3)
EOSINOPHIL # BLD AUTO: 0.01 K/UL — SIGNIFICANT CHANGE UP (ref 0–0.5)
EOSINOPHIL NFR BLD AUTO: 0.2 % — SIGNIFICANT CHANGE UP (ref 0–6)
GLUCOSE SERPL-MCNC: 108 MG/DL — HIGH (ref 70–99)
HCT VFR BLD CALC: 32.8 % — LOW (ref 34.5–45)
HGB BLD-MCNC: 11 G/DL — LOW (ref 11.5–15.5)
IMM GRANULOCYTES NFR BLD AUTO: 0.9 % — SIGNIFICANT CHANGE UP (ref 0–1.5)
LYMPHOCYTES # BLD AUTO: 1.15 K/UL — SIGNIFICANT CHANGE UP (ref 1–3.3)
LYMPHOCYTES # BLD AUTO: 24.5 % — SIGNIFICANT CHANGE UP (ref 13–44)
MAGNESIUM SERPL-MCNC: 2.3 MG/DL — SIGNIFICANT CHANGE UP (ref 1.6–2.6)
MCHC RBC-ENTMCNC: 24.2 PG — LOW (ref 27–34)
MCHC RBC-ENTMCNC: 33.5 GM/DL — SIGNIFICANT CHANGE UP (ref 32–36)
MCV RBC AUTO: 72.1 FL — LOW (ref 80–100)
MONOCYTES # BLD AUTO: 0.19 K/UL — SIGNIFICANT CHANGE UP (ref 0–0.9)
MONOCYTES NFR BLD AUTO: 4.1 % — SIGNIFICANT CHANGE UP (ref 2–14)
NEUTROPHILS # BLD AUTO: 3.29 K/UL — SIGNIFICANT CHANGE UP (ref 1.8–7.4)
NEUTROPHILS NFR BLD AUTO: 70.1 % — SIGNIFICANT CHANGE UP (ref 43–77)
NRBC # BLD: 0 /100 WBCS — SIGNIFICANT CHANGE UP (ref 0–0)
PHOSPHATE SERPL-MCNC: 1.7 MG/DL — LOW (ref 2.5–4.5)
PLATELET # BLD AUTO: 225 K/UL — SIGNIFICANT CHANGE UP (ref 150–400)
POTASSIUM SERPL-MCNC: 4.1 MMOL/L — SIGNIFICANT CHANGE UP (ref 3.5–5.3)
POTASSIUM SERPL-SCNC: 4.1 MMOL/L — SIGNIFICANT CHANGE UP (ref 3.5–5.3)
PROCALCITONIN SERPL-MCNC: 0.04 NG/ML — SIGNIFICANT CHANGE UP (ref 0.02–0.1)
PROT SERPL-MCNC: 6.8 G/DL — SIGNIFICANT CHANGE UP (ref 6–8.3)
RBC # BLD: 4.55 M/UL — SIGNIFICANT CHANGE UP (ref 3.8–5.2)
RBC # FLD: 16.5 % — HIGH (ref 10.3–14.5)
SODIUM SERPL-SCNC: 137 MMOL/L — SIGNIFICANT CHANGE UP (ref 135–145)
WBC # BLD: 4.69 K/UL — SIGNIFICANT CHANGE UP (ref 3.8–10.5)
WBC # FLD AUTO: 4.69 K/UL — SIGNIFICANT CHANGE UP (ref 3.8–10.5)

## 2020-03-20 PROCEDURE — 93005 ELECTROCARDIOGRAM TRACING: CPT

## 2020-03-20 PROCEDURE — 87040 BLOOD CULTURE FOR BACTERIA: CPT

## 2020-03-20 PROCEDURE — 71250 CT THORAX DX C-: CPT

## 2020-03-20 PROCEDURE — 99239 HOSP IP/OBS DSCHRG MGMT >30: CPT

## 2020-03-20 PROCEDURE — 87633 RESP VIRUS 12-25 TARGETS: CPT

## 2020-03-20 PROCEDURE — 82553 CREATINE MB FRACTION: CPT

## 2020-03-20 PROCEDURE — 96374 THER/PROPH/DIAG INJ IV PUSH: CPT

## 2020-03-20 PROCEDURE — 84145 PROCALCITONIN (PCT): CPT

## 2020-03-20 PROCEDURE — 81001 URINALYSIS AUTO W/SCOPE: CPT

## 2020-03-20 PROCEDURE — 82550 ASSAY OF CK (CPK): CPT

## 2020-03-20 PROCEDURE — 83735 ASSAY OF MAGNESIUM: CPT

## 2020-03-20 PROCEDURE — 83615 LACTATE (LD) (LDH) ENZYME: CPT

## 2020-03-20 PROCEDURE — 80048 BASIC METABOLIC PNL TOTAL CA: CPT

## 2020-03-20 PROCEDURE — 86140 C-REACTIVE PROTEIN: CPT

## 2020-03-20 PROCEDURE — 74176 CT ABD & PELVIS W/O CONTRAST: CPT

## 2020-03-20 PROCEDURE — 85025 COMPLETE CBC W/AUTO DIFF WBC: CPT

## 2020-03-20 PROCEDURE — 84484 ASSAY OF TROPONIN QUANT: CPT

## 2020-03-20 PROCEDURE — 87486 CHLMYD PNEUM DNA AMP PROBE: CPT

## 2020-03-20 PROCEDURE — 87581 M.PNEUMON DNA AMP PROBE: CPT

## 2020-03-20 PROCEDURE — 82728 ASSAY OF FERRITIN: CPT

## 2020-03-20 PROCEDURE — 87635 SARS-COV-2 COVID-19 AMP PRB: CPT

## 2020-03-20 PROCEDURE — 84100 ASSAY OF PHOSPHORUS: CPT

## 2020-03-20 PROCEDURE — 80053 COMPREHEN METABOLIC PANEL: CPT

## 2020-03-20 PROCEDURE — 36415 COLL VENOUS BLD VENIPUNCTURE: CPT

## 2020-03-20 PROCEDURE — 99285 EMERGENCY DEPT VISIT HI MDM: CPT | Mod: 25

## 2020-03-20 PROCEDURE — 83690 ASSAY OF LIPASE: CPT

## 2020-03-20 PROCEDURE — 87798 DETECT AGENT NOS DNA AMP: CPT

## 2020-03-20 PROCEDURE — 83605 ASSAY OF LACTIC ACID: CPT

## 2020-03-20 RX ORDER — SODIUM,POTASSIUM PHOSPHATES 278-250MG
1 POWDER IN PACKET (EA) ORAL ONCE
Refills: 0 | Status: COMPLETED | OUTPATIENT
Start: 2020-03-20 | End: 2020-03-20

## 2020-03-20 RX ADMIN — Medication 50 MILLIGRAM(S): at 05:37

## 2020-03-20 RX ADMIN — PANTOPRAZOLE SODIUM 40 MILLIGRAM(S): 20 TABLET, DELAYED RELEASE ORAL at 05:37

## 2020-03-20 RX ADMIN — Medication 30 MILLILITER(S): at 10:22

## 2020-03-20 RX ADMIN — AMLODIPINE BESYLATE 10 MILLIGRAM(S): 2.5 TABLET ORAL at 10:22

## 2020-03-20 RX ADMIN — Medication 1 PACKET(S): at 10:22

## 2020-03-20 RX ADMIN — CLOPIDOGREL BISULFATE 75 MILLIGRAM(S): 75 TABLET, FILM COATED ORAL at 10:22

## 2020-03-20 RX ADMIN — Medication 81 MILLIGRAM(S): at 10:22

## 2020-03-20 NOTE — DIETITIAN INITIAL EVALUATION ADULT. - OTHER INFO
89y/o female with history of HTN,CAD,GERD,PPM, admitted with GERD/nausea and now COVID+..Admitted with weakness.decreased po and nausea.RD called patient and she reported she was not eating much due to "food is always cold" and having some nauseas. No Diarrhea/Vomiting/pain or skin breakdown. Patient requesting diet change to more liberal but to maintain on low salt "I've got high blood pressure".Patient denied any weight loss.Patient is 128% of IBW.RD will honor food requests.Patient well versed on DASH diet and is compliant based on diet recall.

## 2020-03-20 NOTE — DIETITIAN INITIAL EVALUATION ADULT. - FACTORS AFF FOOD INTAKE
"running to the bathroom due to increased BM's/persistent lack of appetite/persistent nausea/other (specify)

## 2020-03-20 NOTE — DISCHARGE NOTE NURSING/CASE MANAGEMENT/SOCIAL WORK - PATIENT PORTAL LINK FT
You can access the FollowMyHealth Patient Portal offered by [facility] by registering at the following website: [email or web address]. By joining [employer]’s FollowMyHealth portal, you will also be able to view your health information using other applications (apps) compatible with our system.

## 2020-03-20 NOTE — DIETITIAN INITIAL EVALUATION ADULT. - PROBLEM SELECTOR PLAN 1
Generalized weakness liekly 2/2 decreased PO intake in setting of covid vs colitis. Hyponatremia cw decreased PO intake.  - Encourage PO  - f/u PM bmp to monitor Na  - IV fluids prn until tolerating PO, last echo 2/20/2020 with EF 60-65%  - PT eval when covid ruled out or if rules changed

## 2020-03-20 NOTE — DISCHARGE NOTE PROVIDER - NSDCCPCAREPLAN_GEN_ALL_CORE_FT
PRINCIPAL DISCHARGE DIAGNOSIS  Diagnosis: Infection due to 2019 novel coronavirus  Assessment and Plan of Treatment: There was concern that you may have the novel new coronavirus, also known as COVID-19. This test resulted from a nasal and oral (mouth) swab that was done earlier in your admission to [facility]. Your symptoms improved dramatically during your hospital stay. Should your test result be positive, the treatment is supportive care, which means: rest, hydration, and maintaining a good healthy diet. You may take tylenol if you experience any fevers and Robitussin for cough. If you start experiencing extreme shortness of breath, difficulty breathing resulting in the inability to even walk, please visit an urgent care. Please maintain a 2 week (14 day) quarantine in your apartment until a health care agent calls you with the test results. The department of health will followup with you via phone, please do not go to your PCP while in self quarantine. Wear a mask at all times should you have to be outdoors briefly - and avoid crowds, public spaces, and mass transit at all times during this quarantine. Continue to wash your hands frequently. Please see the supplemented written instructions for further use.      SECONDARY DISCHARGE DIAGNOSES  Diagnosis: Gastritis  Assessment and Plan of Treatment: Acid reflux is when the acid that is normally in your stomach backs up into the esophagus, tube that carries food from your mouth to your stomach. Another term for acid reflux is "gastroesophageal reflux disease," or GERD. The symptoms include burning in the chest, known as heartburn, burning in the throat or an acid taste in the throat, stomach or chest pain, trouble swallowing, having a raspy voice or a sore throat or unexplained cough. You can potentially improve your symptoms by losing weight, if you are overweight, raising the head of your bed, avoid foods that may make your symptoms worse (coffee, chocolate, alcohol, peppermint and fatty foods), stop smoking if you smoke, and avoid lying down for 3 hours after a meal. If you require medications for your acid reflux, proton pump inhibitors are the most effective medicines in treating GERD and many are available over the counter without a prescription. You should see a doctor if you have trouble swallowing or your food gets stuck on the way down, have unintentional weight loss, have chest pain, choke when you eat, vomit blood or have bowel movements that are red, black or look like tar.   During this admission we gave you a proton pump inhibitor called Protonix (Omeprazole) and MAALOX. These medications can be purchased from your local drug store over the counter. Please schedule an appointment with your primary care provider to address this discomfort.

## 2020-03-20 NOTE — DISCHARGE NOTE PROVIDER - NSDCMRMEDTOKEN_GEN_ALL_CORE_FT
aspirin 81 mg oral tablet, chewable: 1 tab(s) orally once a day  atorvastatin 80 mg oral tablet: 1 tab(s) orally once a day (at bedtime) MDD:1 tab  clopidogrel 75 mg oral tablet: 1 tab(s) orally once a day MDD:1 tab  diphenhydramine/lidocaine/aluminum hydroxide/magnesium hydroxide/simethicone mucous membrane suspension: 5 milliliter(s) mucous membrane 2 times a day swoosh and spit   lisinopril 40 mg oral tablet: 1 tab(s) orally once a day  meclizine 25 mg oral tablet: 1 tab(s) orally every 6 hours, As needed, Dizziness  metoprolol succinate 50 mg oral tablet, extended release: 1 tab(s) orally once a day  Protonix 40 mg oral delayed release tablet: 1 tab(s) orally once a day   Senna 8.6 mg oral tablet: 2 tab(s) orally once a day (at bedtime)  Tessalon Perles 100 mg oral capsule: 1 cap(s) orally 3 times a day   Vestibular rehabilitation: ICD 10 (R42- Dizziness) aluminum hydroxide-magnesium hydroxide 200 mg-200 mg/5 mL oral suspension: 30 milliliter(s) orally every 6 hours, As needed, Dyspepsia  aspirin 81 mg oral tablet, chewable: 1 tab(s) orally once a day  atorvastatin 80 mg oral tablet: 1 tab(s) orally once a day (at bedtime) MDD:1 tab  clopidogrel 75 mg oral tablet: 1 tab(s) orally once a day MDD:1 tab  diphenhydramine/lidocaine/aluminum hydroxide/magnesium hydroxide/simethicone mucous membrane suspension: 5 milliliter(s) mucous membrane 2 times a day swoosh and spit   lisinopril 40 mg oral tablet: 1 tab(s) orally once a day  metoprolol succinate 50 mg oral tablet, extended release: 1 tab(s) orally once a day  Protonix 40 mg oral delayed release tablet: 1 tab(s) orally once a day   Senna 8.6 mg oral tablet: 2 tab(s) orally once a day (at bedtime)  Tessalon Perles 100 mg oral capsule: 1 cap(s) orally 3 times a day   Vestibular rehabilitation: ICD 10 (R42- Dizziness)

## 2020-03-20 NOTE — PROGRESS NOTE ADULT - PROBLEM SELECTOR PLAN 2
On presentation Na @ 128 with clinical picture c/w decreased PO as likely cause -- now improving s/p 1.5L NS  - Na @ 137 today.   - c/w qd bmps.

## 2020-03-20 NOTE — PROGRESS NOTE ADULT - SUBJECTIVE AND OBJECTIVE BOX
Interval / Overnight: DANNY    Subjective: Ms. Garcia has mild dizziness today, but is in good spirits. Her GERD symptoms are worsened when she eats, but are improved by the MAALOX. She denies fevers, chills, cough, chest pain.     VITAL SIGNS:  Vital Signs Last 24 Hrs  T(C): 36.4 (20 Mar 2020 06:12), Max: 36.6 (19 Mar 2020 21:56)  T(F): 97.6 (20 Mar 2020 06:12), Max: 97.8 (19 Mar 2020 21:56)  HR: 84 (20 Mar 2020 06:12) (70 - 84)  BP: 164/84 (20 Mar 2020 06:12) (134/76 - 164/84)  BP(mean): --  RR: 18 (20 Mar 2020 06:12) (17 - 18)  SpO2: 97% (20 Mar 2020 06:12) (96% - 97%)    PHYSICAL EXAM:    General: in NAD, sitting up on the edge of her bed, appears comfortable   HEENT: normocephalic, atraumatic; PERRL, anicteric sclera; MMM, no pharyngeal exudates   Neck: supple, no thyromegaly, no lymphadenopathy  Cardiovascular: +S1/S2, RRR, no M/G/R  Respiratory: Breathing comfortably, no accessory muscle use; CTABL, no wheezes rales or rhonchi   Extremities: WWP; nonedematous legs   Vascular: pulses palpable 1+ (radial and DP)   Neurological: Alert to person place and time       MEDICATIONS:  MEDICATIONS  (STANDING):  amLODIPine   Tablet 10 milliGRAM(s) Oral every 24 hours  aspirin  chewable 81 milliGRAM(s) Oral every 24 hours  atorvastatin 80 milliGRAM(s) Oral at bedtime  clopidogrel Tablet 75 milliGRAM(s) Oral daily  enoxaparin Injectable 30 milliGRAM(s) SubCutaneous every 24 hours  metoprolol succinate ER 50 milliGRAM(s) Oral daily  pantoprazole    Tablet 40 milliGRAM(s) Oral before breakfast    MEDICATIONS  (PRN):  aluminum hydroxide/magnesium hydroxide/simethicone Suspension 30 milliLiter(s) Oral every 6 hours PRN Dyspepsia  benzonatate 100 milliGRAM(s) Oral three times a day PRN Cough      ALLERGIES:  Allergies    iodinated radiocontrast agents (Anaphylaxis)    Intolerances        LABS:                        11.0   4.69  )-----------( 225      ( 20 Mar 2020 07:57 )             32.8     03-20    137  |  99  |  5<L>  ----------------------------<  108<H>  4.1   |  29  |  0.67    Ca    9.0      20 Mar 2020 07:57  Phos  1.7     03-20  Mg     2.3     03-20    TPro  6.8  /  Alb  3.1<L>  /  TBili  0.3  /  DBili  x   /  AST  27  /  ALT  18  /  AlkPhos  61  03-20        CAPILLARY BLOOD GLUCOSE          RADIOLOGY & ADDITIONAL TESTS: Reviewed.

## 2020-03-20 NOTE — PROGRESS NOTE ADULT - PROBLEM SELECTOR PLAN 1
Patient is currently asymptomatic (normal RR, o2 sat @ 95%) with CT imaging showing peripheral groundglass opacities and discoid atelectasis. Official read as multifocal pneumonitis. COVID positive.  - Encourage PO due to fatigue.   - Patients Na @ 137 (presented @ 128), responding to fluids and current DASH TLC diet.   - disposition now c/b son who is ill and can't watch patient at home. Will need repeat COVID test if going to SNF.

## 2020-03-20 NOTE — DISCHARGE NOTE PROVIDER - HOSPITAL COURSE
Patient is 89F with past medical history of HTN, CAD, GERD, PPM who presented with weakness found to be COVID-19 positive.         #COVID-19    Found to be COVID-19 positive on 3/17.                 Problem List/Main Diagnoses (system-based):     Inpatient treatment course:     New medications:     Labs to be followed outpatient:     Exam to be followed outpatient: Patient is 89F with past medical history of HTN, CAD, GERD, PPM who presented with weakness, nausea, upper abdominal burning, found to be COVID-19 positive.         Problem List        COVID 19    GERD    HTN    Hx of r/o strokes        Brief Hospital course:    Ms. Garcia was admitted to [facility] after presenting to the ER with nausea, weakness and symptoms of GERD. She received an abdominal and chest CT. Chest CT with peripheral ground glass opacities concerning for COVID later confirmed with PCR assay. CT of abdomen demonstrated colitis of transverse colon. She was admitted to covid unit. She was hemodynamically stable and her respiratory status remained stable. She was treated symptomatically for GERD with a PPI and MAALOX. Patient was medically optimized, stable and ready for discharge. Plan of care and return precautions were discussed with the patient who verbally stated understanding.         Plan: Patient given clear instructions regarding isolation and instructed to follow up with her PCP.

## 2020-03-20 NOTE — DIETITIAN INITIAL EVALUATION ADULT. - EST PROTEIN NEEDS5
Problem: Perioperative Period (Adult)  Goal: Signs and Symptoms of Listed Potential Problems Will be Absent or Manageable (Perioperative Period)  Outcome: Ongoing (interventions implemented as appropriate)       72.3

## 2020-03-22 LAB
CULTURE RESULTS: SIGNIFICANT CHANGE UP
CULTURE RESULTS: SIGNIFICANT CHANGE UP
SPECIMEN SOURCE: SIGNIFICANT CHANGE UP
SPECIMEN SOURCE: SIGNIFICANT CHANGE UP

## 2020-03-23 RX ORDER — LISINOPRIL 40 MG/1
40 TABLET ORAL
Refills: 0 | Status: DISCONTINUED | COMMUNITY
End: 2020-03-23

## 2020-03-23 RX ORDER — LISINOPRIL 40 MG/1
40 TABLET ORAL DAILY
Qty: 30 | Refills: 0 | Status: ACTIVE | COMMUNITY
Start: 2020-03-23 | End: 1900-01-01

## 2020-03-25 DIAGNOSIS — R05 COUGH: ICD-10-CM

## 2020-04-05 ENCOUNTER — NON-APPOINTMENT (OUTPATIENT)
Age: 85
End: 2020-04-05

## 2020-05-04 ENCOUNTER — APPOINTMENT (OUTPATIENT)
Dept: HEART AND VASCULAR | Facility: CLINIC | Age: 85
End: 2020-05-04
Payer: MEDICARE

## 2020-05-04 PROCEDURE — 99442: CPT | Mod: CR

## 2020-05-07 ENCOUNTER — APPOINTMENT (OUTPATIENT)
Dept: HEART AND VASCULAR | Facility: CLINIC | Age: 85
End: 2020-05-07
Payer: MEDICARE

## 2020-05-07 VITALS
BODY MASS INDEX: 24.2 KG/M2 | HEIGHT: 63.78 IN | SYSTOLIC BLOOD PRESSURE: 140 MMHG | HEART RATE: 70 BPM | DIASTOLIC BLOOD PRESSURE: 70 MMHG | OXYGEN SATURATION: 98 % | TEMPERATURE: 98.3 F | WEIGHT: 139.99 LBS

## 2020-05-07 DIAGNOSIS — Z98.61 ATHEROSCLEROTIC HEART DISEASE OF NATIVE CORONARY ARTERY W/OUT ANGINA PECTORIS: ICD-10-CM

## 2020-05-07 DIAGNOSIS — E78.5 HYPERLIPIDEMIA, UNSPECIFIED: ICD-10-CM

## 2020-05-07 DIAGNOSIS — I25.10 ATHEROSCLEROTIC HEART DISEASE OF NATIVE CORONARY ARTERY W/OUT ANGINA PECTORIS: ICD-10-CM

## 2020-05-07 DIAGNOSIS — I10 ESSENTIAL (PRIMARY) HYPERTENSION: ICD-10-CM

## 2020-05-07 DIAGNOSIS — R06.02 SHORTNESS OF BREATH: ICD-10-CM

## 2020-05-07 DIAGNOSIS — Z00.00 ENCOUNTER FOR GENERAL ADULT MEDICAL EXAMINATION W/OUT ABNORMAL FINDINGS: ICD-10-CM

## 2020-05-07 PROCEDURE — 93306 TTE W/DOPPLER COMPLETE: CPT

## 2020-05-07 PROCEDURE — 93000 ELECTROCARDIOGRAM COMPLETE: CPT

## 2020-05-07 PROCEDURE — 99214 OFFICE O/P EST MOD 30 MIN: CPT

## 2020-05-07 PROCEDURE — 99215 OFFICE O/P EST HI 40 MIN: CPT

## 2020-05-07 RX ORDER — AMLODIPINE BESYLATE 10 MG/1
10 TABLET ORAL DAILY
Refills: 0 | Status: ACTIVE | COMMUNITY

## 2020-05-07 RX ORDER — METOPROLOL SUCCINATE 50 MG/1
50 TABLET, EXTENDED RELEASE ORAL
Qty: 90 | Refills: 3 | Status: ACTIVE | COMMUNITY

## 2020-05-07 NOTE — PHYSICAL EXAM
[General Appearance - Well Developed] : well developed [Normal Appearance] : normal appearance [Well Groomed] : well groomed [General Appearance - Well Nourished] : well nourished [No Deformities] : no deformities [General Appearance - In No Acute Distress] : no acute distress [Eyelids - No Xanthelasma] : the eyelids demonstrated no xanthelasmas [Normal Oral Mucosa] : normal oral mucosa [Normal Conjunctiva] : the conjunctiva exhibited no abnormalities [No Oral Pallor] : no oral pallor [No Oral Cyanosis] : no oral cyanosis [No Jugular Venous Pelaez A Waves] : no jugular venous pelaez A waves [Normal Jugular Venous V Waves Present] : normal jugular venous V waves present [Normal Jugular Venous A Waves Present] : normal jugular venous A waves present [Exaggerated Use Of Accessory Muscles For Inspiration] : no accessory muscle use [Respiration, Rhythm And Depth] : normal respiratory rhythm and effort [Heart Rate And Rhythm] : heart rate and rhythm were normal [Auscultation Breath Sounds / Voice Sounds] : lungs were clear to auscultation bilaterally [Heart Sounds] : normal S1 and S2 [Murmurs] : no murmurs present [Abdomen Tenderness] : non-tender [Abdomen Soft] : soft [Abdomen Mass (___ Cm)] : no abdominal mass palpated [Abnormal Walk] : normal gait [Gait - Sufficient For Exercise Testing] : the gait was sufficient for exercise testing [Nail Clubbing] : no clubbing of the fingernails [Cyanosis, Localized] : no localized cyanosis [Petechial Hemorrhages (___cm)] : no petechial hemorrhages [] : no rash [No Venous Stasis] : no venous stasis [Skin Color & Pigmentation] : normal skin color and pigmentation [No Skin Ulcers] : no skin ulcer [Skin Lesions] : no skin lesions [Affect] : the affect was normal [No Xanthoma] : no  xanthoma was observed [Oriented To Time, Place, And Person] : oriented to person, place, and time [Mood] : the mood was normal [No Anxiety] : not feeling anxious

## 2020-05-14 NOTE — HISTORY OF PRESENT ILLNESS
[FreeTextEntry1] : 5/7/20 subjective: adjusted home anti htn regimen, taking norvasc at night and only 50mg mtp in am, feeling much better, had echo today with mild lvg and elevated la perusers, structurally normal.  \par SOB - no\par chest pain - no\par location: chest\par duration: none\par modifying factors: none\par timing: none\par severity: 0/10

## 2020-05-14 NOTE — DISCUSSION/SUMMARY
[Stable] : stable [Coronary Artery Disease] : coronary artery disease [Hypertension] : hypertension [Not Responding to Treatment] : not responding to treatment [ECG] : ECG [Ambulatory BP Monitoring] : ambulatory blood pressure monitoring [Rhythm Disorder] : rhythm disorder [Deteriorating] : deteriorating [Electrophysiology Study] : an electrophysiology study [Left Heart Failure] : left heart failure [Outpatient Evaluation] : outpatient evaluation [Improving] : improving [Echocardiogram] : an echocardiogram [Electrocardiogram] : an ECG [Medication Changes Per Orders] : as documented in orders [FreeTextEntry1] : \par The following procedures have been personally reviewed and interpreted:\par \par EKG - reviewed: a-v- paced at 70bpm\par \par Echo -  structurally normal heart, the ejection fraction preserved, mild LVH with mildly elevated LA pressures\par \par

## 2020-05-14 NOTE — REASON FOR VISIT
[Follow-Up - Clinic] : a clinic follow-up of [Coronary Artery Disease] : coronary artery disease [Dyspnea] : dyspnea [Hypertension] : hypertension [FreeTextEntry1] : 89F with past medical history of HTN, CAD, GERD, PPM who presented \par with weakness, nausea, upper abdominal burning, found to be COVID-19 positive. \par \par Problem List \par \par COVID 19 \par GERD \par HTN \par Hx of r/o strokes \par \par Brief Hospital course: \par Ms. Garcia was admitted to [facility] after presenting to the ER with nausea, weakness \par and symptoms of GERD. She received an abdominal and chest CT. Chest CT with \par peripheral ground glass opacities concerning for COVID later confirmed with PCR \par assay. CT of abdomen demonstrated colitis of transverse colon. She was admitted \par to covid unit. She was hemodynamically stable and her respiratory status \par remained stable. She was treated symptomatically for GERD with a PPI and \par MAALOX. Patient was medically optimized, stable and ready for discharge. Plan \par of care and return precautions were discussed with the patient who verbally \par stated understanding.

## 2020-05-19 ENCOUNTER — APPOINTMENT (OUTPATIENT)
Dept: NEUROLOGY | Facility: CLINIC | Age: 85
End: 2020-05-19

## 2020-05-20 ENCOUNTER — APPOINTMENT (OUTPATIENT)
Dept: NEUROLOGY | Facility: CLINIC | Age: 85
End: 2020-05-20
Payer: MEDICARE

## 2020-05-20 DIAGNOSIS — R09.89 OTHER SPECIFIED SYMPTOMS AND SIGNS INVOLVING THE CIRCULATORY AND RESPIRATORY SYSTEMS: ICD-10-CM

## 2020-05-20 DIAGNOSIS — R51 HEADACHE: ICD-10-CM

## 2020-05-20 PROCEDURE — 99212 OFFICE O/P EST SF 10 MIN: CPT | Mod: 95

## 2020-05-20 NOTE — HISTORY OF PRESENT ILLNESS
[Other Location: e.g. Home (Enter Location, City,State)___] : at [unfilled] [Home] : at home, [unfilled] , at the time of the visit. [Verbal consent obtained from patient] : the patient, [unfilled] [FreeTextEntry1] : 89 year old female - mild headaches and sinus symptoms - no tremors, diplopia - occasional dizziness -\par  No new weakness - no cognitive issues \par Low back pain almost resolved \par \par Awake and alert\par  Full EOM \par  No nystagmus\par  No tremors \par  Negative Romberg\par  Limited ROM of the neck \par \par I reviewed MRI head - unremarkable\par \par She will proceed with ENT appt\par \par Holding w/o for her low back and LE

## 2020-05-25 NOTE — ED ADULT NURSE NOTE - NSSISCREENINGQ2_ED_A_ED
IV team msgd re: pt's picc line looks like it is kinked and if they can come examine this picc to see that it is working correctly     [facility] with Cuong Bañuelos from IV team who stated that pt's picc line is ok to use but thinks it may have been pulled.  Per Marcellus both ports of picc now draw blood however she would relay the message to IV team tomorrow because she believes that the patient will need a new line No

## 2020-07-07 NOTE — ED ADULT NURSE NOTE - LATERALITY
[FreeTextEntry1] : AYLEEN would like omeprazole and fenofibrate refilled \par noticed red rash on face for eight months, states it bleeds and it is spreading \par [facility] [place] 
generalized

## 2020-09-08 NOTE — PROGRESS NOTE ADULT - I WAS PHYSICALLY PRESENT FOR THE KEY PORTIONS OF THE EVALUATION AND MANAGEMENT (E/M) SERVICE PROVIDED.  I AGREE WITH THE ABOVE HISTORY, PHYSICAL, AND PLAN WHICH I HAVE REVIEWED AND EDITED WHERE APPROPRIATE
Statement Selected
c/o generalized weakness,dizziness ,SOB/CROUCH/unable to perform

## 2020-10-29 NOTE — ED ADULT NURSE NOTE - NS ED NURSE DISCH DISPOSITION
Pt saw Dr. Rubalcava on 10/27/20.  Mother was present with her.  Mother answered yes to having food insecurity.  Called mother to follow up today.  States she has a part time job but it is currently not meeting all financial needs.  Has not applied for [employer] or other [facility] financial assistance programs.  Mother prefers to fill out paperwork and mail in rather than complete online.  Mailed her a copy of the combined application form, as well as a list of food shelves near her.  She also requested assistance with how to apply for disability.  Printed her a copy of disability starter pack and sent that as well.      Pt's mother also indicated that they did not have the paper prescription for kenalog that was printed on Tuesday.  Updated RNs with pharmacy of choice, script will be sent over.    Encouraged pt's mother to reach out with any other resource needs. I will reach out in a month to see how things are going if I have not heard from her sooner.    ANUPAM MccabeSW    
Admitted

## 2020-12-07 NOTE — ED ADULT NURSE NOTE - CHPI ED NUR DURATION
CATARACTS, OU - VISUALLY SIGNIFICANT. SCHEDULE _OD_ FIRST THEN LATER IN _OS_ DISCUSSED OPTION OF ____STANDARD OU_. PATIENT UNDERSTANDS AND DESIRES ____STANDARD OU____. day(s)/4 days

## 2021-03-25 NOTE — ED PROVIDER NOTE - CPE EDP MUSC NORM
Patient notified medication approved. Instructed she will receive the shipment in the mail. Encouraged to review materials. Call office with any questions or concerns. Offered appointment in [place] to help with first injection if needed.    normal...

## 2021-07-08 NOTE — PATIENT PROFILE ADULT - NSPROPOAURINARYCATHETER_GEN_A_NUR
Medication:   Pending Prescriptions Disp Refills    triamcinolone (KENALOG) 0.1 % ointment   0     Patient Phone Number: [phone] (home)     Last appt: 3/1/2021   Next appt: 9/1/2021    Preferred Pharmacy:   [facility] [address], [address] [facility] [phone] - F [phone] no

## 2021-11-18 NOTE — ED ADULT NURSE NOTE - TEMPLATE LIST FOR HEAD TO TOE ASSESSMENT
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]    Treatment Diagnosis Dysphonia   Date of Evaluation 21      Functional Outcome Measure Used VHI   Functional Outcome Score 59 (21)       Insurance: Primary: Payor: Rachel Vivas /  /  / ,   Secondary:    Authorization Information: precert required after 60 visits   Visit # 2, 2/10 for progress note   Visits Allowed: 2 - 60 visits PT/OT/ST combined per calendar year; precert required after 60 visits   Recertification Date: 84   Physician Follow-Up: N/A   Physician Orders: eval and treat    Pertinent History:  Patient reports she started with hoarseness about 3 months ago and she would have days where she was completely aphonic. She reports this seemed to be the case the day after she would drink alcohol. She stopped drinking alcohol, but would still lose her voice periodically. Patient reports she work long days most of the time and her job is verbally demanding in a noisy environment sometimes. SUBJECTIVE: Patient pleasant and cooperative throughout session. Commented she feels \"silly\" to be doing things that \"children\" do. Continued education provided to patient on how the strategies/activities addressed within each goal will aid in reducing hyperadduction to allow the vocal nodules to heal and promote improved vocal quality. Patient verbalized understanding.      SHORT TERM GOAL #1:  Goal 1: Patient will follow through with vocal hygiene principles with no more than min cues for improved vocal fold health and allow the vocal nodules to Next Treatment: review vocal hygeine principles, easy onset, resonant voice, sustained phonation and musical glide activities. Activity/Treatment Tolerance:  [x]  Patient tolerated treatment well  []  Patient limited by fatigue  []  Patient limited by pain   []  Patient limited by other medical complications  []  Other:     Patient Education:   [x]  HEP/Education Completed: Plan of Care, Goals, HEP to practice /h/ initial words  []  No new Education completed  []  Reviewed Prior HEP      [x]  Patient verbalized and/or demonstrated understanding of education provided. []  Patient unable to verbalize and/or demonstrate understanding of education provided. Will continue education. []  Barriers to learning:     PLAN:  [x]  Plan of care initiated. Plan to see patient 1 times per week for 8 weeks to address the treatment planned outlined above.   []  Continue with current plan of care  []  Modify plan of care as follows:    []  Hold pending physician visit  []  Discharge    Time In 1120   Time Out 1145   Timed Code Minutes: 0 min   Total Treatment Time: 25 min     DIVINA Barrett M.S.-SLP, [id number] Abdominal Pain, N/V/D

## 2022-07-20 NOTE — PROGRESS NOTE ADULT - NSHPATTENDINGPLANDISCUSS_GEN_ALL_CORE
Called pt, no answer; left message informing pt I was calling to remind pt of her in office EAWV on 7/22/22 and to return my call if she had any questions; left my name and number       SHS

## 2022-08-29 NOTE — PHYSICAL THERAPY INITIAL EVALUATION ADULT - ADDITIONAL COMMENTS
Birth of live vigorous male  @ 36 via  by Dr. Newton Ortega. Apgars 9/9. Weight 7 pounds 7 ounces. Patient reports previously independent with all ADLs/IADLs prior to admission. No HHA however inquiring if she could have one (referred to CM/SW team). Denies history of mechanical falls within the last 6 months.

## 2023-08-31 NOTE — ED ADULT TRIAGE NOTE - ADDITIONAL SAFETY/BANDS...
Additional Safety/Bands: DISPLAY PLAN FREE TEXT DISPLAY PLAN FREE TEXT DISPLAY PLAN FREE TEXT DISPLAY PLAN FREE TEXT DISPLAY PLAN FREE TEXT DISPLAY PLAN FREE TEXT DISPLAY PLAN FREE TEXT

## 2023-10-28 NOTE — H&P ADULT - PROBLEM SELECTOR PLAN 1
Patient presenting with a 1 day history of headache, dizziness, tinnitus, and left leg weakness. 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
Patient

## 2023-11-16 NOTE — ED ADULT NURSE NOTE - CHPI ED NUR SYMPTOMS POS
TTE 9/23 with EF of 20-25% and global left ventricular hypokinesis.  - c/w Toprol 25mg daily & spironolactone 25 mg daily  - Valsartan 40mg held due to hypotension   - Monitor I/O's, daily weights  - repeat TTE unchanged EF   - c/w Lasix 40 qd, dosed Bumex 1 mg x 1 again today    - cards signed off NAUSEA/BURNING URINATION/frequent urination

## 2023-12-26 NOTE — ED ADULT NURSE NOTE - CADM POA CENTRAL LINE
This patient was referred to the Direct Access Screening Colonoscopy program (DASC). Per DASC protocol, I reviewed the patient's recent history & physical. This patient meets DASC criteria and the DASC team will contact the patient to schedule their colonoscopy.    
No

## 2023-12-26 NOTE — ED PROVIDER NOTE - TEMPLATE, MLM
General 1 Principal Discharge DX:	Right hip pain  Secondary Diagnosis:	Knee pain  Secondary Diagnosis:	OA (osteoarthritis)

## 2024-08-06 NOTE — ED ADULT TRIAGE NOTE - NS ED NOTE AC HIGH RISK COUNTRIES
Blood cultures came back positive. ED physician was notified. Patient was called and advised to come back to ED for further evaluation/treatment. Patient was agreeable to come back in the morning.     No

## 2024-08-14 NOTE — ED ADULT NURSE NOTE - PAIN: PRESENCE, MLM
Follow up in 3 months  Continue current medications  Call with questions.   complains of pain/discomfort/epigastric

## 2024-09-08 NOTE — ED PROVIDER NOTE - NSFOLLOWUPINSTRUCTIONS_ED_ALL_ED_FT
PLEASE FOLLOW-UP WITH YOUR GASTROENTEROLOGIST AS DISCUSSED.    PLEASE RETURN IF FEVER, CHEST PAIN, COUGH, SHORTNESS OF BREATH, VOMITING, WORSENING/INTOLERABLE PAIN, OTHER CONCERNING SYMPTOMS.              Abdominal Pain, Adult  Abdominal pain can be caused by many things. Often, abdominal pain is not serious and it gets better with no treatment or by being treated at home. However, sometimes abdominal pain is serious. Your health care provider will do a medical history and a physical exam to try to determine the cause of your abdominal pain.  Follow these instructions at home:  Take over-the-counter and prescription medicines only as told by your health care provider. Do not take a laxative unless told by your health care provider.Drink enough fluid to keep your urine clear or pale yellow.Watch your condition for any changes.Keep all follow-up visits as told by your health care provider. This is important.Contact a health care provider if:  Your abdominal pain changes or gets worse.You are not hungry or you lose weight without trying.You are constipated or have diarrhea for more than 2–3 days.You have pain when you urinate or have a bowel movement.Your abdominal pain wakes you up at night.Your pain gets worse with meals, after eating, or with certain foods.You are throwing up and cannot keep anything down.You have a fever.Get help right away if:  Your pain does not go away as soon as your health care provider told you to expect.You cannot stop throwing up.Your pain is only in areas of the abdomen, such as the right side or the left lower portion of the abdomen.You have bloody or black stools, or stools that look like tar.You have severe pain, cramping, or bloating in your abdomen.You have signs of dehydration, such as:  Dark urine, very little urine, or no urine.Cracked lips.Dry mouth.Sunken eyes.Sleepiness.Weakness.This information is not intended to replace advice given to you by your health care provider. Make sure you discuss any questions you have with your health care provider.    Document Released: 09/27/2006 Document Revised: 07/07/2017 Document Reviewed: 05/31/2017  [employer] Interactive Patient Education © 2019 [employer] No

## 2024-09-09 NOTE — ED ADULT NURSE NOTE - CAS EDP DISCH TYPE
[de-identified] : Ms. Fuentes is a very pleasant 76-year-old female with a history of a known left parasellar meningioma diagnosed in 2015 status post stereotactic radiosurgery in 08/2023 by Dr. Lewis who presented to Dr. Flores with several weeks of difficulty with ambulation and magnetic gait, memory loss, confusion and an episode of urinary incontinence. Serial MRIs demonstrated a progressive increase in size of her ventricles since 2019, consistent with normal pressure hydrocephalus.  She had a Codman Certas VPS placement on 1/4/24 set at 4.  At their last visit, she c/o left eye blurriness.   CT 8/2024 reveals a left parietal approach shunt catheter with its tip terminating at the septum pellucidum, similar in position compared to prior. Stable ventricular size. Stable size of 2.7 x 3.3 cm left parasellar mass. There is vasogenic edema in the adjacent left temporal lobe. There is a 7 x 6 mm hyperdense left posterior parasagittal vertex extra-axial lesion suggestive of a meningioma, stable compared to MRI dated 11/27/2023. Home

## 2024-09-18 NOTE — OCCUPATIONAL THERAPY INITIAL EVALUATION ADULT - LEVEL OF INDEPENDENCE: SCOOT/BRIDGE, REHAB EVAL
independent

## 2024-11-15 NOTE — OCCUPATIONAL THERAPY INITIAL EVALUATION ADULT - FINE MOTOR COORDINATION EXAM
pt with multiple oral agents as out pt  tightly controlled with a1c- 6.5 and occ hypoglycemia  now with hyperglycemia  rec lantus 8 units  admelog 2 ac tid  fsg ac and hs  nutrition eval  adjust meds upon d/c  d/w prim team and son at bedside
Left UE/Right UE

## 2025-04-09 NOTE — PATIENT PROFILE ADULT - NSTRANSFERBELONGINGSRESP_GEN_A_NUR
Patient notified.     
Pt requesting NEW RX: METOPROLOL ER  
Received fax from [facility] requesting metoPROLOL succinate (TOPROL-XL) 50 MG 24 hr tablet  to be sent.     Called pt & pts wife & was advised by wife that this script should be sent to [facility] because they pay zero co-pay.    Medication sent to [facility]    Called [facility] to cancel script, pharmacy on lunch break.   Will call later to cancel script for metoPROLOL succinate (TOPROL-XL) 50 MG 24 hr tablet    
yes

## 2025-06-18 NOTE — OCCUPATIONAL THERAPY INITIAL EVALUATION ADULT - MANUAL MUSCLE TESTING RESULTS, REHAB EVAL
no
Bilateral upper extremities >4/5 throughout. CN Testing: b/l frontalis intact; smile symmetrical; tongue protrusion at midline; b/l eyes open/close intact.